# Patient Record
Sex: FEMALE | Race: OTHER | HISPANIC OR LATINO | URBAN - METROPOLITAN AREA
[De-identification: names, ages, dates, MRNs, and addresses within clinical notes are randomized per-mention and may not be internally consistent; named-entity substitution may affect disease eponyms.]

---

## 2021-01-11 ENCOUNTER — INPATIENT (INPATIENT)
Facility: HOSPITAL | Age: 66
LOS: 7 days | Discharge: HOME | DRG: 99 | End: 2021-01-19
Attending: PSYCHIATRY & NEUROLOGY | Admitting: STUDENT IN AN ORGANIZED HEALTH CARE EDUCATION/TRAINING PROGRAM
Payer: MEDICARE

## 2021-01-11 VITALS
DIASTOLIC BLOOD PRESSURE: 59 MMHG | WEIGHT: 138.01 LBS | HEART RATE: 90 BPM | OXYGEN SATURATION: 99 % | SYSTOLIC BLOOD PRESSURE: 99 MMHG | TEMPERATURE: 98 F | HEIGHT: 59 IN | RESPIRATION RATE: 20 BRPM

## 2021-01-11 DIAGNOSIS — M46.90 UNSPECIFIED INFLAMMATORY SPONDYLOPATHY, SITE UNSPECIFIED: ICD-10-CM

## 2021-01-11 LAB
ALBUMIN SERPL ELPH-MCNC: 4.5 G/DL — SIGNIFICANT CHANGE UP (ref 3.3–5)
ALP SERPL-CCNC: 144 U/L — HIGH (ref 40–120)
ALT FLD-CCNC: 19 U/L — SIGNIFICANT CHANGE UP (ref 10–45)
ANION GAP SERPL CALC-SCNC: 15 MMOL/L — SIGNIFICANT CHANGE UP (ref 5–17)
APTT BLD: 36.7 SEC — HIGH (ref 27.5–35.5)
AST SERPL-CCNC: 14 U/L — SIGNIFICANT CHANGE UP (ref 10–40)
BASOPHILS # BLD AUTO: 0.05 K/UL — SIGNIFICANT CHANGE UP (ref 0–0.2)
BASOPHILS NFR BLD AUTO: 0.4 % — SIGNIFICANT CHANGE UP (ref 0–2)
BILIRUB SERPL-MCNC: 0.5 MG/DL — SIGNIFICANT CHANGE UP (ref 0.2–1.2)
BUN SERPL-MCNC: 24 MG/DL — HIGH (ref 7–23)
CALCIUM SERPL-MCNC: 11.3 MG/DL — HIGH (ref 8.4–10.5)
CHLORIDE SERPL-SCNC: 99 MMOL/L — SIGNIFICANT CHANGE UP (ref 96–108)
CO2 SERPL-SCNC: 23 MMOL/L — SIGNIFICANT CHANGE UP (ref 22–31)
CREAT SERPL-MCNC: 0.93 MG/DL — SIGNIFICANT CHANGE UP (ref 0.5–1.3)
EOSINOPHIL # BLD AUTO: 0.17 K/UL — SIGNIFICANT CHANGE UP (ref 0–0.5)
EOSINOPHIL NFR BLD AUTO: 1.2 % — SIGNIFICANT CHANGE UP (ref 0–6)
GLUCOSE SERPL-MCNC: 103 MG/DL — HIGH (ref 70–99)
HCT VFR BLD CALC: 45.4 % — HIGH (ref 34.5–45)
HGB BLD-MCNC: 14.9 G/DL — SIGNIFICANT CHANGE UP (ref 11.5–15.5)
IMM GRANULOCYTES NFR BLD AUTO: 0.4 % — SIGNIFICANT CHANGE UP (ref 0–1.5)
INR BLD: 1.06 RATIO — SIGNIFICANT CHANGE UP (ref 0.88–1.16)
LYMPHOCYTES # BLD AUTO: 27 % — SIGNIFICANT CHANGE UP (ref 13–44)
LYMPHOCYTES # BLD AUTO: 3.76 K/UL — HIGH (ref 1–3.3)
MCHC RBC-ENTMCNC: 30.8 PG — SIGNIFICANT CHANGE UP (ref 27–34)
MCHC RBC-ENTMCNC: 32.8 GM/DL — SIGNIFICANT CHANGE UP (ref 32–36)
MCV RBC AUTO: 94 FL — SIGNIFICANT CHANGE UP (ref 80–100)
MONOCYTES # BLD AUTO: 0.85 K/UL — SIGNIFICANT CHANGE UP (ref 0–0.9)
MONOCYTES NFR BLD AUTO: 6.1 % — SIGNIFICANT CHANGE UP (ref 2–14)
NEUTROPHILS # BLD AUTO: 9.03 K/UL — HIGH (ref 1.8–7.4)
NEUTROPHILS NFR BLD AUTO: 64.9 % — SIGNIFICANT CHANGE UP (ref 43–77)
NRBC # BLD: 0 /100 WBCS — SIGNIFICANT CHANGE UP (ref 0–0)
PLATELET # BLD AUTO: 303 K/UL — SIGNIFICANT CHANGE UP (ref 150–400)
POTASSIUM SERPL-MCNC: 4.4 MMOL/L — SIGNIFICANT CHANGE UP (ref 3.5–5.3)
POTASSIUM SERPL-SCNC: 4.4 MMOL/L — SIGNIFICANT CHANGE UP (ref 3.5–5.3)
PROT SERPL-MCNC: 8.1 G/DL — SIGNIFICANT CHANGE UP (ref 6–8.3)
PROTHROM AB SERPL-ACNC: 12.7 SEC — SIGNIFICANT CHANGE UP (ref 10.6–13.6)
RBC # BLD: 4.83 M/UL — SIGNIFICANT CHANGE UP (ref 3.8–5.2)
RBC # FLD: 13.4 % — SIGNIFICANT CHANGE UP (ref 10.3–14.5)
SODIUM SERPL-SCNC: 137 MMOL/L — SIGNIFICANT CHANGE UP (ref 135–145)
WBC # BLD: 13.92 K/UL — HIGH (ref 3.8–10.5)
WBC # FLD AUTO: 13.92 K/UL — HIGH (ref 3.8–10.5)

## 2021-01-11 PROCEDURE — 72141 MRI NECK SPINE W/O DYE: CPT | Mod: 26

## 2021-01-11 PROCEDURE — 99285 EMERGENCY DEPT VISIT HI MDM: CPT | Mod: GC

## 2021-01-11 PROCEDURE — 72146 MRI CHEST SPINE W/O DYE: CPT | Mod: 26

## 2021-01-11 PROCEDURE — 72148 MRI LUMBAR SPINE W/O DYE: CPT | Mod: 26

## 2021-01-11 PROCEDURE — 72131 CT LUMBAR SPINE W/O DYE: CPT | Mod: 26

## 2021-01-11 RX ORDER — DEXTROSE 50 % IN WATER 50 %
15 SYRINGE (ML) INTRAVENOUS ONCE
Refills: 0 | Status: DISCONTINUED | OUTPATIENT
Start: 2021-01-11 | End: 2021-01-11

## 2021-01-11 RX ORDER — SODIUM CHLORIDE 9 MG/ML
1000 INJECTION, SOLUTION INTRAVENOUS
Refills: 0 | Status: DISCONTINUED | OUTPATIENT
Start: 2021-01-11 | End: 2021-01-11

## 2021-01-11 RX ORDER — INSULIN LISPRO 100/ML
VIAL (ML) SUBCUTANEOUS AT BEDTIME
Refills: 0 | Status: DISCONTINUED | OUTPATIENT
Start: 2021-01-11 | End: 2021-01-11

## 2021-01-11 RX ORDER — DEXTROSE 50 % IN WATER 50 %
25 SYRINGE (ML) INTRAVENOUS ONCE
Refills: 0 | Status: DISCONTINUED | OUTPATIENT
Start: 2021-01-11 | End: 2021-01-11

## 2021-01-11 RX ORDER — GLUCAGON INJECTION, SOLUTION 0.5 MG/.1ML
1 INJECTION, SOLUTION SUBCUTANEOUS ONCE
Refills: 0 | Status: DISCONTINUED | OUTPATIENT
Start: 2021-01-11 | End: 2021-01-11

## 2021-01-11 RX ORDER — INSULIN LISPRO 100/ML
VIAL (ML) SUBCUTANEOUS
Refills: 0 | Status: DISCONTINUED | OUTPATIENT
Start: 2021-01-11 | End: 2021-01-11

## 2021-01-11 RX ORDER — DEXTROSE 50 % IN WATER 50 %
12.5 SYRINGE (ML) INTRAVENOUS ONCE
Refills: 0 | Status: DISCONTINUED | OUTPATIENT
Start: 2021-01-11 | End: 2021-01-11

## 2021-01-11 NOTE — CONSULT NOTE ADULT - SUBJECTIVE AND OBJECTIVE BOX
p ([id number])     HPI:  65 y.o F with PMHx DM, and Depression presents with abd pain radiating to lower back, and difficulty ambulating x2-3 days. States has noted numbness in bilateral lower extremities, right more than the left. Reports urinary incontinence x1 yesterday.  Denies fall, trauma, burning with urination, v/d, fever,  chills, or dizziness. Of note, pt states when she wipes, she does not feel normal and when her clothes touch her groin area, it feels like pins and needles.  (11 Jan 2021 22:24)      Imaging:    Exam: BUE 5/5. RLE HF 4-/5, KE/KF 4/5, DF 4-/5, PF 5/5. LLE 5/5. SILT and proprioception grossly intact b/l, no clonus, no saddle anesthesia, no radic pain.    --Anticoagulation:    =====================  PAST MEDICAL HISTORY   DM (diabetes mellitus)      PAST SURGICAL HISTORY         MEDICATIONS:  Antibiotics:    Neuro:    Other:  dextrose 40% Gel 15 Gram(s) Oral once  dextrose 5%. 1000 milliLiter(s) IV Continuous <Continuous>  dextrose 5%. 1000 milliLiter(s) IV Continuous <Continuous>  dextrose 50% Injectable 25 Gram(s) IV Push once  dextrose 50% Injectable 12.5 Gram(s) IV Push once  dextrose 50% Injectable 25 Gram(s) IV Push once  glucagon  Injectable 1 milliGRAM(s) IntraMuscular once  insulin lispro (ADMELOG) corrective regimen sliding scale   SubCutaneous three times a day before meals  insulin lispro (ADMELOG) corrective regimen sliding scale   SubCutaneous at bedtime      SOCIAL HISTORY:   Occupation:   Marital Status:     FAMILY HISTORY:      ROS: Negative except per HPI    LABS:  PT/INR - ( 11 Jan 2021 18:55 )   PT: 12.7 sec;   INR: 1.06 ratio         PTT - ( 11 Jan 2021 18:55 )  PTT:36.7 sec                        14.9   13.92 )-----------( 303      ( 11 Jan 2021 18:55 )             45.4     01-11    137  |  99  |  24<H>  ----------------------------<  103<H>  4.4   |  23  |  0.93    Ca    11.3<H>      11 Jan 2021 18:55    TPro  8.1  /  Alb  4.5  /  TBili  0.5  /  DBili  x   /  AST  14  /  ALT  19  /  AlkPhos  144<H>  01-11

## 2021-01-11 NOTE — ED PROVIDER NOTE - OBJECTIVE STATEMENT
64 yo female with pmhx DM, depression, presenting with lower back pain and difficulty ambulating for 3 days. States that she has felt her right LE been "weaker", and has had trouble walking, unable to walk. also has had urinary incontinence.     Denies urinary retention, fecal incontinence, falls, trauma.

## 2021-01-11 NOTE — H&P ADULT - ADDITIONAL PE
T(F): 97.7 (11 Jan 2021 18:19), Max: 97.7 (11 Jan 2021 18:19)  HR: 82 (11 Jan 2021 19:06) (82 - 90)  BP: 105/79 (11 Jan 2021 19:06) (99/59 - 105/79)  RR: 21 (11 Jan 2021 19:06) (20 - 21)  SpO2: 99% (11 Jan 2021 19:06) (99% - 99%)

## 2021-01-11 NOTE — ED PROVIDER NOTE - CARE PLAN
Principal Discharge DX:	Spondylitis  Secondary Diagnosis:	Weakness   Principal Discharge DX:	Transverse myelitis  Secondary Diagnosis:	Weakness

## 2021-01-11 NOTE — ED ADULT NURSE NOTE - OBJECTIVE STATEMENT
65y F p/w back pain, LE weakness, difficulty ambulating and incontinence x 1 episode. pt also endorsing that she feels pins and needles in her groin area and endorses "not feeling normal when she wipes." 65y F hx DM2, depression, and arthritis p/w lower back pain, LE weakness, difficulty ambulating x 2-3days and incontinence x 2 episode. pt also endorsing that she feels pins and needles in her groin area and endorses "not feeling normal when she wipes." pt is ambulatory at baseline- pt able to move legs but states they are weaker than her baseline. pt able to bend left knee but has difficulty bending or lifting her right leg and endorses some numbness and tingling.

## 2021-01-11 NOTE — CONSULT NOTE ADULT - ATTENDING COMMENTS
Pt seen today 1/13/2020.  Agree with above resident evaluation and assessment.  Pt currently has about 2/5 movement right leg and 3/5 left.  Good touch sensation.  MRI with T2 signal change in cord at T10.  She also has L5S1 spondylolisthesis and severe stenosis.  Given exam, thoracic transverse myelopathy is suspected.  Await CSF from LP.  Stenosis not likely a contributor to this exam at this time.  Can be followed up as an outpatient in office.  Plan per primary team.

## 2021-01-11 NOTE — ED PROVIDER NOTE - CLINICAL SUMMARY MEDICAL DECISION MAKING FREE TEXT BOX
66 yo female with pmhx DM, depression, presenting with lower back pain and difficulty ambulating for 3 days. concern for nerve compression. will evaluate initially with ct spine, cbc, cmp, urine studies. will reassess

## 2021-01-11 NOTE — ED PROVIDER NOTE - PROGRESS NOTE DETAILS
Geni Elizalde PGY3  ct shows spondylitis in L5-S1 with multilevel degenerative discs. nsgy evaluated, rec MR T-L spine (non-emergent) to eval further, medicine admission for PT and MRI Geni Elizalde PGY3  upon further discussion, nsgy now recommending stat mri to r/o cord compression, paged hospitalist to update. will reassess. nsgy speaking to mri Geni Elizalde PGY3  nsgy reviewed mri, did not see compression, but possible owl eye sign on t12, concerning for infarct vs inflammation, neuro consulted, will admit for evaluation for transverse myelitis

## 2021-01-11 NOTE — ED ADULT NURSE REASSESSMENT NOTE - NS ED NURSE REASSESS COMMENT FT1
per neurosurg pt needs emergent MRI prior to admission. Admission dispo cancelled at this time awaiting MRI, pt aware of plan.

## 2021-01-11 NOTE — ED PROVIDER NOTE - RAPID ASSESSMENT
65 y.o F with PMHx DM, and Depression presents with abd pain radiating to lower back, and difficulty ambulating x2-3 days. States has noted numbness in bilateral lower extremities, right more than the left. Reports urinary incontinence x1 yesterday.  Denies fall, trauma, burning with urination, v/d, fever,  chills, or dizziness.     Scribe Statement: Andrew WILLETT Tiffany, attest that this documentation has been prepared under the direction and in the presence of Sherrell Diaz (PA) 65 y.o F with PMHx DM, and Depression presents with abd pain radiating to lower back, and difficulty ambulating x2-3 days. States has noted numbness in bilateral lower extremities, right more than the left. Reports urinary incontinence x1 yesterday.  Denies fall, trauma, burning with urination, v/d, fever,  chills, or dizziness. Of note, pt states when she wipes, she does not feel normal and when her clothes touch her groin area, it feels like pins and needles.     Scribe Statement: Andrew WILLETT Tiffany, attest that this documentation has been prepared under the direction and in the presence of Sherrell Diaz) 65 y.o F with PMHx DM, and Depression presents with abd pain radiating to lower back, and difficulty ambulating x2-3 days. States has noted numbness in bilateral lower extremities, right more than the left. Reports urinary incontinence x1 yesterday.  Denies fall, trauma, burning with urination, v/d, fever,  chills, or dizziness. Of note, pt states when she wipes, she does not feel normal and when her clothes touch her groin area, it feels like pins and needles.     escalated to mobile charge at time of evaluation to have patient brought to back for more thorough evaluation of neurologic complaints.    Scribe Statement: I, Yesenia Morales, attest that this documentation has been prepared under the direction and in the presence of Sherrell Diaz (PA)    Sherrell Diaz PA-C: The scribe's documentation has been prepared under my direction and personally reviewed by me in its entirety. I confirm that the note above accurately reflects history obtained.   Patient was rapidly assessed via telemedicine encounter; a limited history was obtained. The patient will be seen and further examined and worked up in the main ED and their care will be completed by the main ED team. Receiving team will follow up on labs, analgesia, any clinical imaging, and perform reassessment and disposition of the patient as clinically indicated. All decisions regarding the progression of care will be made at their discretion.

## 2021-01-11 NOTE — ED PROVIDER NOTE - ATTENDING CONTRIBUTION TO CARE
65 year old female with history of DM, depression presented to ED back pain and bilateral LE weakness(R>L). No recent fall or trauma. Also reported urinary incontinence. On exam: 2/5 strength on RLE 3/5 strengh on LLE. No sensation intact. Deanna rectal tone. Plan: CT lumbar spine to evaluate for occult fractures. Neurosurgery consult. Frequent neurocheck. Admit

## 2021-01-11 NOTE — ED ADULT NURSE NOTE - NSIMPLEMENTINTERV_GEN_ALL_ED
Implemented All Fall with Harm Risk Interventions:  [place] to call system. Call bell, personal items and telephone within reach. Instruct patient to call for assistance. Room bathroom lighting operational. Non-slip footwear when patient is off stretcher. Physically safe environment: no spills, clutter or unnecessary equipment. Stretcher in lowest position, wheels locked, appropriate side rails in place. Provide visual cue, wrist band, yellow gown, etc. Monitor gait and stability. Monitor for mental status changes and reorient to person, place, and time. Review medications for side effects contributing to fall risk. Reinforce activity limits and safety measures with patient and family. Provide visual clues: red socks.

## 2021-01-11 NOTE — CONSULT NOTE ADULT - ASSESSMENT
Chacha Staton  65F chronic LBP, present to ED w/ several days intermittent prox BLE numbness and RLE weakness, pain, difficulty walking. Episode of urinary incontinence yesterday, unable to get to bathroom in time. CT shows exaggerated lumbar lordosis w/ gr1 anterolisthesis of L5 on S1 w/ vac disc, b/l foraminal stenosis at 5/1. Exam: BUE 5/5. RLE HF 4-/5, KE/KF 4/5, DF 4-/5, PF 5/5. LLE 5/5. SILT and proprioception grossly intact b/l, no clonus, no saddle anesthesia, no radic pain.  - MR T/L STAT r/o cord compression  - CT head

## 2021-01-11 NOTE — CHART NOTE - NSCHARTNOTEFT_GEN_A_CORE
- MR imaging demonstrates owl eye sign at T10 concerning for ischemia vs inflammatory pathology, rec neurology and/or rheumatology w/u  - Grossly no evidence of cord compression  - No acute neurosurgical intervention  - Rec spinal angio per neurology

## 2021-01-12 ENCOUNTER — TRANSCRIPTION ENCOUNTER (OUTPATIENT)
Age: 66
End: 2021-01-12

## 2021-01-12 DIAGNOSIS — Z95.820 PERIPHERAL VASCULAR ANGIOPLASTY STATUS WITH IMPLANTS AND GRAFTS: Chronic | ICD-10-CM

## 2021-01-12 LAB
A1C WITH ESTIMATED AVERAGE GLUCOSE RESULT: 8.9 % — HIGH (ref 4–5.6)
ALBUMIN SERPL ELPH-MCNC: 3.7 G/DL — SIGNIFICANT CHANGE UP (ref 3.3–5)
ALP SERPL-CCNC: 128 U/L — HIGH (ref 40–120)
ALT FLD-CCNC: 16 U/L — SIGNIFICANT CHANGE UP (ref 10–45)
ANION GAP SERPL CALC-SCNC: 12 MMOL/L — SIGNIFICANT CHANGE UP (ref 5–17)
AST SERPL-CCNC: 13 U/L — SIGNIFICANT CHANGE UP (ref 10–40)
BASOPHILS # BLD AUTO: 0.05 K/UL — SIGNIFICANT CHANGE UP (ref 0–0.2)
BASOPHILS NFR BLD AUTO: 0.5 % — SIGNIFICANT CHANGE UP (ref 0–2)
BILIRUB SERPL-MCNC: 0.6 MG/DL — SIGNIFICANT CHANGE UP (ref 0.2–1.2)
BLD GP AB SCN SERPL QL: NEGATIVE — SIGNIFICANT CHANGE UP
BUN SERPL-MCNC: 26 MG/DL — HIGH (ref 7–23)
CALCIUM SERPL-MCNC: 9.5 MG/DL — SIGNIFICANT CHANGE UP (ref 8.4–10.5)
CHLORIDE SERPL-SCNC: 98 MMOL/L — SIGNIFICANT CHANGE UP (ref 96–108)
CO2 SERPL-SCNC: 26 MMOL/L — SIGNIFICANT CHANGE UP (ref 22–31)
CREAT SERPL-MCNC: 0.97 MG/DL — SIGNIFICANT CHANGE UP (ref 0.5–1.3)
DSDNA AB FLD-ACNC: <0.2 AI — SIGNIFICANT CHANGE UP
ENA SS-A AB FLD IA-ACNC: <0.2 AI — SIGNIFICANT CHANGE UP
EOSINOPHIL # BLD AUTO: 0.27 K/UL — SIGNIFICANT CHANGE UP (ref 0–0.5)
EOSINOPHIL NFR BLD AUTO: 2.9 % — SIGNIFICANT CHANGE UP (ref 0–6)
ESTIMATED AVERAGE GLUCOSE: 209 MG/DL — HIGH (ref 68–114)
FOLATE SERPL-MCNC: >20 NG/ML — SIGNIFICANT CHANGE UP
GLUCOSE BLDC GLUCOMTR-MCNC: 135 MG/DL — HIGH (ref 70–99)
GLUCOSE BLDC GLUCOMTR-MCNC: 181 MG/DL — HIGH (ref 70–99)
GLUCOSE BLDC GLUCOMTR-MCNC: 230 MG/DL — HIGH (ref 70–99)
GLUCOSE BLDC GLUCOMTR-MCNC: 258 MG/DL — HIGH (ref 70–99)
GLUCOSE SERPL-MCNC: 237 MG/DL — HIGH (ref 70–99)
HCT VFR BLD CALC: 41 % — SIGNIFICANT CHANGE UP (ref 34.5–45)
HGB BLD-MCNC: 13.6 G/DL — SIGNIFICANT CHANGE UP (ref 11.5–15.5)
IMM GRANULOCYTES NFR BLD AUTO: 0.2 % — SIGNIFICANT CHANGE UP (ref 0–1.5)
LYMPHOCYTES # BLD AUTO: 2.5 K/UL — SIGNIFICANT CHANGE UP (ref 1–3.3)
LYMPHOCYTES # BLD AUTO: 27.2 % — SIGNIFICANT CHANGE UP (ref 13–44)
MCHC RBC-ENTMCNC: 30.6 PG — SIGNIFICANT CHANGE UP (ref 27–34)
MCHC RBC-ENTMCNC: 33.2 GM/DL — SIGNIFICANT CHANGE UP (ref 32–36)
MCV RBC AUTO: 92.3 FL — SIGNIFICANT CHANGE UP (ref 80–100)
MONOCYTES # BLD AUTO: 0.58 K/UL — SIGNIFICANT CHANGE UP (ref 0–0.9)
MONOCYTES NFR BLD AUTO: 6.3 % — SIGNIFICANT CHANGE UP (ref 2–14)
NEUTROPHILS # BLD AUTO: 5.77 K/UL — SIGNIFICANT CHANGE UP (ref 1.8–7.4)
NEUTROPHILS NFR BLD AUTO: 62.9 % — SIGNIFICANT CHANGE UP (ref 43–77)
NRBC # BLD: 0 /100 WBCS — SIGNIFICANT CHANGE UP (ref 0–0)
PLATELET # BLD AUTO: 262 K/UL — SIGNIFICANT CHANGE UP (ref 150–400)
POTASSIUM SERPL-MCNC: 4.1 MMOL/L — SIGNIFICANT CHANGE UP (ref 3.5–5.3)
POTASSIUM SERPL-SCNC: 4.1 MMOL/L — SIGNIFICANT CHANGE UP (ref 3.5–5.3)
PROT SERPL-MCNC: 7 G/DL — SIGNIFICANT CHANGE UP (ref 6–8.3)
RBC # BLD: 4.44 M/UL — SIGNIFICANT CHANGE UP (ref 3.8–5.2)
RBC # FLD: 13.6 % — SIGNIFICANT CHANGE UP (ref 10.3–14.5)
RH IG SCN BLD-IMP: POSITIVE — SIGNIFICANT CHANGE UP
RHEUMATOID FACT SERPL-ACNC: <10 IU/ML — SIGNIFICANT CHANGE UP (ref 0–13)
SARS-COV-2 RNA SPEC QL NAA+PROBE: SIGNIFICANT CHANGE UP
SODIUM SERPL-SCNC: 136 MMOL/L — SIGNIFICANT CHANGE UP (ref 135–145)
T3 SERPL-MCNC: 110 NG/DL — SIGNIFICANT CHANGE UP (ref 80–200)
T4 AB SER-ACNC: 9.4 UG/DL — SIGNIFICANT CHANGE UP (ref 4.6–12)
TSH SERPL-MCNC: 1.67 UIU/ML — SIGNIFICANT CHANGE UP (ref 0.27–4.2)
VIT B12 SERPL-MCNC: 622 PG/ML — SIGNIFICANT CHANGE UP (ref 232–1245)
WBC # BLD: 9.19 K/UL — SIGNIFICANT CHANGE UP (ref 3.8–10.5)
WBC # FLD AUTO: 9.19 K/UL — SIGNIFICANT CHANGE UP (ref 3.8–10.5)

## 2021-01-12 PROCEDURE — 70450 CT HEAD/BRAIN W/O DYE: CPT | Mod: 26

## 2021-01-12 PROCEDURE — 72157 MRI CHEST SPINE W/O & W/DYE: CPT | Mod: 26

## 2021-01-12 PROCEDURE — 99223 1ST HOSP IP/OBS HIGH 75: CPT | Mod: GC,25

## 2021-01-12 PROCEDURE — 72158 MRI LUMBAR SPINE W/O & W/DYE: CPT | Mod: 26

## 2021-01-12 PROCEDURE — 62270 DX LMBR SPI PNXR: CPT | Mod: GC

## 2021-01-12 RX ORDER — AMLODIPINE BESYLATE 2.5 MG/1
5 TABLET ORAL DAILY
Refills: 0 | Status: DISCONTINUED | OUTPATIENT
Start: 2021-01-12 | End: 2021-01-19

## 2021-01-12 RX ORDER — SODIUM CHLORIDE 9 MG/ML
1000 INJECTION, SOLUTION INTRAVENOUS
Refills: 0 | Status: DISCONTINUED | OUTPATIENT
Start: 2021-01-12 | End: 2021-01-19

## 2021-01-12 RX ORDER — GABAPENTIN 400 MG/1
100 CAPSULE ORAL THREE TIMES A DAY
Refills: 0 | Status: DISCONTINUED | OUTPATIENT
Start: 2021-01-12 | End: 2021-01-19

## 2021-01-12 RX ORDER — ATORVASTATIN CALCIUM 80 MG/1
80 TABLET, FILM COATED ORAL AT BEDTIME
Refills: 0 | Status: DISCONTINUED | OUTPATIENT
Start: 2021-01-12 | End: 2021-01-19

## 2021-01-12 RX ORDER — DEXTROSE 50 % IN WATER 50 %
25 SYRINGE (ML) INTRAVENOUS ONCE
Refills: 0 | Status: DISCONTINUED | OUTPATIENT
Start: 2021-01-12 | End: 2021-01-19

## 2021-01-12 RX ORDER — DEXTROSE 50 % IN WATER 50 %
15 SYRINGE (ML) INTRAVENOUS ONCE
Refills: 0 | Status: DISCONTINUED | OUTPATIENT
Start: 2021-01-12 | End: 2021-01-19

## 2021-01-12 RX ORDER — ENOXAPARIN SODIUM 100 MG/ML
40 INJECTION SUBCUTANEOUS DAILY
Refills: 0 | Status: DISCONTINUED | OUTPATIENT
Start: 2021-01-12 | End: 2021-01-12

## 2021-01-12 RX ORDER — SODIUM CHLORIDE 9 MG/ML
1000 INJECTION INTRAMUSCULAR; INTRAVENOUS; SUBCUTANEOUS ONCE
Refills: 0 | Status: COMPLETED | OUTPATIENT
Start: 2021-01-12 | End: 2021-01-12

## 2021-01-12 RX ORDER — DEXTROSE 50 % IN WATER 50 %
12.5 SYRINGE (ML) INTRAVENOUS ONCE
Refills: 0 | Status: DISCONTINUED | OUTPATIENT
Start: 2021-01-12 | End: 2021-01-19

## 2021-01-12 RX ORDER — DONEPEZIL HYDROCHLORIDE 10 MG/1
10 TABLET, FILM COATED ORAL AT BEDTIME
Refills: 0 | Status: DISCONTINUED | OUTPATIENT
Start: 2021-01-12 | End: 2021-01-19

## 2021-01-12 RX ORDER — GLUCAGON INJECTION, SOLUTION 0.5 MG/.1ML
1 INJECTION, SOLUTION SUBCUTANEOUS ONCE
Refills: 0 | Status: DISCONTINUED | OUTPATIENT
Start: 2021-01-12 | End: 2021-01-19

## 2021-01-12 RX ORDER — INSULIN LISPRO 100/ML
VIAL (ML) SUBCUTANEOUS
Refills: 0 | Status: DISCONTINUED | OUTPATIENT
Start: 2021-01-12 | End: 2021-01-17

## 2021-01-12 RX ORDER — INSULIN LISPRO 100/ML
VIAL (ML) SUBCUTANEOUS AT BEDTIME
Refills: 0 | Status: DISCONTINUED | OUTPATIENT
Start: 2021-01-12 | End: 2021-01-17

## 2021-01-12 RX ADMIN — Medication 2: at 17:36

## 2021-01-12 RX ADMIN — DONEPEZIL HYDROCHLORIDE 10 MILLIGRAM(S): 10 TABLET, FILM COATED ORAL at 21:15

## 2021-01-12 RX ADMIN — ATORVASTATIN CALCIUM 80 MILLIGRAM(S): 80 TABLET, FILM COATED ORAL at 21:15

## 2021-01-12 RX ADMIN — GABAPENTIN 100 MILLIGRAM(S): 400 CAPSULE ORAL at 21:15

## 2021-01-12 RX ADMIN — GABAPENTIN 100 MILLIGRAM(S): 400 CAPSULE ORAL at 04:31

## 2021-01-12 RX ADMIN — SODIUM CHLORIDE 1000 MILLILITER(S): 9 INJECTION INTRAMUSCULAR; INTRAVENOUS; SUBCUTANEOUS at 13:30

## 2021-01-12 RX ADMIN — AMLODIPINE BESYLATE 5 MILLIGRAM(S): 2.5 TABLET ORAL at 11:20

## 2021-01-12 RX ADMIN — GABAPENTIN 100 MILLIGRAM(S): 400 CAPSULE ORAL at 13:32

## 2021-01-12 RX ADMIN — Medication 1: at 21:17

## 2021-01-12 NOTE — PROGRESS NOTE ADULT - ASSESSMENT
65F chronic LBP, present to ED w/ several days intermittent prox BLE numbness and RLE weakness, pain, difficulty walking. Episode of urinary incontinence yesterday, unable to get to bathroom in time. CT shows exaggerated lumbar lordosis w/ gr1 anterolisthesis of L5 on S1 w/ vac disc, b/l foraminal stenosis at 5/1. Exam: BUE 5/5. RLE HF 4-/5, KE/KF 4/5, DF 4-/5, PF 5/5. LLE 5/5. SILT and proprioception grossly intact b/l, no clonus, no saddle anesthesia, no radic pain.   ADM neurology q4h neuro checks  - MR T; demonstrates owl's eye sign/T2 signal change at T10 c/f anterior cord infarct vs inflammatory pathology, pending FR, no cord compression  - MRI wwo contrast and DWI seq pending per neurology  - Please send complete preop labs in anticipation of possible angio pending neurology eval per Dr. Workman  - Keep NPO possible spinal angio pending MRI  - needs T&Sx2, MRSA/MSSA swab (ordered)  - PT/OT  - COVID neg 1/11

## 2021-01-12 NOTE — H&P ADULT - ASSESSMENT
Temi Staton is a 65 year old RH Khmer speaking female with a past medical history of HTN, DM, HLD, peripheral artery stents, memory loss who presents with inability to ambulate and lower extremity weakness.    Impression: Lower extremity weakness RLE>LLE with inability to ambulate and MR concerning for an inflammatory vs demyelinating vs ischemic lesion. Concern for transverse myelitis. Potential for spinal cord infarct.     Plan:  LE weakness and inability to ambulate  [] admit to 4 [facility]  [] MR thoracic and lumbar spine w/ and w/o contrast to examine lesion further with DWI sequence  [] likely will require LP for evaluation of thoracic lesion  [] will hold DVT PPx, aspirin, Plavix until after LP  [] B12, folate, TSH, RENNY, ANCA, Sjogrens Ab, RF, ACE  [] unclear reason for elevated WBC, will repeat and obtain Bcx to rule out infection  [] if infectious etiology ruled out likely will start Solumedrol 1g with Protonix Ppx  [] PT/OT    DM  [] BGM and ISS    HTN  [] continue home amlodipine    PAD  [] will hold aspirin and Plavix until after LP    HLD  [] continue home Lipitor    Case to be discussed and patient seen in AM with neurology attending, Dr. Lucero.  Temi Staton is a 65 year old RH Divehi speaking female with a past medical history of HTN, DM, HLD, peripheral artery stents, memory loss who presents with inability to ambulate and lower extremity weakness.    Impression: Lower extremity weakness RLE>LLE with inability to ambulate and MR concerning for an inflammatory vs demyelinating vs ischemic lesion. Concern for transverse myelitis. Potential for spinal cord infarct.     Plan:  LE weakness and inability to ambulate  [] admit to 4 [facility]  [] MR thoracic and lumbar spine w/ and w/o contrast to examine lesion further with DWI sequence  [] likely will require LP for evaluation of thoracic lesion  [] will hold DVT PPx, aspirin, Plavix until after LP  [] B12, Vit E, copper, folate, TSH, RENNY, ANCA, Sjogrens Ab, RF, ACE  [] unclear reason for elevated WBC, will repeat and obtain Bcx to rule out infection  [] if infectious etiology ruled out likely will start Solumedrol 1g with Protonix Ppx  [] PT/OT    DM  [] BGM and ISS    HTN  [] continue home amlodipine    PAD  [] will hold aspirin and Plavix until after LP    HLD  [] continue home Lipitor    Case to be discussed and patient seen in AM with neurology attending, Dr. Lucero.  Temi Staton is a 65 year old RH Mohawk speaking female with a past medical history of HTN, DM, HLD, peripheral artery stents, memory loss who presents with inability to ambulate and lower extremity weakness.    Impression: Lower extremity weakness RLE>LLE with inability to ambulate and MR concerning for an inflammatory vs demyelinating vs ischemic lesion. Concern for transverse myelitis. Potential for spinal cord infarct.     Plan:  LE weakness and inability to ambulate  [] admit to 4 [facility]  [] MR thoracic and lumbar spine w/ and w/o contrast to examine lesion further with DWI sequence  [] likely will require LP for evaluation of thoracic lesion  [] will hold DVT PPx, aspirin, Plavix until after LP  [] B12, Vit E, copper, folate, TSH, RENNY, ANCA, Sjogrens Ab, RF, ACE  [] unclear reason for elevated WBC, will repeat and obtain Bcx to rule out infection  [] if infectious etiology ruled out likely will start Solumedrol 1g with Protonix Ppx  [] PT/OT  [] neurosurgery recs appreciated    DM  [] BGM and ISS    HTN  [] continue home amlodipine    PAD  [] will hold aspirin and Plavix until after LP    HLD  [] continue home Lipitor    Case to be discussed and patient seen in AM with neurology attending, Dr. Lucero.

## 2021-01-12 NOTE — PHYSICAL THERAPY INITIAL EVALUATION ADULT - ADDITIONAL COMMENTS
Pt lives in an apartment w/ spouse, has steps to enter but also ramp & elevator access. PTA pt reports being independent w/ ADL's & functional mobility & did not utilize an AD for ambulation

## 2021-01-12 NOTE — DISCHARGE NOTE PROVIDER - NSDCMRMEDTOKEN_GEN_ALL_CORE_FT
amLODIPine 5 mg oral tablet: 1 tab(s) orally once a day  aspirin 81 mg oral tablet, chewable: 1 tab(s) orally once a day  donepezil 10 mg oral tablet: 1 tab(s) orally once a day (at bedtime)  gabapentin 100 mg oral capsule: 1 cap(s) orally 3 times a day  glipiZIDE 10 mg oral tablet: 1 tab(s) orally once a day  Lipitor 80 mg oral tablet: 1 tab(s) orally once a day  metFORMIN 1000 mg oral tablet: 1 tab(s) orally 2 times a day  Plavix 75 mg oral tablet: 1 tab(s) orally once a day  Vascepa 1 g oral capsule: 2 cap(s) orally 2 times a day   amLODIPine 5 mg oral tablet: 1 tab(s) orally once a day  aspirin 81 mg oral tablet, chewable: 1 tab(s) orally once a day  donepezil 10 mg oral tablet: 1 tab(s) orally once a day (at bedtime)  gabapentin 100 mg oral capsule: 1 cap(s) orally 3 times a day  glipiZIDE 10 mg oral tablet: 1 tab(s) orally once a day  Lipitor 80 mg oral tablet: 1 tab(s) orally once a day  metFORMIN 1000 mg oral tablet: 1 tab(s) orally 2 times a day  Plavix 75 mg oral tablet: 1 tab(s) orally once a day  senna oral tablet: 2 tab(s) orally once a day (at bedtime)  Vascepa 1 g oral capsule: 2 cap(s) orally 2 times a day

## 2021-01-12 NOTE — OCCUPATIONAL THERAPY INITIAL EVALUATION ADULT - PRECAUTIONS/LIMITATIONS, REHAB EVAL
MR Acute SC Compression (1/11): There is no evidence of spinal cord compression identified.There is evidence of abnormal T2 prolongation seen involving the central spinal cord. This extends from from the bottom of T9 to the top of T11. This could be compatible with a transverse myelitis though the possibility of an infectious process or spinal cord infarct cannot be entirely excluded. CT L Spine (1/11): Spondylitic changes most prominent at the L5-S1 level with spondylolisthesis, spinal stenosis and foraminal narrowing. CTH (1/12) Negative for any acute findings MR Acute SC Compression (1/11): There is no evidence of spinal cord compression identified.There is evidence of abnormal T2 prolongation seen involving the central spinal cord. This extends from from the bottom of T9 to the top of T11. This could be compatible with a transverse myelitis though the possibility of an infectious process or spinal cord infarct cannot be entirely excluded. CT L Spine (1/11): Spondylitic changes most prominent at the L5-S1 level with spondylolisthesis, spinal stenosis and foraminal narrowing. CTH (1/12) Negative for any acute findings/no known precautions/limitations

## 2021-01-12 NOTE — H&P ADULT - NSHPREVIEWOFSYSTEMS_GEN_ALL_CORE
REVIEW OF SYSTEMS	    A 10-system ROS was performed and is negative except for those items noted above and/or in the HPI.

## 2021-01-12 NOTE — DISCHARGE NOTE PROVIDER - CARE PROVIDERS DIRECT ADDRESSES
,DirectAddress_Unknown ,DirectAddress_Unknown,[email or web address]

## 2021-01-12 NOTE — DISCHARGE NOTE PROVIDER - HOSPITAL COURSE
Temi Staton is a 65 year old RH Romanian speaking female with a past medical history of HTN, DM, HLD, peripheral artery stents, memory loss who presents with inability to ambulate and lower extremity weakness. At baseline, the patient states that she is able to ambulate without any assistive devices but does so slowly. Approximately 1 week prior she began to feel a pain in her lower abdomen that wrapped around her stomach and radiated to her back bilaterally at approximately the belly button level. She stated that the pain was uncomfortable but was not associated with any weakness and she was still able to perform her daily activities. Did not have any nausea/vomiting at that time. Pain was present for several days and then 3 days ago she noted that she began to have lower extremity weakness which was worse in her RLE. Over that time, she noted that it was difficult for her to get up and ambulate and she required assistance from her family in order to walk. She then presented to the ED for further evaluation. Patient notes that she feels that she has reduced sensation in her lower extremities. Denies head trauma. Denies any symptoms in her upper extremities, or face. Denies headaches, dizziness, lightheadedness, dysphagia, dysarthria, difficulty expressing herself. Denies any recent illnesses, sick contacts, animal exposure or bites. States that she received a flu vaccine in November and has not had any vaccinations since then. No exposure to COVID-19.     Hospital Course (1/11 - ): Patient received CT head (microvascular disease volume loss) and MR lumbosacral spine concerning for abnormal signal intensity at T9-T11, prompting further spinal imaging and lumbar puncture to rule out other etiologies for abnormal signal intensity. Patient arrived to the floor in stable condition. MR cervical and thoracic spine revealed  ______. CSF studies were _______. NMO and MOG serum studies were sent.           Patient neurologically stable for discharge. Temi Staton is a 65 year old RH Greek speaking female with a past medical history of HTN, DM, HLD, peripheral artery stents, memory loss who presents with inability to ambulate and lower extremity weakness. At baseline, the patient states that she is able to ambulate without any assistive devices but does so slowly. Approximately 1 week prior she began to feel a pain in her lower abdomen that wrapped around her stomach and radiated to her back bilaterally at approximately the belly button level. She stated that the pain was uncomfortable but was not associated with any weakness and she was still able to perform her daily activities. Did not have any nausea/vomiting at that time. Pain was present for several days and then 3 days ago she noted that she began to have lower extremity weakness which was worse in her RLE. Over that time, she noted that it was difficult for her to get up and ambulate and she required assistance from her family in order to walk. She then presented to the ED for further evaluation. Patient notes that she feels that she has reduced sensation in her lower extremities. Denies head trauma. Denies any symptoms in her upper extremities, or face. Denies headaches, dizziness, lightheadedness, dysphagia, dysarthria, difficulty expressing herself. Denies any recent illnesses, sick contacts, animal exposure or bites. States that she received a flu vaccine in November and has not had any vaccinations since then. No exposure to COVID-19.     Hospital Course (1/11 - ): Patient received CT head (microvascular disease volume loss) and MR lumbosacral spine concerning for abnormal signal intensity at T9-T11, prompting further spinal imaging and lumbar puncture to rule out other etiologies for abnormal signal intensity. Patient arrived to the floor in stable condition. MR cervical and thoracic spine revealed  focus of abnormal signal at T10 that does not enhance. CSF studies showed 6 cells, protein of 78, negative PCR, and negative flow cytometry. NMO and MOG serum studies were sent. PT/OT recommended acute rehabilitation. Patient was treated with a three day course of methylprednisolone (1/15-1/17).           Patient neurologically stable for discharge. Temi Staton is a 65 year old RH Armenian speaking female with a past medical history of HTN, DM, HLD, peripheral artery stents, memory loss who presents with inability to ambulate and lower extremity weakness. At baseline, the patient states that she is able to ambulate without any assistive devices but does so slowly. Approximately 1 week prior she began to feel a pain in her lower abdomen that wrapped around her stomach and radiated to her back bilaterally at approximately the belly button level. She stated that the pain was uncomfortable but was not associated with any weakness and she was still able to perform her daily activities. Did not have any nausea/vomiting at that time. Pain was present for several days and then 3 days ago she noted that she began to have lower extremity weakness which was worse in her RLE. Over that time, she noted that it was difficult for her to get up and ambulate and she required assistance from her family in order to walk. She then presented to the ED for further evaluation. Patient notes that she feels that she has reduced sensation in her lower extremities. Denies head trauma. Denies any symptoms in her upper extremities, or face. Denies headaches, dizziness, lightheadedness, dysphagia, dysarthria, difficulty expressing herself. Denies any recent illnesses, sick contacts, animal exposure or bites. States that she received a flu vaccine in November and has not had any vaccinations since then. No exposure to COVID-19.     Hospital Course (1/11 - 1/19): Patient received CT head (microvascular disease volume loss) and MR lumbosacral spine concerning for abnormal signal intensity at T9-T11, prompting further spinal imaging and lumbar puncture to rule out other etiologies for abnormal signal intensity. Patient arrived to the floor in stable condition. MR cervical and thoracic spine revealed  focus of abnormal signal at T10 that does not enhance. CSF studies showed 6 cells, protein of 78, negative PCR, and negative flow cytometry. NMO and MOG serum studies were sent. PT/OT recommended acute rehabilitation. Patient was treated with a three day course of methylprednisolone (1/15-1/17).       Patient neurologically stable for discharge.

## 2021-01-12 NOTE — OCCUPATIONAL THERAPY INITIAL EVALUATION ADULT - ANTICIPATED DISCHARGE DISPOSITION, OT EVAL
Acute Rehab. If pt were to be d/c home, Home OT recommended for home safety evaluation, DME training, ADLs, balance, strength, ROM and endurance. Pt owns RW and cane. Pt would require 3-in-1 commode, and w/c for long distances with Supervision/Assist for ADLs and functional mobility./rehabilitation facility

## 2021-01-12 NOTE — H&P ADULT - NSHPLABSRESULTS_GEN_ALL_CORE
Labs, imaging and EKG tracing personally reviewed
LABORATORY:  CBC                       14.9   13.92 )-----------( 303      ( 11 Jan 2021 18:55 )             45.4     Chem 01-11    137  |  99  |  24<H>  ----------------------------<  103<H>  4.4   |  23  |  0.93    Ca    11.3<H>      11 Jan 2021 18:55    TPro  8.1  /  Alb  4.5  /  TBili  0.5  /  DBili  x   /  AST  14  /  ALT  19  /  AlkPhos  144<H>  01-11    LFTs LIVER FUNCTIONS - ( 11 Jan 2021 18:55 )  Alb: 4.5 g/dL / Pro: 8.1 g/dL / ALK PHOS: 144 U/L / ALT: 19 U/L / AST: 14 U/L / GGT: x           Coagulopathy PT/INR - ( 11 Jan 2021 18:55 )   PT: 12.7 sec;   INR: 1.06 ratio         PTT - ( 11 Jan 2021 18:55 )  PTT:36.7 sec    STUDIES & IMAGING:    Radiology (XR, CT, MR, U/S, TTE/ANETTE):    < from: CT Lumbar Spine No Cont (01.11.21 @ 20:22) >    IMPRESSION:  Spondylitic changes most prominent at the L5-S1 level with spondylolisthesis, spinal stenosis and foraminal narrowing.    < end of copied text >    MR Compression series  No noted acute spinal cord compression.  Noted lesion around T10/T11 concerning for inflammatory/demyelinating lesion.     CT Head  To my eye with no acute infarcts or acute intracranial pathology

## 2021-01-12 NOTE — PROGRESS NOTE ADULT - SUBJECTIVE AND OBJECTIVE BOX
Patient seen and examined at bedside.    --Anticoagulation--    T(C): 36.5 (01-12-21 @ 07:29), Max: 36.5 (01-11-21 @ 18:19)  HR: 69 (01-12-21 @ 07:29) (65 - 90)  BP: 119/70 (01-12-21 @ 07:29) (97/61 - 133/74)  RR: 18 (01-12-21 @ 07:29) (18 - 21)  SpO2: 97% (01-12-21 @ 07:29) (97% - 99%)  Wt(kg): --    Exam:  BUE 5/5. RLE HF 4-/5, KE/KF 4/5, DF 4-/5, PF 5/5. LLE 5/5. SILT and proprioception grossly intact b/l, no clonus, no saddle anesthesia, no radic pain.    Imaging:     MRT: demonstrates owl's eye sign/T2 signal change at T10 c/f anterior cord infarct vs inflammatory pathology, pending FR    Labs:                         14.9   13.92 )-----------( 303      ( 11 Jan 2021 18:55 )             45.4     01-12    136  |  98  |  26<H>  ----------------------------<  237<H>  4.1   |  26  |  0.97    Ca    9.5      12 Jan 2021 06:06    TPro  7.0  /  Alb  3.7  /  TBili  0.6  /  DBili  x   /  AST  13  /  ALT  16  /  AlkPhos  128<H>  01-12    PT/INR - ( 11 Jan 2021 18:55 )   PT: 12.7 sec;   INR: 1.06 ratio         PTT - ( 11 Jan 2021 18:55 )  PTT:36.7 sec

## 2021-01-12 NOTE — H&P ADULT - ATTENDING COMMENTS
neurology: lola [phone]    PATIENT SEEN AND EXAMINED WITH RESIDENTS  IMAGING REVIEWED    differential diagnosis includes malignancy, transverse myelitis, NMO, multiple sclerosis, infectious etiologies, ischemic complications (patient has DM, HLD, HTN, "PERIPHERAL ARTERY STENTS," AND is on ASA + Plavex).    Will proceed with CSF analysis.  If unrevealing, then angiogram.  No objection to corticosteroid administration after CSF obtained.

## 2021-01-12 NOTE — OCCUPATIONAL THERAPY INITIAL EVALUATION ADULT - DIAGNOSIS, OT EVAL
Pt presents with decreased strength, balance, ROM and endurance impacting functional mobility and participation in ADLs

## 2021-01-12 NOTE — DISCHARGE NOTE PROVIDER - NSDCCPCAREPLAN_GEN_ALL_CORE_FT
PRINCIPAL DISCHARGE DIAGNOSIS  Diagnosis: Transverse myelitis  Assessment and Plan of Treatment: Take all medications as prescribed.  PT/OT  Outpatient neurology follow up (Dr. Niki Whitaker, [phone]) within 2 weeks      SECONDARY DISCHARGE DIAGNOSES  Diagnosis: Weakness  Assessment and Plan of Treatment:

## 2021-01-12 NOTE — H&P ADULT - NSHPPHYSICALEXAM_GEN_ALL_CORE
General:  Constitutional: Obese Female, appears stated age, in no apparent distress including pain  Head: Normocephalic & atraumatic.  Extremities: No cyanosis, clubbing, or edema.    Neurological (>12):  MS: Awake, alert, oriented to person, place, situation, time. Normal affect. Follows all commands including crossed commands.     Language: Speech is clear, accented, fluent with good repetition & comprehension (able to name objects)    CNs: PERRLA (R = 3mm, L = 3mm). VF intact in all 4 quadrants. EOMI no nystagmus, no diplopia. V1-3 intact to LT, well developed masseter muscles b/l. No facial asymmetry b/l, full eye closure strength b/l. Symmetric palate elevation in midline. Shoulder shrug intact b/l. Tongue midline, normal movements, no atrophy.    Motor: Normal muscle bulk & tone. No noticeable tremor or seizure. No pronator drift.              Deltoid	Biceps	Triceps	   R	5	5	5	5		  L	5	5	5	5    	H-Flex	H-Ext	H-ABd	H-ADd	K-Flex	K-Ext	D-Flex	P-Flex  R	2	2	3	3	4-	4-	4	4-	   L	3	3	4	4	4	4	4+	4+	     Sensation: Reduced sensation to light touch and pinprick beginning at T12/L1 level. Reduced sensation to LT/pinprick/vibratory sense in LE as compared with UE. More sensation on LLE>RLE. Intact proprioception throughout.     Reflexes:              Biceps(C5)       BR(C6)     Triceps(C7)               Patellar(L4)    Achilles(S1)    Plantar Resp  R	2	          2	             2		        2		    1		Mute  L	2	          2	             2		        2		    1		Mute     Coordination: . No dysmetria to FTN    Gait: Unable to stand without 2 person assist. Unable to take steps.

## 2021-01-12 NOTE — DISCHARGE NOTE PROVIDER - PROVIDER TOKENS
PROVIDER:[TOKEN:[[id number]],FOLLOWUP:[2 weeks]] PROVIDER:[TOKEN:[[id number]],FOLLOWUP:[2 weeks]],PROVIDER:[TOKEN:[[id number]],FOLLOWUP:[2 weeks]]

## 2021-01-12 NOTE — PHYSICAL THERAPY INITIAL EVALUATION ADULT - PERTINENT HX OF CURRENT PROBLEM, REHAB EVAL
65 y.o. F HTN, DM, HLD, peripheral artery stents, memory loss who presents w/ inability to ambulate & LE weakness. At baseline, pt is able to ambulate w/out any assistive devices but does so slowly. Approximately 1 week prior she began to feel a pain in her lower abdomen & radiated to back bilaterally. Then she began to have LE weakness which was worse in her RLE. Pt notes that she feels that she has reduced sensation in her lower extremities.

## 2021-01-12 NOTE — H&P ADULT - NSICDXPASTMEDICALHX_GEN_ALL_CORE_FT
PAST MEDICAL HISTORY:  DM (diabetes mellitus)     
PAST MEDICAL HISTORY:  DM (diabetes mellitus)     HTN (hypertension)     Memory loss     PAD (peripheral artery disease)

## 2021-01-12 NOTE — H&P ADULT - HISTORY OF PRESENT ILLNESS
HPI:  Temi Staton is a 65 year old RH Swazi speaking female with a past medical history of HTN, DM, HLD, peripheral artery stents, memory loss who presents with inability to ambulate and lower extremity weakness. At baseline, the patient states that she is able to ambulate without any assistive devices but does so slowly. Approximately 1 week prior she began to feel a pain in her lower abdomen that wrapped around her stomach and radiated to her back bilaterally at approximately the belly button level. She stated that the pain was uncomfortable but was not associated with any weakness and she was still able to perform her daily activities. Did not have any nausea/vomiting at that time. Pain was present for several days and then 3 days ago she noted that she began to have lower extremity weakness which was worse in her RLE. Over that time, she noted that it was difficult for her to get up and ambulate and she required assistance from her family in order to walk. She then presented to the ED for further evaluation. Patient notes that she feels that she has reduced sensation in her lower extremities. Denies head trauma. Denies any symptoms in her upper extremities, or face. Denies headaches, dizziness, lightheadedness, dysphagia, dysarthria, difficulty expressing herself. Denies any recent illnesses, sick contacts, animal exposure or bites. States that she received a flu vaccine in November and has not had any vaccinations since then. No exposure to COVID-19.     ALLERGIES/INTOLERANCES:  Allergies  No Known Allergies    Intolerances    VITALS & EXAMINATION:  Vital Signs Last 24 Hrs  T(C): 36.4 (12 Jan 2021 02:10), Max: 36.5 (11 Jan 2021 18:19)  T(F): 97.6 (12 Jan 2021 02:10), Max: 97.7 (11 Jan 2021 18:19)  HR: 82 (12 Jan 2021 02:10) (65 - 90)  BP: 103/61 (12 Jan 2021 02:10) (99/59 - 133/74)  BP(mean): --  RR: 20 (12 Jan 2021 02:10) (18 - 21)  SpO2: 98% (12 Jan 2021 02:10) (98% - 99%)      
65 y.o F with PMHx DM, and Depression presents with abd pain radiating to lower back, and difficulty ambulating x2-3 days. States has noted numbness in bilateral lower extremities, right more than the left. Reports urinary incontinence x1 yesterday.  Denies fall, trauma, burning with urination, v/d, fever,  chills, or dizziness. Of note, pt states when she wipes, she does not feel normal and when her clothes touch her groin area, it feels like pins and needles.

## 2021-01-12 NOTE — DISCHARGE NOTE PROVIDER - CARE PROVIDER_API CALL
Niki Whitaker)  Neurology  [address] [place] [id number]  Phone: [phone]  Fax: [phone]  Follow Up Time: 2 weeks   Niki Whitaker)  Neurology  [address] [place] [address]  Phone: [phone]  Fax: [phone]  Follow Up Time: 2 weeks    Wil Jones)  Neurology  [address] [address]  Phone: [phone]  Fax: [phone]  Follow Up Time: 2 weeks

## 2021-01-12 NOTE — OCCUPATIONAL THERAPY INITIAL EVALUATION ADULT - PERTINENT HX OF CURRENT PROBLEM, REHAB EVAL
65 year old RH Tristanian speaking female with a past medical history of HTN, DM, HLD, peripheral artery stents, memory loss who presents with inability to ambulate with BLE sensation impairments and lower extremity weakness (RLE>LLE)

## 2021-01-12 NOTE — OCCUPATIONAL THERAPY INITIAL EVALUATION ADULT - LIVES WITH, PROFILE
As per pt, lives with spouse in elevator access APT. Pt was Independent with all mobility and ADLs PTA. Pt owns cane from elderly mother. Pt has tub/shower combination./spouse

## 2021-01-13 ENCOUNTER — RESULT REVIEW (OUTPATIENT)
Age: 66
End: 2021-01-13

## 2021-01-13 LAB
ACE SERPL-CCNC: 27 U/L — SIGNIFICANT CHANGE UP (ref 14–82)
ANA TITR SER: NEGATIVE — SIGNIFICANT CHANGE UP
APPEARANCE CSF: CLEAR — SIGNIFICANT CHANGE UP
COLOR CSF: SIGNIFICANT CHANGE UP
EOSINOPHIL # CSF: 1 % — SIGNIFICANT CHANGE UP
GLUCOSE BLDC GLUCOMTR-MCNC: 191 MG/DL — HIGH (ref 70–99)
GLUCOSE BLDC GLUCOMTR-MCNC: 201 MG/DL — HIGH (ref 70–99)
GLUCOSE BLDC GLUCOMTR-MCNC: 214 MG/DL — HIGH (ref 70–99)
GLUCOSE BLDC GLUCOMTR-MCNC: 239 MG/DL — HIGH (ref 70–99)
GLUCOSE CSF-MCNC: 122 MG/DL — HIGH (ref 40–70)
HCV AB S/CO SERPL IA: 0.05 S/CO — SIGNIFICANT CHANGE UP (ref 0–0.99)
HCV AB SERPL-IMP: SIGNIFICANT CHANGE UP
LYMPHOCYTES # CSF: 28 % — LOW (ref 40–80)
MONOS+MACROS NFR CSF: 14 % — LOW (ref 15–45)
NEUTROPHILS # CSF: 57 % — HIGH (ref 0–6)
NRBC NFR CSF: 6 /UL — HIGH (ref 0–5)
PROT CSF-MCNC: 78 MG/DL — HIGH (ref 15–45)
RBC # CSF: 280 /UL — HIGH (ref 0–0)
SARS-COV-2 IGG SERPL QL IA: NEGATIVE — SIGNIFICANT CHANGE UP
SARS-COV-2 IGM SERPL IA-ACNC: 0.12 INDEX — SIGNIFICANT CHANGE UP
TUBE TYPE: SIGNIFICANT CHANGE UP

## 2021-01-13 PROCEDURE — 88189 FLOWCYTOMETRY/READ 16 & >: CPT

## 2021-01-13 PROCEDURE — 88108 CYTOPATH CONCENTRATE TECH: CPT | Mod: 26

## 2021-01-13 PROCEDURE — 99223 1ST HOSP IP/OBS HIGH 75: CPT

## 2021-01-13 PROCEDURE — 88108 CYTOPATH CONCENTRATE TECH: CPT | Mod: 26,59

## 2021-01-13 PROCEDURE — 99222 1ST HOSP IP/OBS MODERATE 55: CPT

## 2021-01-13 PROCEDURE — 62328 DX LMBR SPI PNXR W/FLUOR/CT: CPT

## 2021-01-13 PROCEDURE — 99233 SBSQ HOSP IP/OBS HIGH 50: CPT | Mod: GC

## 2021-01-13 RX ORDER — CLOPIDOGREL BISULFATE 75 MG/1
75 TABLET, FILM COATED ORAL DAILY
Refills: 0 | Status: DISCONTINUED | OUTPATIENT
Start: 2021-01-13 | End: 2021-01-13

## 2021-01-13 RX ORDER — ENOXAPARIN SODIUM 100 MG/ML
40 INJECTION SUBCUTANEOUS AT BEDTIME
Refills: 0 | Status: DISCONTINUED | OUTPATIENT
Start: 2021-01-13 | End: 2021-01-19

## 2021-01-13 RX ORDER — ASPIRIN/CALCIUM CARB/MAGNESIUM 324 MG
81 TABLET ORAL DAILY
Refills: 0 | Status: DISCONTINUED | OUTPATIENT
Start: 2021-01-14 | End: 2021-01-19

## 2021-01-13 RX ORDER — CLOPIDOGREL BISULFATE 75 MG/1
75 TABLET, FILM COATED ORAL DAILY
Refills: 0 | Status: DISCONTINUED | OUTPATIENT
Start: 2021-01-14 | End: 2021-01-19

## 2021-01-13 RX ORDER — ASPIRIN/CALCIUM CARB/MAGNESIUM 324 MG
81 TABLET ORAL DAILY
Refills: 0 | Status: DISCONTINUED | OUTPATIENT
Start: 2021-01-13 | End: 2021-01-13

## 2021-01-13 RX ADMIN — Medication 2: at 12:43

## 2021-01-13 RX ADMIN — ENOXAPARIN SODIUM 40 MILLIGRAM(S): 100 INJECTION SUBCUTANEOUS at 21:05

## 2021-01-13 RX ADMIN — GABAPENTIN 100 MILLIGRAM(S): 400 CAPSULE ORAL at 21:03

## 2021-01-13 RX ADMIN — DONEPEZIL HYDROCHLORIDE 10 MILLIGRAM(S): 10 TABLET, FILM COATED ORAL at 21:03

## 2021-01-13 RX ADMIN — GABAPENTIN 100 MILLIGRAM(S): 400 CAPSULE ORAL at 05:09

## 2021-01-13 RX ADMIN — AMLODIPINE BESYLATE 5 MILLIGRAM(S): 2.5 TABLET ORAL at 05:09

## 2021-01-13 RX ADMIN — ATORVASTATIN CALCIUM 80 MILLIGRAM(S): 80 TABLET, FILM COATED ORAL at 21:03

## 2021-01-13 RX ADMIN — Medication 1: at 08:51

## 2021-01-13 RX ADMIN — Medication 2: at 17:53

## 2021-01-13 RX ADMIN — GABAPENTIN 100 MILLIGRAM(S): 400 CAPSULE ORAL at 12:44

## 2021-01-13 NOTE — PROGRESS NOTE ADULT - ASSESSMENT
Temi Staton is a 65 year old RH Italian speaking female with a past medical history of HTN, DM, HLD, peripheral artery stents, memory loss who presents with inability to ambulate and lower extremity weakness.    Impression: Lower extremity weakness RLE>LLE with inability to ambulate and MR concerning for an inflammatory vs demyelinating vs ischemic lesion. Concern for transverse myelitis at this time.     Plan:  LE weakness and inability to ambulate  [] IR-guided LP  [] will hold DVT PPx, aspirin, Plavix until after LP  [] f/u B12, Vit E, copper, folate, TSH, RENNY, ANCA, Sjogrens Ab, RF, ACE  [] if infectious etiology ruled out likely will start Solumedrol 1g with Protonix Ppx  [] PT/OT  [] neurosurgery recs appreciated    DM  [] BGM and ISS    HTN  [] continue home amlodipine    PAD  [] will hold aspirin and Plavix until after LP    HLD  [] continue home Lipitor

## 2021-01-13 NOTE — CONSULT NOTE ADULT - SUBJECTIVE AND OBJECTIVE BOX
Patient is a 65y old  Female who presents with a chief complaint of inability to ambulate (13 Jan 2021 07:27)    Admission HPI:  Temi Staton is a 65 year old RH [place]ish speaking female with a past medical history of HTN, DM, HLD, peripheral artery stents, memory loss who presents with inability to ambulate and lower extremity weakness. At baseline, the patient states that she is able to ambulate without any assistive devices but does so slowly. Approximately 1 week prior she began to feel a pain in her lower abdomen that wrapped around her stomach and radiated to her back bilaterally at approximately the belly button level. She stated that the pain was uncomfortable but was not associated with any weakness and she was still able to perform her daily activities. Did not have any nausea/vomiting at that time. Pain was present for several days and then 3 days ago she noted that she began to have lower extremity weakness which was worse in her RLE. Over that time, she noted that it was difficult for her to get up and ambulate and she required assistance from her family in order to walk. She then presented to the ED for further evaluation. Patient notes that she feels that she has reduced sensation in her lower extremities. Denies head trauma. Denies any symptoms in her upper extremities, or face. Denies headaches, dizziness, lightheadedness, dysphagia, dysarthria, difficulty expressing herself. Denies any recent illnesses, sick contacts, animal exposure or bites. States that she received a flu vaccine in November and has not had any vaccinations since then. No exposure to COVID-19.     Interval History:  Patient had imaging:  CT Head No Cont on 1/12 noted "Volume loss with microvascular disease, age-indeterminate lacunar infarcts, no acute hemorrhage or midline shift. If symptoms persist consider follow-up head CT or MR if no contraindications."    CT Lumbar Spine No Cont on 1/11/12 notoed "Spondylitic changes most prominent at the L5-S1 level with spondylolisthesis, spinal stenosis and foraminal narrowing."    MRI of Lumbar Spine w/wo IV Cont  on 1/12/21 noted " Focus of abnormal signal within the cord at T10 does not enhance with contrast. Differential diagnosis includes infectious, inflammatory and demyelinating processes."    Patient with persistent LE weakness. Being followed by neurosx and neurology and w/u in progress- with possible transverse myelitis.    REVIEW OF SYSTEMS: No chest pain, shortness of breath, nausea, vomiting or diarhea; other ROS neg     PAST MEDICAL & SURGICAL HISTORY  PAD (peripheral artery disease)  Memory loss  HTN (hypertension)  DM (diabetes mellitus)  S/P peripheral artery angioplasty with stent placement    FUNCTIONAL HISTORY:   Lives w spouse in an apartment.  PTA Independent    CURRENT FUNCTIONAL STATUS:  Mod A transfers, min A gait.     FAMILY HISTORY   No pertinent family history in first degree relatives    MEDICATIONS   amLODIPine   Tablet 5 milliGRAM(s) Oral daily  atorvastatin 80 milliGRAM(s) Oral at bedtime  dextrose 40% Gel 15 Gram(s) Oral once  dextrose 5%. 1000 milliLiter(s) IV Continuous <Continuous>  dextrose 5%. 1000 milliLiter(s) IV Continuous <Continuous>  dextrose 50% Injectable 25 Gram(s) IV Push once  dextrose 50% Injectable 12.5 Gram(s) IV Push once  dextrose 50% Injectable 25 Gram(s) IV Push once  donepezil 10 milliGRAM(s) Oral at bedtime  gabapentin 100 milliGRAM(s) Oral three times a day  glucagon  Injectable 1 milliGRAM(s) IntraMuscular once  insulin lispro (ADMELOG) corrective regimen sliding scale   SubCutaneous three times a day before meals  insulin lispro (ADMELOG) corrective regimen sliding scale   SubCutaneous at bedtime    ALLERGIES  No Known Allergies    VITALS  T(C): 36.4 (01-13-21 @ 04:08), Max: 36.6 (01-12-21 @ 23:38)  HR: 72 (01-13-21 @ 04:08) (71 - 78)  BP: 113/59 (01-13-21 @ 04:08) (108/70 - 118/75)  RR: 18 (01-13-21 @ 04:08) (18 - 18)  SpO2: 97% (01-13-21 @ 04:08) (96% - 97%)  Wt(kg): --    PHYSICAL EXAM  Constitutional - NAD, Comfortable  HEENT - NCAT, EOMI  Neck - Supple, No limited ROM  Chest - CTA bilaterally, No wheeze, No rhonchi, No crackles  Cardiovascular - RRR, S1S2, No murmurs  Abdomen - BS+, Soft, NTND  Extremities - No C/C/E, No calf tenderness   Neurologic Exam -                    Cognitive - Awake, Alert, AAO to self, place, date, year, situation     Communication - Fluent, No dysarthria, no aphasia     Cranial Nerves - CN 2-12 intact     Motor - No focal deficits      Sensory - Intact to LT     Reflexes - DTR Intact, No primitive reflexive  Psychiatric - Mood stable, Affect WNL    RECENT LABS/IMAGING  CBC Full  -  ( 12 Jan 2021 06:06 )  WBC Count : 9.19 K/uL  RBC Count : 4.44 M/uL  Hemoglobin : 13.6 g/dL  Hematocrit : 41.0 %  Platelet Count - Automated : 262 K/uL  Mean Cell Volume : 92.3 fl  Mean Cell Hemoglobin : 30.6 pg  Mean Cell Hemoglobin Concentration : 33.2 gm/dL  Auto Neutrophil # : 5.77 K/uL  Auto Lymphocyte # : 2.50 K/uL  Auto Monocyte # : 0.58 K/uL  Auto Eosinophil # : 0.27 K/uL  Auto Basophil # : 0.05 K/uL  Auto Neutrophil % : 62.9 %  Auto Lymphocyte % : 27.2 %  Auto Monocyte % : 6.3 %  Auto Eosinophil % : 2.9 %  Auto Basophil % : 0.5 %    01-12    136  |  98  |  26<H>  ----------------------------<  237<H>  4.1   |  26  |  0.97    Ca    9.5      12 Jan 2021 06:06    TPro  7.0  /  Alb  3.7  /  TBili  0.6  /  DBili  x   /  AST  13  /  ALT  16  /  AlkPhos  128<H>  01-12    Impression:  64 yo with functional deficits secondary to diagnosis of lower extremity weakness- w/u in progress.    Plan:  Continue w/u per neurology and neurosx  PT- ROM, Bed Mob, Transfers, Amb w AD and bracing as needed  OT- ADLs, bracing  Prec- Falls, Cardiac  DVT Prophylaxis- SCDs  Skin- Turn q2 h  Dispo-  Patient is a 65y old  Female who presents with a chief complaint of inability to ambulate (13 Jan 2021 07:27)    Admission HPI:  Temi Staton is a 65 year old RH Lao speaking female with a past medical history of HTN, DM, HLD, peripheral artery stents, memory loss who presents with inability to ambulate and lower extremity weakness. At baseline, the patient states that she is able to ambulate without any assistive devices but does so slowly. Approximately 1 week prior she began to feel a pain in her lower abdomen that wrapped around her stomach and radiated to her back bilaterally at approximately the belly button level. She stated that the pain was uncomfortable but was not associated with any weakness and she was still able to perform her daily activities. Did not have any nausea/vomiting at that time. Pain was present for several days and then 3 days ago she noted that she began to have lower extremity weakness which was worse in her RLE. Over that time, she noted that it was difficult for her to get up and ambulate and she required assistance from her family in order to walk. She then presented to the ED for further evaluation. Patient notes that she feels that she has reduced sensation in her lower extremities. Denies head trauma. Denies any symptoms in her upper extremities, or face. Denies headaches, dizziness, lightheadedness, dysphagia, dysarthria, difficulty expressing herself. Denies any recent illnesses, sick contacts, animal exposure or bites. States that she received a flu vaccine in November and has not had any vaccinations since then. No exposure to COVID-19.     Interval History:  Patient had imaging:  CT Head No Cont on 1/12 noted "Volume loss with microvascular disease, age-indeterminate lacunar infarcts, no acute hemorrhage or midline shift. If symptoms persist consider follow-up head CT or MR if no contraindications."    CT Lumbar Spine No Cont on 1/11/12 notoed "Spondylitic changes most prominent at the L5-S1 level with spondylolisthesis, spinal stenosis and foraminal narrowing."    MRI of Lumbar Spine w/wo IV Cont  on 1/12/21 noted " Focus of abnormal signal within the cord at T10 does not enhance with contrast. Differential diagnosis includes infectious, inflammatory and demyelinating processes."    Patient with persistent LE weakness. Being followed by neurosx and neurology and w/u in progress- with possible transverse myelitis.    REVIEW OF SYSTEMS: LE weakness (improving), + poor balance, + bladder urgency, No chest pain, shortness of breath, nausea, vomiting or diarhea; other ROS neg     PAST MEDICAL & SURGICAL HISTORY  PAD (peripheral artery disease)  Memory loss  HTN (hypertension)  DM (diabetes mellitus)  S/P peripheral artery angioplasty with stent placement    FUNCTIONAL HISTORY:   Lives w spouse in an apartment.  PTA Independent    CURRENT FUNCTIONAL STATUS:  Mod A transfers, min A gait.     FAMILY HISTORY   No pertinent family history in first degree relatives    MEDICATIONS   amLODIPine   Tablet 5 milliGRAM(s) Oral daily  atorvastatin 80 milliGRAM(s) Oral at bedtime  dextrose 40% Gel 15 Gram(s) Oral once  dextrose 5%. 1000 milliLiter(s) IV Continuous <Continuous>  dextrose 5%. 1000 milliLiter(s) IV Continuous <Continuous>  dextrose 50% Injectable 25 Gram(s) IV Push once  dextrose 50% Injectable 12.5 Gram(s) IV Push once  dextrose 50% Injectable 25 Gram(s) IV Push once  donepezil 10 milliGRAM(s) Oral at bedtime  gabapentin 100 milliGRAM(s) Oral three times a day  glucagon  Injectable 1 milliGRAM(s) IntraMuscular once  insulin lispro (ADMELOG) corrective regimen sliding scale   SubCutaneous three times a day before meals  insulin lispro (ADMELOG) corrective regimen sliding scale   SubCutaneous at bedtime    ALLERGIES  No Known Allergies    VITALS  T(C): 36.4 (01-13-21 @ 04:08), Max: 36.6 (01-12-21 @ 23:38)  HR: 72 (01-13-21 @ 04:08) (71 - 78)  BP: 113/59 (01-13-21 @ 04:08) (108/70 - 118/75)  RR: 18 (01-13-21 @ 04:08) (18 - 18)  SpO2: 97% (01-13-21 @ 04:08) (96% - 97%)  Wt(kg): --    PHYSICAL EXAM  Constitutional - NAD, Comfortable  HEENT - NCAT, EOMI  Neck - Supple, No limited ROM  Chest - CTA bilaterally, No wheeze, No rhonchi, No crackles  Cardiovascular - RRR, S1S2, No murmurs  Abdomen - BS+, Soft, NTND  Extremities - No C/C/E, No calf tenderness   Neurologic Exam -                   AAO x 3    Speech clear/fluent/intelligible    Motor 3+/5 RLE, 4+/5 LLE, 5/5 bl UE    Sens intact    No clonus  Psychiatric - Mood stable, Affect WNL    RECENT LABS/IMAGING  CBC Full  -  ( 12 Jan 2021 06:06 )  WBC Count : 9.19 K/uL  RBC Count : 4.44 M/uL  Hemoglobin : 13.6 g/dL  Hematocrit : 41.0 %  Platelet Count - Automated : 262 K/uL  Mean Cell Volume : 92.3 fl  Mean Cell Hemoglobin : 30.6 pg  Mean Cell Hemoglobin Concentration : 33.2 gm/dL  Auto Neutrophil # : 5.77 K/uL  Auto Lymphocyte # : 2.50 K/uL  Auto Monocyte # : 0.58 K/uL  Auto Eosinophil # : 0.27 K/uL  Auto Basophil # : 0.05 K/uL  Auto Neutrophil % : 62.9 %  Auto Lymphocyte % : 27.2 %  Auto Monocyte % : 6.3 %  Auto Eosinophil % : 2.9 %  Auto Basophil % : 0.5 %    01-12    136  |  98  |  26<H>  ----------------------------<  237<H>  4.1   |  26  |  0.97    Ca    9.5      12 Jan 2021 06:06    TPro  7.0  /  Alb  3.7  /  TBili  0.6  /  DBili  x   /  AST  13  /  ALT  16  /  AlkPhos  128<H>  01-12    Impression:  64 yo with functional deficits secondary to diagnosis of lower extremity weakness- w/u in progress.    Plan:  Continue w/u per neurology and neurosx  PT- ROM, Bed Mob, Transfers, Amb w AD and bracing as needed  OT- ADLs, bracing  Prec- Falls, Cardiac  Bladder- check TOV  DVT Prophylaxis- SCDs  Skin- Turn q2 h  Dispo- Acute Rehab- can tolerate 3h/d of therapies and requires daily physician visits

## 2021-01-13 NOTE — PROGRESS NOTE ADULT - SUBJECTIVE AND OBJECTIVE BOX
Neurology Follow up note  Patient is a 65y old  Female who presents with a chief complaint of inability to ambulate (12 Jan 2021 09:07)    Subjective:  Interval History - No events overnight    Objective:   Vital Signs Last 24 Hrs  T(C): 36.4 (13 Jan 2021 04:08), Max: 36.6 (12 Jan 2021 23:38)  T(F): 97.6 (13 Jan 2021 04:08), Max: 97.8 (12 Jan 2021 23:38)  HR: 72 (13 Jan 2021 04:08) (71 - 78)  BP: 113/59 (13 Jan 2021 04:08) (108/70 - 118/75)  BP(mean): --  RR: 18 (13 Jan 2021 04:08) (18 - 18)  SpO2: 97% (13 Jan 2021 04:08) (96% - 97%)    MEDICATIONS  (STANDING):  amLODIPine   Tablet 5 milliGRAM(s) Oral daily  atorvastatin 80 milliGRAM(s) Oral at bedtime  dextrose 40% Gel 15 Gram(s) Oral once  dextrose 5%. 1000 milliLiter(s) (50 mL/Hr) IV Continuous <Continuous>  dextrose 5%. 1000 milliLiter(s) (100 mL/Hr) IV Continuous <Continuous>  dextrose 50% Injectable 25 Gram(s) IV Push once  dextrose 50% Injectable 12.5 Gram(s) IV Push once  dextrose 50% Injectable 25 Gram(s) IV Push once  donepezil 10 milliGRAM(s) Oral at bedtime  gabapentin 100 milliGRAM(s) Oral three times a day  glucagon  Injectable 1 milliGRAM(s) IntraMuscular once  insulin lispro (ADMELOG) corrective regimen sliding scale   SubCutaneous three times a day before meals  insulin lispro (ADMELOG) corrective regimen sliding scale   SubCutaneous at bedtime    MEDICATIONS  (PRN):    General:  Constitutional: Obese Female, appears stated age, in no apparent distress including pain  Head: Normocephalic & atraumatic.  Extremities: No cyanosis, clubbing, or edema.    Neurological (>12):  MS: Awake, alert, oriented to person, place, situation, time. Normal affect. Follows all commands including crossed commands.     Language: Speech is clear, accented, fluent with good repetition & comprehension (able to name objects)    CNs: PERRLA (R = 3mm, L = 3mm). VF intact in all 4 quadrants. EOMI no nystagmus, no diplopia. V1-3 intact to LT, well developed masseter muscles b/l. No facial asymmetry b/l, full eye closure strength b/l. Symmetric palate elevation in midline. Shoulder shrug intact b/l. Tongue midline, normal movements, no atrophy.    Motor: Normal muscle bulk & tone. No noticeable tremor or seizure. No pronator drift.              Deltoid	Biceps	Triceps	   R	5	5	5	5		  L	5	5	5	5    	H-Flex	H-Ext	H-ABd	H-ADd	K-Flex	K-Ext	D-Flex	P-Flex  R	3	3	3	3	4-	4-	4	4-	   L	4	4	4	4	4	4	4+	4+	     Sensation: Reduced sensation to light touch and pinprick beginning at T12/L1 level. Reduced sensation to LT/pinprick/vibratory sense in LE as compared with UE. More sensation on LLE>RLE. Intact proprioception throughout.     Reflexes:              Biceps(C5)       BR(C6)     Triceps(C7)               Patellar(L4)    Achilles(S1)    Plantar Resp  R	2	          2	             2		        2		    1		Mute  L	2	          2	             2		        2		    1		Mute     Coordination: . No dysmetria to FTN    Gait: Unable to stand without 2 person assist. Unable to take steps.        Other:    01-12    136  |  98  |  26<H>  ----------------------------<  237<H>  4.1   |  26  |  0.97    Ca    9.5      12 Jan 2021 06:06    TPro  7.0  /  Alb  3.7  /  TBili  0.6  /  DBili  x   /  AST  13  /  ALT  16  /  AlkPhos  128<H>  01-12 01-12    136  |  98  |  26<H>  ----------------------------<  237<H>  4.1   |  26  |  0.97    Ca    9.5      12 Jan 2021 06:06    TPro  7.0  /  Alb  3.7  /  TBili  0.6  /  DBili  x   /  AST  13  /  ALT  16  /  AlkPhos  128<H>  01-12    LIVER FUNCTIONS - ( 12 Jan 2021 06:06 )  Alb: 3.7 g/dL / Pro: 7.0 g/dL / ALK PHOS: 128 U/L / ALT: 16 U/L / AST: 13 U/L / GGT: x                                 13.6   9.19  )-----------( 262      ( 12 Jan 2021 06:06 )             41.0     Radiology  < from: MR Lumbar Spine w/wo IV Cont (01.12.21 @ 12:39) >  IMPRESSION: Focus of abnormal signal within the cord at T10 does not enhance with contrast. Differential diagnosis includes infectious, inflammatory and demyelinating processes.  < end of copied text >                  Neurology Follow up note  Patient is a 65y old  Female who presents with a chief complaint of inability to ambulate (12 Jan 2021 09:07)    Subjective:  Interval History - No events overnight    Objective:   Vital Signs Last 24 Hrs  T(C): 36.4 (13 Jan 2021 04:08), Max: 36.6 (12 Jan 2021 23:38)  T(F): 97.6 (13 Jan 2021 04:08), Max: 97.8 (12 Jan 2021 23:38)  HR: 72 (13 Jan 2021 04:08) (71 - 78)  BP: 113/59 (13 Jan 2021 04:08) (108/70 - 118/75)  BP(mean): --  RR: 18 (13 Jan 2021 04:08) (18 - 18)  SpO2: 97% (13 Jan 2021 04:08) (96% - 97%)    MEDICATIONS  (STANDING):  amLODIPine   Tablet 5 milliGRAM(s) Oral daily  atorvastatin 80 milliGRAM(s) Oral at bedtime  dextrose 40% Gel 15 Gram(s) Oral once  dextrose 5%. 1000 milliLiter(s) (50 mL/Hr) IV Continuous <Continuous>  dextrose 5%. 1000 milliLiter(s) (100 mL/Hr) IV Continuous <Continuous>  dextrose 50% Injectable 25 Gram(s) IV Push once  dextrose 50% Injectable 12.5 Gram(s) IV Push once  dextrose 50% Injectable 25 Gram(s) IV Push once  donepezil 10 milliGRAM(s) Oral at bedtime  gabapentin 100 milliGRAM(s) Oral three times a day  glucagon  Injectable 1 milliGRAM(s) IntraMuscular once  insulin lispro (ADMELOG) corrective regimen sliding scale   SubCutaneous three times a day before meals  insulin lispro (ADMELOG) corrective regimen sliding scale   SubCutaneous at bedtime    MEDICATIONS  (PRN):    General:  Constitutional: Obese Female, appears stated age, in no apparent distress including pain  Head: Normocephalic & atraumatic.  Extremities: No cyanosis, clubbing, or edema.    Neurological (>12):  MS: Awake, alert, oriented to person, place, situation, time. Normal affect. Follows all commands including crossed commands.     Language: Speech is clear, accented, fluent with good repetition & comprehension (able to name objects)    CNs: PERRLA (R = 3mm, L = 3mm). VF intact in all 4 quadrants. EOMI no nystagmus, no diplopia. V1-3 intact to LT, well developed masseter muscles b/l. No facial asymmetry b/l, full eye closure strength b/l. Symmetric palate elevation in midline. Shoulder shrug intact b/l. Tongue midline, normal movements, no atrophy.    Motor: Normal muscle bulk & tone. No noticeable tremor or seizure. No pronator drift.              Deltoid	Biceps	Triceps	   R	5	5	5	5		  L	5	5	5	5    	H-Flex	H-Ext	H-ABd	H-ADd	K-Flex	K-Ext	D-Flex	P-Flex  R	3	3	3	3	4-	4-	3	4-	   L	5	5	4	4	4-	5	4+	4+	     Sensation: Reduced sensation to light touch and pinprick beginning at T12/L1 level. Reduced sensation to LT/pinprick/vibratory sense in LE as compared with UE. More sensation on LLE>RLE. Intact proprioception throughout.     Reflexes:              Biceps(C5)       BR(C6)     Triceps(C7)               Patellar(L4)    Achilles(S1)    Plantar Resp  R	2	          2	             2		        3		    1		Mute  L	2	          2	             2		        2		    1		Mute     Coordination: . No dysmetria to FTN    Gait: Unable to stand without 2 person assist. Unable to take steps.        Other:    01-12    136  |  98  |  26<H>  ----------------------------<  237<H>  4.1   |  26  |  0.97    Ca    9.5      12 Jan 2021 06:06    TPro  7.0  /  Alb  3.7  /  TBili  0.6  /  DBili  x   /  AST  13  /  ALT  16  /  AlkPhos  128<H>  01-12 01-12    136  |  98  |  26<H>  ----------------------------<  237<H>  4.1   |  26  |  0.97    Ca    9.5      12 Jan 2021 06:06    TPro  7.0  /  Alb  3.7  /  TBili  0.6  /  DBili  x   /  AST  13  /  ALT  16  /  AlkPhos  128<H>  01-12    LIVER FUNCTIONS - ( 12 Jan 2021 06:06 )  Alb: 3.7 g/dL / Pro: 7.0 g/dL / ALK PHOS: 128 U/L / ALT: 16 U/L / AST: 13 U/L / GGT: x                                 13.6   9.19  )-----------( 262      ( 12 Jan 2021 06:06 )             41.0     Radiology  < from: MR Lumbar Spine w/wo IV Cont (01.12.21 @ 12:39) >  IMPRESSION: Focus of abnormal signal within the cord at T10 does not enhance with contrast. Differential diagnosis includes infectious, inflammatory and demyelinating processes.  < end of copied text >                  Neurology Follow up note  Patient is a 65y old  Female who presents with a chief complaint of inability to ambulate (12 Jan 2021 09:07)    Subjective:  Interval History - No events overnight    Objective:   Vital Signs Last 24 Hrs  T(C): 36.4 (13 Jan 2021 04:08), Max: 36.6 (12 Jan 2021 23:38)  T(F): 97.6 (13 Jan 2021 04:08), Max: 97.8 (12 Jan 2021 23:38)  HR: 72 (13 Jan 2021 04:08) (71 - 78)  BP: 113/59 (13 Jan 2021 04:08) (108/70 - 118/75)  BP(mean): --  RR: 18 (13 Jan 2021 04:08) (18 - 18)  SpO2: 97% (13 Jan 2021 04:08) (96% - 97%)    MEDICATIONS  (STANDING):  amLODIPine   Tablet 5 milliGRAM(s) Oral daily  atorvastatin 80 milliGRAM(s) Oral at bedtime  dextrose 40% Gel 15 Gram(s) Oral once  dextrose 5%. 1000 milliLiter(s) (50 mL/Hr) IV Continuous <Continuous>  dextrose 5%. 1000 milliLiter(s) (100 mL/Hr) IV Continuous <Continuous>  dextrose 50% Injectable 25 Gram(s) IV Push once  dextrose 50% Injectable 12.5 Gram(s) IV Push once  dextrose 50% Injectable 25 Gram(s) IV Push once  donepezil 10 milliGRAM(s) Oral at bedtime  gabapentin 100 milliGRAM(s) Oral three times a day  glucagon  Injectable 1 milliGRAM(s) IntraMuscular once  insulin lispro (ADMELOG) corrective regimen sliding scale   SubCutaneous three times a day before meals  insulin lispro (ADMELOG) corrective regimen sliding scale   SubCutaneous at bedtime    MEDICATIONS  (PRN):    General:  Constitutional: Obese Female in no apparent distress  Neurological (>12):  MS: Awake, alert, oriented to person, place, situation, time. Normal affect. Follows all commands including crossed commands.   Language: Speech is clear, accented, fluent.   CNs: PERRL, VF grossly intact. EOMI no nystagmus, no diplopia. No obvious facial asymmetry. Symmetric palate elevation in midline. Shoulder shrug intact b/l. Tongue midline, normal movements, no atrophy.    Motor: Normal muscle bulk & tone. No noticeable tremor or seizure. No pronator drift.              Deltoid	Biceps	Triceps	   R	5	5	5	5		  L	5	5	5	5    	H-Flex	H-Ext	H-ABd	H-ADd	K-Flex	K-Ext	D-Flex	P-Flex  R	3	3	3	3	4-	4-	3	4-	   L	5	5	4	4	4-	5	4	5	     Sensation: Reduced sensation to light touch and pinprick beginning at T12/L1 level. Loss of position sense on RLE.     Reflexes:              Biceps(C5)       BR(C6)     Triceps(C7)               Patellar(L4)    Achilles(S1)    Plantar Resp  R	2	          2	             2		        3		    1		Mute  L	2	          2	             2		        2		    1		Mute     Coordination: Deferred  Gait: Deferred        Other:    01-12    136  |  98  |  26<H>  ----------------------------<  237<H>  4.1   |  26  |  0.97    Ca    9.5      12 Jan 2021 06:06    TPro  7.0  /  Alb  3.7  /  TBili  0.6  /  DBili  x   /  AST  13  /  ALT  16  /  AlkPhos  128<H>  01-12 01-12    136  |  98  |  26<H>  ----------------------------<  237<H>  4.1   |  26  |  0.97    Ca    9.5      12 Jan 2021 06:06    TPro  7.0  /  Alb  3.7  /  TBili  0.6  /  DBili  x   /  AST  13  /  ALT  16  /  AlkPhos  128<H>  01-12    LIVER FUNCTIONS - ( 12 Jan 2021 06:06 )  Alb: 3.7 g/dL / Pro: 7.0 g/dL / ALK PHOS: 128 U/L / ALT: 16 U/L / AST: 13 U/L / GGT: x                                 13.6   9.19  )-----------( 262      ( 12 Jan 2021 06:06 )             41.0     Radiology  < from: MR Lumbar Spine w/wo IV Cont (01.12.21 @ 12:39) >  IMPRESSION: Focus of abnormal signal within the cord at T10 does not enhance with contrast. Differential diagnosis includes infectious, inflammatory and demyelinating processes.  < end of copied text >

## 2021-01-13 NOTE — PROGRESS NOTE ADULT - SUBJECTIVE AND OBJECTIVE BOX
Clinical Indication:    PREPROCEDURE:    Patient presents for diagnostic lumbar puncture.  Risks and benefits were discussed with patient and/or health care proxy.  Risks include but are not limited to headache, bleeding, infection and nerve damage.    Patient and/or health care proxy understands and consents to procedure.    POSTPROCEDURE:    Lumbar puncture was performed at the L2/3    level.       Patient tolerated the procedure well.  CSF delivered to the lab.

## 2021-01-14 LAB
CSF PCR RESULT: SIGNIFICANT CHANGE UP
GLUCOSE BLDC GLUCOMTR-MCNC: 159 MG/DL — HIGH (ref 70–99)
GLUCOSE BLDC GLUCOMTR-MCNC: 175 MG/DL — HIGH (ref 70–99)
GLUCOSE BLDC GLUCOMTR-MCNC: 217 MG/DL — HIGH (ref 70–99)
GLUCOSE BLDC GLUCOMTR-MCNC: 249 MG/DL — HIGH (ref 70–99)
GRAM STN FLD: SIGNIFICANT CHANGE UP
NON-GYNECOLOGICAL CYTOLOGY STUDY: SIGNIFICANT CHANGE UP
SPECIMEN SOURCE: SIGNIFICANT CHANGE UP
TM INTERPRETATION: SIGNIFICANT CHANGE UP

## 2021-01-14 PROCEDURE — 99232 SBSQ HOSP IP/OBS MODERATE 35: CPT

## 2021-01-14 PROCEDURE — 99232 SBSQ HOSP IP/OBS MODERATE 35: CPT | Mod: GC

## 2021-01-14 RX ADMIN — ENOXAPARIN SODIUM 40 MILLIGRAM(S): 100 INJECTION SUBCUTANEOUS at 22:44

## 2021-01-14 RX ADMIN — Medication 1: at 09:11

## 2021-01-14 RX ADMIN — CLOPIDOGREL BISULFATE 75 MILLIGRAM(S): 75 TABLET, FILM COATED ORAL at 10:37

## 2021-01-14 RX ADMIN — GABAPENTIN 100 MILLIGRAM(S): 400 CAPSULE ORAL at 22:44

## 2021-01-14 RX ADMIN — ATORVASTATIN CALCIUM 80 MILLIGRAM(S): 80 TABLET, FILM COATED ORAL at 22:44

## 2021-01-14 RX ADMIN — GABAPENTIN 100 MILLIGRAM(S): 400 CAPSULE ORAL at 06:26

## 2021-01-14 RX ADMIN — GABAPENTIN 100 MILLIGRAM(S): 400 CAPSULE ORAL at 13:12

## 2021-01-14 RX ADMIN — AMLODIPINE BESYLATE 5 MILLIGRAM(S): 2.5 TABLET ORAL at 06:26

## 2021-01-14 RX ADMIN — Medication 81 MILLIGRAM(S): at 10:37

## 2021-01-14 RX ADMIN — Medication 2: at 13:12

## 2021-01-14 RX ADMIN — Medication 1: at 18:37

## 2021-01-14 RX ADMIN — DONEPEZIL HYDROCHLORIDE 10 MILLIGRAM(S): 10 TABLET, FILM COATED ORAL at 22:44

## 2021-01-14 NOTE — PROGRESS NOTE ADULT - ASSESSMENT
Temi Staton is a 65 year old RH Portuguese speaking female with a past medical history of HTN, DM, HLD, peripheral artery stents, memory loss who presents with inability to ambulate and lower extremity weakness.    Impression: Lower extremity weakness RLE>LLE with inability to ambulate and MR concerning for an inflammatory vs demyelinating vs ischemic lesion. Concern for transverse myelitis at this time, given CSF pleocytosis and elevated protein with clinical presentation of weakness and sensory changes and MR findings of abnormal signal at T10 spinal cord. Clinical course of improvement also befitting of transverse myelitis. Less likely ischemic etiology given improvement of symptoms clinically and subjectively and not following natural course of stroke. Less likely infectious etiology given normal WBC count, non-infectious appearance, negative Gram stain and PCR results on CSF.     Plan:  LE weakness and inability to ambulate  [] Solumedrol ____ x 3 days with Protonix ____  [] f/u B12, Vit E, copper, folate, TSH, RENNY, ANCA, Sjogrens Ab, RF, ACE  [] PT/OT    DM  [] BGM and ISS    HTN  [] continue home amlodipine    PAD  [] aspirin and Plavix    HLD  [] continue home Lipitor Temi Staton is a 65 year old RH Japanese speaking female with a past medical history of HTN, DM, HLD, peripheral artery stents, memory loss who presents with inability to ambulate and lower extremity weakness.    Impression: Lower extremity weakness RLE>LLE with inability to ambulate and MR concerning for an inflammatory vs demyelinating vs ischemic lesion. Concern for transverse myelitis at this time, given CSF pleocytosis and elevated protein with clinical presentation of weakness and sensory changes and MR findings of abnormal signal at T10 spinal cord. Clinical course of improvement also befitting of transverse myelitis. Less likely ischemic etiology given improvement of symptoms clinically and subjectively and not following natural course of stroke. Less likely infectious etiology given normal WBC count, non-infectious appearance, negative Gram stain and PCR results on CSF.     Plan:  LE weakness and inability to ambulate  [] f/u B12, Vit E, copper, folate, TSH, RENNY, ANCA, Sjogrens Ab, RF, ACE  [] PT/OT, reassess ambulation given improvement on clinical exam    DM  [] BGM and ISS    HTN  [] continue home amlodipine    PAD  [] aspirin and Plavix    HLD  [] continue home Lipitor

## 2021-01-14 NOTE — PROGRESS NOTE ADULT - SUBJECTIVE AND OBJECTIVE BOX
Neurology Follow up note  Patient is a 65y old  Female who presents with a chief complaint of inability to ambulate (12 Jan 2021 09:07)    Subjective:  Interval History - No events overnight    Objective:   Vital Signs Last 24 Hrs  T(C): 36.6 (14 Jan 2021 04:30), Max: 37.1 (13 Jan 2021 17:14)  T(F): 97.8 (14 Jan 2021 04:30), Max: 98.8 (13 Jan 2021 17:14)  HR: 64 (14 Jan 2021 04:30) (64 - 86)  BP: 123/71 (14 Jan 2021 04:30) (107/60 - 153/71)  BP(mean): --  RR: 18 (14 Jan 2021 04:30) (18 - 18)  SpO2: 99% (14 Jan 2021 04:30) (97% - 99%)    MEDICATIONS  (STANDING):  amLODIPine   Tablet 5 milliGRAM(s) Oral daily  aspirin  chewable 81 milliGRAM(s) Oral daily  atorvastatin 80 milliGRAM(s) Oral at bedtime  clopidogrel Tablet 75 milliGRAM(s) Oral daily  dextrose 40% Gel 15 Gram(s) Oral once  dextrose 5%. 1000 milliLiter(s) (50 mL/Hr) IV Continuous <Continuous>  dextrose 5%. 1000 milliLiter(s) (100 mL/Hr) IV Continuous <Continuous>  dextrose 50% Injectable 25 Gram(s) IV Push once  dextrose 50% Injectable 12.5 Gram(s) IV Push once  dextrose 50% Injectable 25 Gram(s) IV Push once  donepezil 10 milliGRAM(s) Oral at bedtime  enoxaparin Injectable 40 milliGRAM(s) SubCutaneous at bedtime  gabapentin 100 milliGRAM(s) Oral three times a day  glucagon  Injectable 1 milliGRAM(s) IntraMuscular once  insulin lispro (ADMELOG) corrective regimen sliding scale   SubCutaneous three times a day before meals  insulin lispro (ADMELOG) corrective regimen sliding scale   SubCutaneous at bedtime    MEDICATIONS  (PRN):      General:  Constitutional: Obese Female in no apparent distress  Neurological (>12):  MS: Awake, alert, oriented to person, place, situation, time. Normal affect. Follows all commands including crossed commands.   Language: Speech is clear, accented, fluent.   CNs: PERRL, VF grossly intact. EOMI no nystagmus, no diplopia. No obvious facial asymmetry. Symmetric palate elevation in midline. Shoulder shrug intact b/l. Tongue midline, normal movements, no atrophy.    Motor: Normal muscle bulk & tone. No noticeable tremor or seizure. No pronator drift.              Deltoid	Biceps	Triceps	   R	5	5	5	5		  L	5	5	5	5    	H-Flex	H-Ext	H-ABd	H-ADd	K-Flex	K-Ext	D-Flex	P-Flex  R	3	3	3	3	4-	4-	3	4-	   L	5	5	4	4	4-	5	4	5	     Sensation: Reduced sensation to light touch and pinprick beginning at T12/L1 level. Loss of position sense on RLE.     Reflexes:              Biceps(C5)       BR(C6)     Triceps(C7)               Patellar(L4)    Achilles(S1)    Plantar Resp  R	2	          2	             2		        3		    1		Mute  L	2	          2	             2		        2		    1		Mute     Coordination: Deferred  Gait: Deferred      Other:    Culture - CSF with Gram Stain . (01.14.21 @ 00:11)    Gram Stain:   polymorphonuclear leukocytes seen  No organisms seen  by cytocentrifuge    Specimen Source: .CSF CSF... lumbar tap    CSF PCR Panel (01.13.21 @ 19:11)    CSF PCR Result: NotDetec: The meningitis/encephalitis (ME) panel (CSFPCR) is a PCR based assay that  screens for: Escherichia coli; Haemophilus influenzae; Listeria  monocytogenes; Neisseria meningitidis; Streptococcus agalactiae;  Streptococcus pneumoniae; CMV; Enterovirus; HSV-1; HSV-2; Human  herpesvirus 6; Parechovirus; VZV and Cryptococcus. Result should be  interpreted in context of clinical presentation, imaging and other lab  tests. Positive predictive value may be lower in patients with normal CSF  chemistry and cell count.    Cerebrospinal Fluid Cell Count-1 (01.13.21 @ 16:30)    Tube Type: Tube 4    CSF Appearance: Clear    CSF Lymphocytes: 28 %    CSF Monocytes/Macrophages: 14 %    CSF Segmented Neutrophils: 57 %    Total Nucleated Cell Count, CSF: 6 /uL    RBC Count - Spinal Fluid: 280 /uL    CSF Color: No Color    Eosinophil Count - Spinal Fluid: 1 %    Glucose, CSF (01.13.21 @ 16:29)    Glucose, CSF: 122 mg/dL    Protein, CSF (01.13.21 @ 16:29)    Protein, CSF: 78 mg/dL      01-12    136  |  98  |  26<H>  ----------------------------<  237<H>  4.1   |  26  |  0.97    Ca    9.5      12 Jan 2021 06:06    TPro  7.0  /  Alb  3.7  /  TBili  0.6  /  DBili  x   /  AST  13  /  ALT  16  /  AlkPhos  128<H>  01-12 01-12    136  |  98  |  26<H>  ----------------------------<  237<H>  4.1   |  26  |  0.97    Ca    9.5      12 Jan 2021 06:06    TPro  7.0  /  Alb  3.7  /  TBili  0.6  /  DBili  x   /  AST  13  /  ALT  16  /  AlkPhos  128<H>  01-12    LIVER FUNCTIONS - ( 12 Jan 2021 06:06 )  Alb: 3.7 g/dL / Pro: 7.0 g/dL / ALK PHOS: 128 U/L / ALT: 16 U/L / AST: 13 U/L / GGT: x                                 13.6   9.19  )-----------( 262      ( 12 Jan 2021 06:06 )             41.0     Radiology  < from: MR Lumbar Spine w/wo IV Cont (01.12.21 @ 12:39) >  IMPRESSION: Focus of abnormal signal within the cord at T10 does not enhance with contrast. Differential diagnosis includes infectious, inflammatory and demyelinating processes.  < end of copied text >                Neurology Follow up note  Patient is a 65y old  Female who presents with a chief complaint of inability to ambulate (12 Jan 2021 09:07)    Subjective:  Interval History - No events overnight    Objective:   Vital Signs Last 24 Hrs  T(C): 36.6 (14 Jan 2021 04:30), Max: 37.1 (13 Jan 2021 17:14)  T(F): 97.8 (14 Jan 2021 04:30), Max: 98.8 (13 Jan 2021 17:14)  HR: 64 (14 Jan 2021 04:30) (64 - 86)  BP: 123/71 (14 Jan 2021 04:30) (107/60 - 153/71)  BP(mean): --  RR: 18 (14 Jan 2021 04:30) (18 - 18)  SpO2: 99% (14 Jan 2021 04:30) (97% - 99%)    MEDICATIONS  (STANDING):  amLODIPine   Tablet 5 milliGRAM(s) Oral daily  aspirin  chewable 81 milliGRAM(s) Oral daily  atorvastatin 80 milliGRAM(s) Oral at bedtime  clopidogrel Tablet 75 milliGRAM(s) Oral daily  dextrose 40% Gel 15 Gram(s) Oral once  dextrose 5%. 1000 milliLiter(s) (50 mL/Hr) IV Continuous <Continuous>  dextrose 5%. 1000 milliLiter(s) (100 mL/Hr) IV Continuous <Continuous>  dextrose 50% Injectable 25 Gram(s) IV Push once  dextrose 50% Injectable 12.5 Gram(s) IV Push once  dextrose 50% Injectable 25 Gram(s) IV Push once  donepezil 10 milliGRAM(s) Oral at bedtime  enoxaparin Injectable 40 milliGRAM(s) SubCutaneous at bedtime  gabapentin 100 milliGRAM(s) Oral three times a day  glucagon  Injectable 1 milliGRAM(s) IntraMuscular once  insulin lispro (ADMELOG) corrective regimen sliding scale   SubCutaneous three times a day before meals  insulin lispro (ADMELOG) corrective regimen sliding scale   SubCutaneous at bedtime    MEDICATIONS  (PRN):      General:  Constitutional: Obese Female in no apparent distress  Neurological (>12):  MS: Awake, alert, oriented to person, place, situation, time. Normal affect. Follows all commands including crossed commands.   Language: Speech is clear, accented, fluent.   CNs: PERRL, VF grossly intact. EOMI no nystagmus, no diplopia. No obvious facial asymmetry. Symmetric palate elevation in midline. Shoulder shrug intact b/l. Tongue midline, normal movements, no atrophy.    Motor: Normal muscle bulk & tone. No noticeable tremor or seizure. No pronator drift.              Deltoid	Biceps	Triceps	   R	5	5	5	5		  L	5	5	5	5    	H-Flex	H-Ext	H-ABd	H-ADd	K-Flex	K-Ext	D-Flex	P-Flex  R	4	4	4-	4-	4-	4-	3	4-	   L	5	5	4	4	4-	5	4	5	     Sensation: Reduced sensation to light touch and pinprick beginning at T12/L1 level. Loss of position sense on RLE.     Reflexes:              Biceps(C5)       BR(C6)     Triceps(C7)               Patellar(L4)    Achilles(S1)    Plantar Resp  R	2	          2	             2		        3		    1		Mute  L	2	          2	             2		        3		    1		Mute     Coordination: Deferred  Gait: Deferred      Other:    Culture - CSF with Gram Stain . (01.14.21 @ 00:11)    Gram Stain:   polymorphonuclear leukocytes seen  No organisms seen  by cytocentrifuge    Specimen Source: .CSF CSF... lumbar tap    CSF PCR Panel (01.13.21 @ 19:11)    CSF PCR Result: NotDetec: The meningitis/encephalitis (ME) panel (CSFPCR) is a PCR based assay that  screens for: Escherichia coli; Haemophilus influenzae; Listeria  monocytogenes; Neisseria meningitidis; Streptococcus agalactiae;  Streptococcus pneumoniae; CMV; Enterovirus; HSV-1; HSV-2; Human  herpesvirus 6; Parechovirus; VZV and Cryptococcus. Result should be  interpreted in context of clinical presentation, imaging and other lab  tests. Positive predictive value may be lower in patients with normal CSF  chemistry and cell count.    Cerebrospinal Fluid Cell Count-1 (01.13.21 @ 16:30)    Tube Type: Tube 4    CSF Appearance: Clear    CSF Lymphocytes: 28 %    CSF Monocytes/Macrophages: 14 %    CSF Segmented Neutrophils: 57 %    Total Nucleated Cell Count, CSF: 6 /uL    RBC Count - Spinal Fluid: 280 /uL    CSF Color: No Color    Eosinophil Count - Spinal Fluid: 1 %    Glucose, CSF (01.13.21 @ 16:29)    Glucose, CSF: 122 mg/dL    Protein, CSF (01.13.21 @ 16:29)    Protein, CSF: 78 mg/dL      01-12    136  |  98  |  26<H>  ----------------------------<  237<H>  4.1   |  26  |  0.97    Ca    9.5      12 Jan 2021 06:06    TPro  7.0  /  Alb  3.7  /  TBili  0.6  /  DBili  x   /  AST  13  /  ALT  16  /  AlkPhos  128<H>  01-12 01-12    136  |  98  |  26<H>  ----------------------------<  237<H>  4.1   |  26  |  0.97    Ca    9.5      12 Jan 2021 06:06    TPro  7.0  /  Alb  3.7  /  TBili  0.6  /  DBili  x   /  AST  13  /  ALT  16  /  AlkPhos  128<H>  01-12    LIVER FUNCTIONS - ( 12 Jan 2021 06:06 )  Alb: 3.7 g/dL / Pro: 7.0 g/dL / ALK PHOS: 128 U/L / ALT: 16 U/L / AST: 13 U/L / GGT: x                                 13.6   9.19  )-----------( 262      ( 12 Jan 2021 06:06 )             41.0     Radiology  < from: MR Lumbar Spine w/wo IV Cont (01.12.21 @ 12:39) >  IMPRESSION: Focus of abnormal signal within the cord at T10 does not enhance with contrast. Differential diagnosis includes infectious, inflammatory and demyelinating processes.  < end of copied text >

## 2021-01-14 NOTE — PROGRESS NOTE ADULT - SUBJECTIVE AND OBJECTIVE BOX
Patient is a 65y old  Female who presents with a chief complaint of inability to ambulate (13 Jan 2021 07:27)    Patient without complaints this morning,.  No CP, no SOB, no N/V.  Tolerating therapies.    MEDICATIONS  (STANDING):  amLODIPine   Tablet 5 milliGRAM(s) Oral daily  aspirin  chewable 81 milliGRAM(s) Oral daily  atorvastatin 80 milliGRAM(s) Oral at bedtime  clopidogrel Tablet 75 milliGRAM(s) Oral daily  dextrose 40% Gel 15 Gram(s) Oral once  dextrose 5%. 1000 milliLiter(s) (50 mL/Hr) IV Continuous <Continuous>  dextrose 5%. 1000 milliLiter(s) (100 mL/Hr) IV Continuous <Continuous>  dextrose 50% Injectable 25 Gram(s) IV Push once  dextrose 50% Injectable 12.5 Gram(s) IV Push once  dextrose 50% Injectable 25 Gram(s) IV Push once  donepezil 10 milliGRAM(s) Oral at bedtime  enoxaparin Injectable 40 milliGRAM(s) SubCutaneous at bedtime  gabapentin 100 milliGRAM(s) Oral three times a day  glucagon  Injectable 1 milliGRAM(s) IntraMuscular once  insulin lispro (ADMELOG) corrective regimen sliding scale   SubCutaneous three times a day before meals  insulin lispro (ADMELOG) corrective regimen sliding scale   SubCutaneous at bedtime    MEDICATIONS  (PRN):    Vital Signs Last 24 Hrs  T(C): 36.6 (14 Jan 2021 08:38), Max: 37.1 (13 Jan 2021 17:14)  T(F): 97.8 (14 Jan 2021 08:38), Max: 98.8 (13 Jan 2021 17:14)  HR: 64 (14 Jan 2021 08:38) (64 - 86)  BP: 121/74 (14 Jan 2021 08:38) (107/60 - 153/71)  BP(mean): --  RR: 18 (14 Jan 2021 08:38) (18 - 18)  SpO2: 99% (14 Jan 2021 08:38) (97% - 99%)    PHYSICAL EXAM  Constitutional - NAD, Comfortable  HEENT - NCAT, EOMI  Neck - Supple, No limited ROM  Chest - CTA bilaterally, No wheeze, No rhonchi, No crackles  Cardiovascular - RRR, S1S2, No murmurs  Abdomen - BS+, Soft, NTND  Extremities - No C/C/E, No calf tenderness   Neurologic Exam -                   AAO x 3    Speech clear/fluent/intelligible    Motor 4/5 RLE, 4+/5 LLE, 5/5 bl UE    Sens intact    No clonus  Psychiatric - Mood stable, Affect WNL    Impression:  66 yo with functional deficits secondary to diagnosis of lower extremity weakness secondary to transverse myelitis.  Plan:  PT- ROM, Bed Mob, Transfers, Amb w AD and bracing as needed  OT- ADLs, bracing  Prec- Falls, Cardiac  DVT Prophylaxis- SCDs, Lovenox  Skin- Turn q2 h  Dispo- Acute Rehab- can tolerate 3h/d of therapies and requires daily physician visits

## 2021-01-15 LAB
ALBUMIN CSF-MCNC: 63.6 MG/DL — HIGH (ref 14–25)
ALBUMIN SERPL ELPH-MCNC: 3326 MG/DL — LOW (ref 3500–5200)
GLUCOSE BLDC GLUCOMTR-MCNC: 167 MG/DL — HIGH (ref 70–99)
GLUCOSE BLDC GLUCOMTR-MCNC: 265 MG/DL — HIGH (ref 70–99)
GLUCOSE BLDC GLUCOMTR-MCNC: 280 MG/DL — HIGH (ref 70–99)
GLUCOSE BLDC GLUCOMTR-MCNC: 415 MG/DL — HIGH (ref 70–99)
GLUCOSE BLDC GLUCOMTR-MCNC: 426 MG/DL — HIGH (ref 70–99)
IGG CSF-MCNC: 11.5 MG/DL — HIGH
IGG FLD-MCNC: 1021 MG/DL — SIGNIFICANT CHANGE UP (ref 610–1660)
IGG SYNTH RATE SER+CSF CALC-MRATE: 11.2 MG/DAY — HIGH
IGG/ALB CLEAR SER+CSF-RTO: 0.6 — SIGNIFICANT CHANGE UP
IGG/ALB CSF: 0.18 RATIO — SIGNIFICANT CHANGE UP
IGG/ALB SER: 0.31 RATIO — SIGNIFICANT CHANGE UP
PROT CSF-MCNC: 104 MG/DL — HIGH (ref 15–45)

## 2021-01-15 PROCEDURE — 99231 SBSQ HOSP IP/OBS SF/LOW 25: CPT | Mod: GC

## 2021-01-15 PROCEDURE — 99232 SBSQ HOSP IP/OBS MODERATE 35: CPT

## 2021-01-15 RX ORDER — PANTOPRAZOLE SODIUM 20 MG/1
40 TABLET, DELAYED RELEASE ORAL
Refills: 0 | Status: DISCONTINUED | OUTPATIENT
Start: 2021-01-15 | End: 2021-01-19

## 2021-01-15 RX ORDER — INSULIN LISPRO 100/ML
5 VIAL (ML) SUBCUTANEOUS ONCE
Refills: 0 | Status: COMPLETED | OUTPATIENT
Start: 2021-01-15 | End: 2021-01-15

## 2021-01-15 RX ADMIN — GABAPENTIN 100 MILLIGRAM(S): 400 CAPSULE ORAL at 05:26

## 2021-01-15 RX ADMIN — AMLODIPINE BESYLATE 5 MILLIGRAM(S): 2.5 TABLET ORAL at 05:26

## 2021-01-15 RX ADMIN — Medication 5 UNIT(S): at 21:47

## 2021-01-15 RX ADMIN — Medication 81 MILLIGRAM(S): at 12:14

## 2021-01-15 RX ADMIN — ATORVASTATIN CALCIUM 80 MILLIGRAM(S): 80 TABLET, FILM COATED ORAL at 21:37

## 2021-01-15 RX ADMIN — Medication 58 MILLIGRAM(S): at 12:14

## 2021-01-15 RX ADMIN — DONEPEZIL HYDROCHLORIDE 10 MILLIGRAM(S): 10 TABLET, FILM COATED ORAL at 21:37

## 2021-01-15 RX ADMIN — CLOPIDOGREL BISULFATE 75 MILLIGRAM(S): 75 TABLET, FILM COATED ORAL at 12:14

## 2021-01-15 RX ADMIN — PANTOPRAZOLE SODIUM 40 MILLIGRAM(S): 20 TABLET, DELAYED RELEASE ORAL at 09:31

## 2021-01-15 RX ADMIN — GABAPENTIN 100 MILLIGRAM(S): 400 CAPSULE ORAL at 21:37

## 2021-01-15 RX ADMIN — Medication 1: at 09:31

## 2021-01-15 RX ADMIN — Medication 3: at 12:15

## 2021-01-15 RX ADMIN — Medication 3: at 17:46

## 2021-01-15 RX ADMIN — GABAPENTIN 100 MILLIGRAM(S): 400 CAPSULE ORAL at 12:14

## 2021-01-15 RX ADMIN — ENOXAPARIN SODIUM 40 MILLIGRAM(S): 100 INJECTION SUBCUTANEOUS at 21:37

## 2021-01-15 NOTE — PROGRESS NOTE ADULT - SUBJECTIVE AND OBJECTIVE BOX
SUBJECTIVE: Patient seen and examined at the bedside.     INTERVAL HISTORY:    PAST MEDICAL & SURGICAL HISTORY:  PAD (peripheral artery disease)    Memory loss    HTN (hypertension)    DM (diabetes mellitus)    S/P peripheral artery angioplasty with stent placement      FAMILY HISTORY:  No pertinent family history in first degree relatives      SOCIAL HISTORY:   T/E/D:   Occupation:   Lives with:     MEDICATIONS (HOME):  Home Medications:  amLODIPine 5 mg oral tablet: 1 tab(s) orally once a day (12 Jan 2021 03:03)  aspirin 81 mg oral tablet, chewable: 1 tab(s) orally once a day (12 Jan 2021 03:03)  donepezil 10 mg oral tablet: 1 tab(s) orally once a day (at bedtime) (12 Jan 2021 03:03)  gabapentin 100 mg oral capsule: 1 cap(s) orally 3 times a day (12 Jan 2021 03:02)  glipiZIDE 10 mg oral tablet: 1 tab(s) orally once a day (12 Jan 2021 03:02)  Lipitor 80 mg oral tablet: 1 tab(s) orally once a day (12 Jan 2021 03:03)  metFORMIN 1000 mg oral tablet: 1 tab(s) orally 2 times a day (12 Jan 2021 03:03)  Plavix 75 mg oral tablet: 1 tab(s) orally once a day (12 Jan 2021 03:02)  Vascepa 1 g oral capsule: 2 cap(s) orally 2 times a day (12 Jan 2021 03:04)    MEDICATIONS  (STANDING):  amLODIPine   Tablet 5 milliGRAM(s) Oral daily  aspirin  chewable 81 milliGRAM(s) Oral daily  atorvastatin 80 milliGRAM(s) Oral at bedtime  clopidogrel Tablet 75 milliGRAM(s) Oral daily  dextrose 40% Gel 15 Gram(s) Oral once  dextrose 5%. 1000 milliLiter(s) (50 mL/Hr) IV Continuous <Continuous>  dextrose 5%. 1000 milliLiter(s) (100 mL/Hr) IV Continuous <Continuous>  dextrose 50% Injectable 25 Gram(s) IV Push once  dextrose 50% Injectable 12.5 Gram(s) IV Push once  dextrose 50% Injectable 25 Gram(s) IV Push once  donepezil 10 milliGRAM(s) Oral at bedtime  enoxaparin Injectable 40 milliGRAM(s) SubCutaneous at bedtime  gabapentin 100 milliGRAM(s) Oral three times a day  glucagon  Injectable 1 milliGRAM(s) IntraMuscular once  insulin lispro (ADMELOG) corrective regimen sliding scale   SubCutaneous three times a day before meals  insulin lispro (ADMELOG) corrective regimen sliding scale   SubCutaneous at bedtime  methylPREDNISolone sodium succinate IVPB 1000 milliGRAM(s) IV Intermittent daily  pantoprazole    Tablet 40 milliGRAM(s) Oral before breakfast    MEDICATIONS  (PRN):    ALLERGIES/INTOLERANCES:  Allergies  No Known Allergies    Intolerances    VITALS & EXAMINATION:  Vital Signs Last 24 Hrs  T(C): 36.4 (15 Star 2021 04:45), Max: 36.8 (14 Jan 2021 19:52)  T(F): 97.5 (15 Star 2021 04:45), Max: 98.2 (14 Jan 2021 19:52)  HR: 79 (15 Star 2021 04:45) (64 - 79)  BP: 113/67 (15 Star 2021 04:45) (105/62 - 121/74)  BP(mean): --  RR: 18 (15 Star 2021 04:45) (18 - 19)  SpO2: 98% (15 Star 2021 04:45) (98% - 99%)    General:  Constitutional: Obese Female, appears stated age, in no apparent distress including pain  Head: Normocephalic & atraumatic.  ENT: Patent ear canals, intact TM, mucus membranes moist & pink, neck supple, no lymphadenopathy.   Respiratory: Patent airway. All lung fields are clear to auscultation bilaterally.  Extremities: No cyanosis, clubbing, or edema.  Skin: No rashes, bruising, or discoloration.    Cardiovascular (>2): RRR no murmurs. Carotid pulsations symmetric, no bruits. Normal capillary beds refill, 1-2 seconds or less.     Neurological (>12):  MS: Awake, alert, oriented to person, place, situation, time. Normal affect. Follows all commands.    Language: Speech is clear, fluent with good repetition & comprehension (able to name objects___)    CNs: PERRLA (R = 3mm, L = 3mm). VFF. EOMI no nystagmus, no diplopia. V1-3 intact to LT/pinprick, well developed masseter muscles b/l. No facial asymmetry b/l, full eye closure strength b/l. Hearing grossly normal (rubbing fingers) b/l. Symmetric palate elevation in midline. Gag reflex deferred. Head turning & shoulder shrug intact b/l. Tongue midline, normal movements, no atrophy.    Fundoscopic: pale w/ sharp discs margins No vascular changes.      Motor: Normal muscle bulk & tone. No noticeable tremor or seizure. No pronator drift.              Deltoid	Biceps	Triceps	Wrist	Finger ABd	   R	5	5	5	5	5		5 	  L	5	5	5	5	5		5    	H-Flex	H-Ext	H-ABd	H-ADd	K-Flex	K-Ext	D-Flex	P-Flex  R	5	5	5	5	5	5	5	5 	   L	5	5	5	5	5	5	5	5	     Sensation: Intact to LT/PP/Temp/Vibration/Position b/l throughout.     Cortical: Extinction on DSS (neglect): none    Reflexes:              Biceps(C5)       BR(C6)     Triceps(C7)               Patellar(L4)    Achilles(S1)    Plantar Resp  R	2	          2	             2		        2		    2		Down   L	2	          2	             2		        2		    2		Down     Coordination: intact rapid-alt movements. No dysmetria to FTN/HTS    Gait: Normal Romberg. No postural instability. Normal stance and tandem gait.     LABORATORY:  CBC   Chem       LFTs   Coagulopathy   Lipid Panel   A1c   Cardiac enzymes     U/A   CSF  Immunological  Other    STUDIES & IMAGING:  Studies (EKG, EEG, EMG, etc):     Radiology (XR, CT, MR, U/S, TTE/ANETTE): SUBJECTIVE: Patient seen and examined at the bedside.     INTERVAL HISTORY:    PAST MEDICAL & SURGICAL HISTORY:  PAD (peripheral artery disease)    Memory loss    HTN (hypertension)    DM (diabetes mellitus)    S/P peripheral artery angioplasty with stent placement      FAMILY HISTORY:  No pertinent family history in first degree relatives      SOCIAL HISTORY:   T/E/D:   Occupation:   Lives with:     MEDICATIONS (HOME):  Home Medications:  amLODIPine 5 mg oral tablet: 1 tab(s) orally once a day (12 Jan 2021 03:03)  aspirin 81 mg oral tablet, chewable: 1 tab(s) orally once a day (12 Jan 2021 03:03)  donepezil 10 mg oral tablet: 1 tab(s) orally once a day (at bedtime) (12 Jan 2021 03:03)  gabapentin 100 mg oral capsule: 1 cap(s) orally 3 times a day (12 Jan 2021 03:02)  glipiZIDE 10 mg oral tablet: 1 tab(s) orally once a day (12 Jan 2021 03:02)  Lipitor 80 mg oral tablet: 1 tab(s) orally once a day (12 Jan 2021 03:03)  metFORMIN 1000 mg oral tablet: 1 tab(s) orally 2 times a day (12 Jan 2021 03:03)  Plavix 75 mg oral tablet: 1 tab(s) orally once a day (12 Jan 2021 03:02)  Vascepa 1 g oral capsule: 2 cap(s) orally 2 times a day (12 Jan 2021 03:04)    MEDICATIONS  (STANDING):  amLODIPine   Tablet 5 milliGRAM(s) Oral daily  aspirin  chewable 81 milliGRAM(s) Oral daily  atorvastatin 80 milliGRAM(s) Oral at bedtime  clopidogrel Tablet 75 milliGRAM(s) Oral daily  dextrose 40% Gel 15 Gram(s) Oral once  dextrose 5%. 1000 milliLiter(s) (50 mL/Hr) IV Continuous <Continuous>  dextrose 5%. 1000 milliLiter(s) (100 mL/Hr) IV Continuous <Continuous>  dextrose 50% Injectable 25 Gram(s) IV Push once  dextrose 50% Injectable 12.5 Gram(s) IV Push once  dextrose 50% Injectable 25 Gram(s) IV Push once  donepezil 10 milliGRAM(s) Oral at bedtime  enoxaparin Injectable 40 milliGRAM(s) SubCutaneous at bedtime  gabapentin 100 milliGRAM(s) Oral three times a day  glucagon  Injectable 1 milliGRAM(s) IntraMuscular once  insulin lispro (ADMELOG) corrective regimen sliding scale   SubCutaneous three times a day before meals  insulin lispro (ADMELOG) corrective regimen sliding scale   SubCutaneous at bedtime  methylPREDNISolone sodium succinate IVPB 1000 milliGRAM(s) IV Intermittent daily  pantoprazole    Tablet 40 milliGRAM(s) Oral before breakfast    MEDICATIONS  (PRN):    ALLERGIES/INTOLERANCES:  Allergies  No Known Allergies    Intolerances    VITALS & EXAMINATION:  Vital Signs Last 24 Hrs  T(C): 36.4 (15 Star 2021 04:45), Max: 36.8 (14 Jan 2021 19:52)  T(F): 97.5 (15 Star 2021 04:45), Max: 98.2 (14 Jan 2021 19:52)  HR: 79 (15 Star 2021 04:45) (64 - 79)  BP: 113/67 (15 Star 2021 04:45) (105/62 - 121/74)  BP(mean): --  RR: 18 (15 Star 2021 04:45) (18 - 19)  SpO2: 98% (15 Star 2021 04:45) (98% - 99%)    General:  Constitutional: Obese Female, appears stated age, in no apparent distress including pain    Neurological (>12):  MS: Awake, alert, oriented to person, place, situation, time. Normal affect. Follows all commands.    Language: Speech is clear, fluent with good comprehension     CNs: EOMI. No facial asymmetry b/l. Head turning intact b/l    Fundoscopic: deferred    Motor: Normal muscle bulk & tone. No noticeable tremor or seizure.               Deltoid	Biceps	Triceps	Wrist	Finger ABd	   R	5	5	5	5	5		5 	  L	5	5	5	5	5		5    	H-Flex	H-Ext	K-Flex	K-Ext	D-Flex	P-Flex  R	4+	5	4	4+	4	4+			   L	3	4+	4-	4	4+	5			     Sensation: Intact to LT b/l throughout.       Reflexes:              Biceps(C5)       BR(C6)     Triceps(C7)               Patellar(L4)    Achilles(S1)    Plantar Resp  R	2	          2	             2		        2		    0		  L	2	          2	             2		        1		    0		    Coordination: deferred    Gait: deferred    LABORATORY:  CBC   Chem       LFTs   Coagulopathy   Lipid Panel   A1c   Cardiac enzymes     U/A   CSF  Immunological  Other    STUDIES & IMAGING:  Studies (EKG, EEG, EMG, etc):     Radiology (XR, CT, MR, U/S, TTE/ANETTE):

## 2021-01-15 NOTE — PROGRESS NOTE ADULT - SUBJECTIVE AND OBJECTIVE BOX
Patient is a 65y old  Female who presents with a chief complaint of inability to ambulate (13 Jan 2021 07:27)    Patient without complaints.  No CP, no SOB, no N/V.  Tolerating therapies.    MEDICATIONS  (STANDING):  amLODIPine   Tablet 5 milliGRAM(s) Oral daily  aspirin  chewable 81 milliGRAM(s) Oral daily  atorvastatin 80 milliGRAM(s) Oral at bedtime  clopidogrel Tablet 75 milliGRAM(s) Oral daily  dextrose 40% Gel 15 Gram(s) Oral once  dextrose 5%. 1000 milliLiter(s) (50 mL/Hr) IV Continuous <Continuous>  dextrose 5%. 1000 milliLiter(s) (100 mL/Hr) IV Continuous <Continuous>  dextrose 50% Injectable 25 Gram(s) IV Push once  dextrose 50% Injectable 12.5 Gram(s) IV Push once  dextrose 50% Injectable 25 Gram(s) IV Push once  donepezil 10 milliGRAM(s) Oral at bedtime  enoxaparin Injectable 40 milliGRAM(s) SubCutaneous at bedtime  gabapentin 100 milliGRAM(s) Oral three times a day  glucagon  Injectable 1 milliGRAM(s) IntraMuscular once  insulin lispro (ADMELOG) corrective regimen sliding scale   SubCutaneous three times a day before meals  insulin lispro (ADMELOG) corrective regimen sliding scale   SubCutaneous at bedtime  methylPREDNISolone sodium succinate IVPB 1000 milliGRAM(s) IV Intermittent daily  pantoprazole    Tablet 40 milliGRAM(s) Oral before breakfast    MEDICATIONS  (PRN):    Vital Signs Last 24 Hrs  T(C): 37.2 (15 Star 2021 13:59), Max: 37.2 (15 Star 2021 13:59)  T(F): 98.9 (15 Star 2021 13:59), Max: 98.9 (15 Star 2021 13:59)  HR: 72 (15 Star 2021 13:59) (64 - 79)  BP: 130/72 (15 Star 2021 13:59) (105/62 - 130/72)  BP(mean): --  RR: 18 (15 Star 2021 13:59) (18 - 19)  SpO2: 98% (15 Star 2021 13:59) (95% - 99%)    PHYSICAL EXAM  Constitutional - NAD, Comfortable  HEENT - NCAT, EOMI  Neck - Supple, No limited ROM  Extremities - No C/C/E, No calf tenderness   Neurologic Exam -                   AAO x 3    Speech clear/fluent/intelligible    Motor 4/5 RLE, 4+/5 LLE, 5/5 bl UE    Sens intact    No clonus  Psychiatric - Mood stable, Affect WNL    Impression:  64 yo with functional deficits secondary to diagnosis of lower extremity weakness secondary to transverse myelitis.  Plan:  PT- ROM, Bed Mob, Transfers, Amb w AD and bracing as needed  OT- ADLs, bracing  Prec- Falls, Cardiac  DVT Prophylaxis- SCDs, Lovenox  Skin- Turn q2 h  Dispo- Acute Rehab- can tolerate 3h/d of therapies and requires daily physician visits

## 2021-01-15 NOTE — PROGRESS NOTE ADULT - ASSESSMENT
65 year old RH Urdu speaking female with a past medical history of HTN, DM, HLD, peripheral artery stents, memory loss who presents with inability to ambulate and lower extremity weakness.    Impression: Lower extremity weakness RLE>LLE with inability to ambulate and MR concerning for an inflammatory vs demyelinating vs ischemic lesion. Concern for transverse myelitis at this time, given CSF pleocytosis and elevated protein with clinical presentation of weakness and sensory changes and MR findings of abnormal signal at T10 spinal cord. Clinical course of improvement also befitting of transverse myelitis. Less likely ischemic etiology given improvement of symptoms clinically and subjectively and not following natural course of stroke. Less likely infectious etiology given normal WBC count, non-infectious appearance, negative Gram stain and PCR results on CSF.     Plan:  LE weakness and inability to ambulate, suspected from TM  [] Started 1 g IV methylprednisolone for 3-5 day course depending on improvement, GI prophylaxis with protonix 40 mg daily, FS monitoring  [] f/u B12, Vit E, copper, folate, TSH, RENNY, ANCA, Sjogrens Ab, RF, ACE  [] PT/OT, reassess ambulation given improvement on clinical exam    DM  [] BGM and ISS    HTN  [] continue home amlodipine    PAD  [] aspirin and Plavix    HLD  [] continue home Lipitor    Case to be discussed with Dr. Lucero 65 year old RH Divehi speaking female with a past medical history of HTN, DM, HLD, peripheral artery stents, memory loss who presents with inability to ambulate and lower extremity weakness.    Impression: Lower extremity weakness RLE>LLE with inability to ambulate and MR concerning for an inflammatory vs demyelinating vs ischemic lesion. Concern for transverse myelitis at this time, given CSF pleocytosis and elevated protein with clinical presentation of weakness and sensory changes and MR findings of abnormal signal at T10 spinal cord. Clinical course of improvement also befitting of transverse myelitis. Less likely ischemic etiology given improvement of symptoms clinically and subjectively and not following natural course of stroke. Less likely infectious etiology given normal WBC count, non-infectious appearance, negative Gram stain and PCR results on CSF.     Plan:  LE weakness and inability to ambulate, suspected from TM  [] Started 1 g IV methylprednisolone for 3 day course, GI prophylaxis with protonix 40 mg daily, FS monitoring  [] f/u B12, Vit E, copper, folate, TSH, RENNY, ANCA, Sjogrens Ab, RF, ACE  [] PT/OT, reassess ambulation given improvement on clinical exam    DM  [] BGM and ISS    HTN  [] continue home amlodipine    PAD  [] aspirin and Plavix    HLD  [] continue home Lipitor    Case discussed with Dr. Lucero

## 2021-01-16 LAB
ALBUMIN SERPL ELPH-MCNC: 3.6 G/DL — SIGNIFICANT CHANGE UP (ref 3.3–5)
ALP SERPL-CCNC: 120 U/L — SIGNIFICANT CHANGE UP (ref 40–120)
ALT FLD-CCNC: 16 U/L — SIGNIFICANT CHANGE UP (ref 10–45)
ANION GAP SERPL CALC-SCNC: 13 MMOL/L — SIGNIFICANT CHANGE UP (ref 5–17)
AST SERPL-CCNC: 13 U/L — SIGNIFICANT CHANGE UP (ref 10–40)
BILIRUB SERPL-MCNC: 0.3 MG/DL — SIGNIFICANT CHANGE UP (ref 0.2–1.2)
BUN SERPL-MCNC: 25 MG/DL — HIGH (ref 7–23)
CALCIUM SERPL-MCNC: 9.4 MG/DL — SIGNIFICANT CHANGE UP (ref 8.4–10.5)
CHLORIDE SERPL-SCNC: 104 MMOL/L — SIGNIFICANT CHANGE UP (ref 96–108)
CO2 SERPL-SCNC: 19 MMOL/L — LOW (ref 22–31)
COPPER SERPL-MCNC: 134 UG/DL — SIGNIFICANT CHANGE UP (ref 80–158)
CREAT SERPL-MCNC: 0.75 MG/DL — SIGNIFICANT CHANGE UP (ref 0.5–1.3)
CULTURE RESULTS: SIGNIFICANT CHANGE UP
GLUCOSE BLDC GLUCOMTR-MCNC: 262 MG/DL — HIGH (ref 70–99)
GLUCOSE BLDC GLUCOMTR-MCNC: 382 MG/DL — HIGH (ref 70–99)
GLUCOSE BLDC GLUCOMTR-MCNC: 437 MG/DL — HIGH (ref 70–99)
GLUCOSE BLDC GLUCOMTR-MCNC: 461 MG/DL — CRITICAL HIGH (ref 70–99)
GLUCOSE SERPL-MCNC: 265 MG/DL — HIGH (ref 70–99)
HCT VFR BLD CALC: 39.7 % — SIGNIFICANT CHANGE UP (ref 34.5–45)
HGB BLD-MCNC: 13.2 G/DL — SIGNIFICANT CHANGE UP (ref 11.5–15.5)
MCHC RBC-ENTMCNC: 30.8 PG — SIGNIFICANT CHANGE UP (ref 27–34)
MCHC RBC-ENTMCNC: 33.2 GM/DL — SIGNIFICANT CHANGE UP (ref 32–36)
MCV RBC AUTO: 92.8 FL — SIGNIFICANT CHANGE UP (ref 80–100)
NRBC # BLD: 0 /100 WBCS — SIGNIFICANT CHANGE UP (ref 0–0)
PLATELET # BLD AUTO: 272 K/UL — SIGNIFICANT CHANGE UP (ref 150–400)
POTASSIUM SERPL-MCNC: 3.8 MMOL/L — SIGNIFICANT CHANGE UP (ref 3.5–5.3)
POTASSIUM SERPL-SCNC: 3.8 MMOL/L — SIGNIFICANT CHANGE UP (ref 3.5–5.3)
PROT SERPL-MCNC: 6.8 G/DL — SIGNIFICANT CHANGE UP (ref 6–8.3)
RBC # BLD: 4.28 M/UL — SIGNIFICANT CHANGE UP (ref 3.8–5.2)
RBC # FLD: 13.3 % — SIGNIFICANT CHANGE UP (ref 10.3–14.5)
SODIUM SERPL-SCNC: 136 MMOL/L — SIGNIFICANT CHANGE UP (ref 135–145)
SPECIMEN SOURCE: SIGNIFICANT CHANGE UP
WBC # BLD: 12.68 K/UL — HIGH (ref 3.8–10.5)
WBC # FLD AUTO: 12.68 K/UL — HIGH (ref 3.8–10.5)

## 2021-01-16 PROCEDURE — 99232 SBSQ HOSP IP/OBS MODERATE 35: CPT | Mod: GC

## 2021-01-16 RX ORDER — INSULIN LISPRO 100/ML
5 VIAL (ML) SUBCUTANEOUS ONCE
Refills: 0 | Status: COMPLETED | OUTPATIENT
Start: 2021-01-16 | End: 2021-01-16

## 2021-01-16 RX ADMIN — ENOXAPARIN SODIUM 40 MILLIGRAM(S): 100 INJECTION SUBCUTANEOUS at 21:18

## 2021-01-16 RX ADMIN — GABAPENTIN 100 MILLIGRAM(S): 400 CAPSULE ORAL at 12:51

## 2021-01-16 RX ADMIN — CLOPIDOGREL BISULFATE 75 MILLIGRAM(S): 75 TABLET, FILM COATED ORAL at 12:51

## 2021-01-16 RX ADMIN — PANTOPRAZOLE SODIUM 40 MILLIGRAM(S): 20 TABLET, DELAYED RELEASE ORAL at 05:38

## 2021-01-16 RX ADMIN — Medication 81 MILLIGRAM(S): at 12:51

## 2021-01-16 RX ADMIN — Medication 5: at 12:54

## 2021-01-16 RX ADMIN — AMLODIPINE BESYLATE 5 MILLIGRAM(S): 2.5 TABLET ORAL at 05:35

## 2021-01-16 RX ADMIN — DONEPEZIL HYDROCHLORIDE 10 MILLIGRAM(S): 10 TABLET, FILM COATED ORAL at 21:18

## 2021-01-16 RX ADMIN — GABAPENTIN 100 MILLIGRAM(S): 400 CAPSULE ORAL at 05:35

## 2021-01-16 RX ADMIN — Medication 58 MILLIGRAM(S): at 05:35

## 2021-01-16 RX ADMIN — Medication 5 UNIT(S): at 21:19

## 2021-01-16 RX ADMIN — ATORVASTATIN CALCIUM 80 MILLIGRAM(S): 80 TABLET, FILM COATED ORAL at 21:18

## 2021-01-16 RX ADMIN — Medication 3: at 09:15

## 2021-01-16 RX ADMIN — Medication 6: at 17:10

## 2021-01-16 RX ADMIN — GABAPENTIN 100 MILLIGRAM(S): 400 CAPSULE ORAL at 21:18

## 2021-01-16 NOTE — PROGRESS NOTE ADULT - ASSESSMENT
65 year old RH Pashto speaking female with a past medical history of HTN, DM, HLD, peripheral artery stents, memory loss who presents with inability to ambulate and lower extremity weakness.    Impression: Lower extremity weakness RLE>LLE with inability to ambulate and MR concerning for an inflammatory vs demyelinating vs ischemic lesion. Concern for transverse myelitis at this time, given CSF pleocytosis and elevated protein with clinical presentation of weakness and sensory changes and MR findings of abnormal signal at T10 spinal cord. Clinical course of improvement also befitting of transverse myelitis. Less likely ischemic etiology given improvement of symptoms clinically and subjectively and not following natural course of stroke. Less likely infectious etiology given normal WBC count, non-infectious appearance, negative Gram stain and PCR results on CSF.     Plan:  LE weakness and inability to ambulate, suspected from TM, RLE weaker than LLE, RUE and RLE reflexes brisk vs left, RLE decreased LT  [] Started 1 g IV methylprednisolone for 3 day course, GI prophylaxis with protonix 40 mg daily, FS monitoring.  Sunday day 3/3.  [] f/u B12, Vit E, copper, folate, TSH, RENNY, ANCA, Sjogrens Ab, RF, ACE  [x] PT/OT recd [facility]    DM  [] BGM and ISS    HTN  [] continue home amlodipine    PAD  [] aspirin and Plavix    HLD  [] continue home Lipitor    Case discussed with Dr. Lucero

## 2021-01-16 NOTE — PROGRESS NOTE ADULT - SUBJECTIVE AND OBJECTIVE BOX
SUBJECTIVE: Patient seen and examined at the bedside.     INTERVAL HISTORY:    PAST MEDICAL & SURGICAL HISTORY:  PAD (peripheral artery disease)    Memory loss    HTN (hypertension)    DM (diabetes mellitus)    S/P peripheral artery angioplasty with stent placement      FAMILY HISTORY:  No pertinent family history in first degree relatives      SOCIAL HISTORY:   T/E/D:   Occupation:   Lives with:     MEDICATIONS (HOME):  Home Medications:  amLODIPine 5 mg oral tablet: 1 tab(s) orally once a day (12 Jan 2021 03:03)  aspirin 81 mg oral tablet, chewable: 1 tab(s) orally once a day (12 Jan 2021 03:03)  donepezil 10 mg oral tablet: 1 tab(s) orally once a day (at bedtime) (12 Jan 2021 03:03)  gabapentin 100 mg oral capsule: 1 cap(s) orally 3 times a day (12 Jan 2021 03:02)  glipiZIDE 10 mg oral tablet: 1 tab(s) orally once a day (12 Jan 2021 03:02)  Lipitor 80 mg oral tablet: 1 tab(s) orally once a day (12 Jan 2021 03:03)  metFORMIN 1000 mg oral tablet: 1 tab(s) orally 2 times a day (12 Jan 2021 03:03)  Plavix 75 mg oral tablet: 1 tab(s) orally once a day (12 Jan 2021 03:02)  Vascepa 1 g oral capsule: 2 cap(s) orally 2 times a day (12 Jan 2021 03:04)    MEDICATIONS  (STANDING):  amLODIPine   Tablet 5 milliGRAM(s) Oral daily  aspirin  chewable 81 milliGRAM(s) Oral daily  atorvastatin 80 milliGRAM(s) Oral at bedtime  clopidogrel Tablet 75 milliGRAM(s) Oral daily  dextrose 40% Gel 15 Gram(s) Oral once  dextrose 5%. 1000 milliLiter(s) (50 mL/Hr) IV Continuous <Continuous>  dextrose 5%. 1000 milliLiter(s) (100 mL/Hr) IV Continuous <Continuous>  dextrose 50% Injectable 25 Gram(s) IV Push once  dextrose 50% Injectable 12.5 Gram(s) IV Push once  dextrose 50% Injectable 25 Gram(s) IV Push once  donepezil 10 milliGRAM(s) Oral at bedtime  enoxaparin Injectable 40 milliGRAM(s) SubCutaneous at bedtime  gabapentin 100 milliGRAM(s) Oral three times a day  glucagon  Injectable 1 milliGRAM(s) IntraMuscular once  insulin lispro (ADMELOG) corrective regimen sliding scale   SubCutaneous three times a day before meals  insulin lispro (ADMELOG) corrective regimen sliding scale   SubCutaneous at bedtime  methylPREDNISolone sodium succinate IVPB 1000 milliGRAM(s) IV Intermittent daily  pantoprazole    Tablet 40 milliGRAM(s) Oral before breakfast    MEDICATIONS  (PRN):    ALLERGIES/INTOLERANCES:  Allergies  No Known Allergies    Intolerances    General:  Constitutional: Obese Female, appears stated age, in no apparent distress including pain    Neurological (>12):  MS: Awake, alert, oriented to person, place, situation, time. Normal affect. Follows all commands.    Language: Speech is clear, fluent with good comprehension     CNs: EOMI. No facial asymmetry b/l. Head turning intact b/l    Fundoscopic: deferred    Motor:   UE: no drift  LE: LLE: drift, RLE some effort against gravity	    Sensory: LT decreased RLE    DTR: RUE 3+ bi, tri, br, RLE patella 3+, achilles 1+, LUE: 2+ bi, tri, br, LLE 2+ patella and achilles    Coordination: deferred    Gait: deferred                          13.2   12.68 )-----------( 272      ( 16 Jan 2021 06:52 )             39.7     01-16    136  |  104  |  25<H>  ----------------------------<  265<H>  3.8   |  19<L>  |  0.75    Ca    9.4      16 Jan 2021 06:52    TPro  6.8  /  Alb  3.6  /  TBili  0.3  /  DBili  x   /  AST  13  /  ALT  16  /  AlkPhos  120  01-16    CAPILLARY BLOOD GLUCOSE      POCT Blood Glucose.: 262 mg/dL (16 Jan 2021 08:40)  POCT Blood Glucose.: 426 mg/dL (15 Star 2021 21:40)  POCT Blood Glucose.: 415 mg/dL (15 Star 2021 21:39)  POCT Blood Glucose.: 280 mg/dL (15 Star 2021 17:38)  POCT Blood Glucose.: 265 mg/dL (15 Star 2021 12:10)        I&O's Summary    15 Star 2021 07:01  -  16 Jan 2021 07:00  --------------------------------------------------------  IN: 890 mL / OUT: 0 mL / NET: 890 mL      Vital Signs Last 24 Hrs  T(C): 36.8 (16 Jan 2021 08:20), Max: 37.2 (15 Star 2021 13:59)  T(F): 98.2 (16 Jan 2021 08:20), Max: 98.9 (15 Star 2021 13:59)  HR: 69 (16 Jan 2021 08:20) (64 - 80)  BP: 125/69 (16 Jan 2021 08:20) (102/75 - 130/72)  BP(mean): --  RR: 18 (16 Jan 2021 08:20) (18 - 19)  SpO2: 97% (16 Jan 2021 08:20) (96% - 98%)

## 2021-01-17 LAB
ALBUMIN SERPL ELPH-MCNC: 3.5 G/DL — SIGNIFICANT CHANGE UP (ref 3.3–5)
ALP SERPL-CCNC: 105 U/L — SIGNIFICANT CHANGE UP (ref 40–120)
ALT FLD-CCNC: 16 U/L — SIGNIFICANT CHANGE UP (ref 10–45)
ANION GAP SERPL CALC-SCNC: 11 MMOL/L — SIGNIFICANT CHANGE UP (ref 5–17)
AST SERPL-CCNC: 10 U/L — SIGNIFICANT CHANGE UP (ref 10–40)
BILIRUB SERPL-MCNC: 0.2 MG/DL — SIGNIFICANT CHANGE UP (ref 0.2–1.2)
BUN SERPL-MCNC: 25 MG/DL — HIGH (ref 7–23)
CALCIUM SERPL-MCNC: 9.6 MG/DL — SIGNIFICANT CHANGE UP (ref 8.4–10.5)
CHLORIDE SERPL-SCNC: 105 MMOL/L — SIGNIFICANT CHANGE UP (ref 96–108)
CO2 SERPL-SCNC: 21 MMOL/L — LOW (ref 22–31)
CREAT SERPL-MCNC: 0.74 MG/DL — SIGNIFICANT CHANGE UP (ref 0.5–1.3)
GLUCOSE BLDC GLUCOMTR-MCNC: 218 MG/DL — HIGH (ref 70–99)
GLUCOSE BLDC GLUCOMTR-MCNC: 287 MG/DL — HIGH (ref 70–99)
GLUCOSE BLDC GLUCOMTR-MCNC: 351 MG/DL — HIGH (ref 70–99)
GLUCOSE BLDC GLUCOMTR-MCNC: 443 MG/DL — HIGH (ref 70–99)
GLUCOSE BLDC GLUCOMTR-MCNC: 474 MG/DL — CRITICAL HIGH (ref 70–99)
GLUCOSE SERPL-MCNC: 256 MG/DL — HIGH (ref 70–99)
HCT VFR BLD CALC: 34.6 % — SIGNIFICANT CHANGE UP (ref 34.5–45)
HGB BLD-MCNC: 11.5 G/DL — SIGNIFICANT CHANGE UP (ref 11.5–15.5)
MCHC RBC-ENTMCNC: 30.7 PG — SIGNIFICANT CHANGE UP (ref 27–34)
MCHC RBC-ENTMCNC: 33.2 GM/DL — SIGNIFICANT CHANGE UP (ref 32–36)
MCV RBC AUTO: 92.5 FL — SIGNIFICANT CHANGE UP (ref 80–100)
NRBC # BLD: 0 /100 WBCS — SIGNIFICANT CHANGE UP (ref 0–0)
PLATELET # BLD AUTO: 270 K/UL — SIGNIFICANT CHANGE UP (ref 150–400)
POTASSIUM SERPL-MCNC: 3.9 MMOL/L — SIGNIFICANT CHANGE UP (ref 3.5–5.3)
POTASSIUM SERPL-SCNC: 3.9 MMOL/L — SIGNIFICANT CHANGE UP (ref 3.5–5.3)
PROT SERPL-MCNC: 6.2 G/DL — SIGNIFICANT CHANGE UP (ref 6–8.3)
RBC # BLD: 3.74 M/UL — LOW (ref 3.8–5.2)
RBC # FLD: 13.4 % — SIGNIFICANT CHANGE UP (ref 10.3–14.5)
SODIUM SERPL-SCNC: 137 MMOL/L — SIGNIFICANT CHANGE UP (ref 135–145)
WBC # BLD: 15.49 K/UL — HIGH (ref 3.8–10.5)
WBC # FLD AUTO: 15.49 K/UL — HIGH (ref 3.8–10.5)

## 2021-01-17 PROCEDURE — 99232 SBSQ HOSP IP/OBS MODERATE 35: CPT | Mod: GC

## 2021-01-17 RX ORDER — INSULIN LISPRO 100/ML
VIAL (ML) SUBCUTANEOUS
Refills: 0 | Status: DISCONTINUED | OUTPATIENT
Start: 2021-01-17 | End: 2021-01-19

## 2021-01-17 RX ORDER — INSULIN LISPRO 100/ML
VIAL (ML) SUBCUTANEOUS AT BEDTIME
Refills: 0 | Status: DISCONTINUED | OUTPATIENT
Start: 2021-01-17 | End: 2021-01-19

## 2021-01-17 RX ADMIN — DONEPEZIL HYDROCHLORIDE 10 MILLIGRAM(S): 10 TABLET, FILM COATED ORAL at 21:30

## 2021-01-17 RX ADMIN — Medication 6: at 22:19

## 2021-01-17 RX ADMIN — CLOPIDOGREL BISULFATE 75 MILLIGRAM(S): 75 TABLET, FILM COATED ORAL at 12:58

## 2021-01-17 RX ADMIN — Medication 1: at 01:31

## 2021-01-17 RX ADMIN — GABAPENTIN 100 MILLIGRAM(S): 400 CAPSULE ORAL at 05:54

## 2021-01-17 RX ADMIN — PANTOPRAZOLE SODIUM 40 MILLIGRAM(S): 20 TABLET, DELAYED RELEASE ORAL at 05:54

## 2021-01-17 RX ADMIN — GABAPENTIN 100 MILLIGRAM(S): 400 CAPSULE ORAL at 21:30

## 2021-01-17 RX ADMIN — Medication 6: at 12:58

## 2021-01-17 RX ADMIN — AMLODIPINE BESYLATE 5 MILLIGRAM(S): 2.5 TABLET ORAL at 05:54

## 2021-01-17 RX ADMIN — GABAPENTIN 100 MILLIGRAM(S): 400 CAPSULE ORAL at 12:58

## 2021-01-17 RX ADMIN — Medication 24: at 17:58

## 2021-01-17 RX ADMIN — Medication 58 MILLIGRAM(S): at 05:54

## 2021-01-17 RX ADMIN — ENOXAPARIN SODIUM 40 MILLIGRAM(S): 100 INJECTION SUBCUTANEOUS at 21:30

## 2021-01-17 RX ADMIN — Medication 2: at 09:04

## 2021-01-17 RX ADMIN — ATORVASTATIN CALCIUM 80 MILLIGRAM(S): 80 TABLET, FILM COATED ORAL at 21:30

## 2021-01-17 RX ADMIN — Medication 81 MILLIGRAM(S): at 12:58

## 2021-01-17 NOTE — PROGRESS NOTE ADULT - SUBJECTIVE AND OBJECTIVE BOX
SUBJECTIVE:     Vital Signs Last 24 Hrs  T(C): 36.5 (17 Jan 2021 07:16), Max: 36.8 (16 Jan 2021 08:20)  T(F): 97.7 (17 Jan 2021 07:16), Max: 98.3 (16 Jan 2021 12:37)  HR: 67 (17 Jan 2021 07:16) (62 - 96)  BP: 123/75 (17 Jan 2021 07:16) (105/63 - 127/60)  BP(mean): --  RR: 16 (17 Jan 2021 07:16) (16 - 18)  SpO2: 96% (17 Jan 2021 07:16) (96% - 98%)        Neurological (>12):  MS: Awake, alert, oriented to person, place, situation, time. Normal affect. Follows all commands.  Language: Speech is clear, fluent with good comprehension   CNs: EOMI. No facial asymmetry b/l.   Motor: UE: no drift, LE: LLE: drift, RLE some effort against gravity	  Sensory: LT decreased RLE  DTR: RUE 3+ bi, tri, br, RLE patella 3+, achilles 1+, LUE: 2+ bi, tri, br, LLE 2+ patella and achilles        LABORATORY:  CBC                       11.5   15.49 )-----------( 270      ( 17 Jan 2021 06:17 )             34.6     Chem 01-17    137  |  105  |  25<H>  ----------------------------<  256<H>  3.9   |  21<L>  |  0.74    Ca    9.6      17 Jan 2021 06:17    TPro  6.2  /  Alb  3.5  /  TBili  0.2  /  DBili  x   /  AST  10  /  ALT  16  /  AlkPhos  105  01-17    LFTs LIVER FUNCTIONS - ( 17 Jan 2021 06:17 )  Alb: 3.5 g/dL / Pro: 6.2 g/dL / ALK PHOS: 105 U/L / ALT: 16 U/L / AST: 10 U/L / GGT: x             MEDICATIONS  (STANDING):  amLODIPine   Tablet 5 milliGRAM(s) Oral daily  aspirin  chewable 81 milliGRAM(s) Oral daily  atorvastatin 80 milliGRAM(s) Oral at bedtime  clopidogrel Tablet 75 milliGRAM(s) Oral daily  dextrose 40% Gel 15 Gram(s) Oral once  dextrose 5%. 1000 milliLiter(s) (50 mL/Hr) IV Continuous <Continuous>  dextrose 5%. 1000 milliLiter(s) (100 mL/Hr) IV Continuous <Continuous>  dextrose 50% Injectable 25 Gram(s) IV Push once  dextrose 50% Injectable 12.5 Gram(s) IV Push once  dextrose 50% Injectable 25 Gram(s) IV Push once  donepezil 10 milliGRAM(s) Oral at bedtime  enoxaparin Injectable 40 milliGRAM(s) SubCutaneous at bedtime  gabapentin 100 milliGRAM(s) Oral three times a day  glucagon  Injectable 1 milliGRAM(s) IntraMuscular once  insulin lispro (ADMELOG) corrective regimen sliding scale   SubCutaneous three times a day before meals  insulin lispro (ADMELOG) corrective regimen sliding scale   SubCutaneous at bedtime  methylPREDNISolone sodium succinate IVPB 1000 milliGRAM(s) IV Intermittent daily  pantoprazole    Tablet 40 milliGRAM(s) Oral before breakfast    MEDICATIONS  (PRN):       SUBJECTIVE: No overnight events. Pending discharge to rehab.     Vital Signs Last 24 Hrs  T(C): 36.5 (17 Jan 2021 07:16), Max: 36.8 (16 Jan 2021 08:20)  T(F): 97.7 (17 Jan 2021 07:16), Max: 98.3 (16 Jan 2021 12:37)  HR: 67 (17 Jan 2021 07:16) (62 - 96)  BP: 123/75 (17 Jan 2021 07:16) (105/63 - 127/60)  BP(mean): --  RR: 16 (17 Jan 2021 07:16) (16 - 18)  SpO2: 96% (17 Jan 2021 07:16) (96% - 98%)        Neurological (>12):  MS: Awake, alert, oriented to person, place, situation, time. Normal affect. Follows all commands.  Language: Speech is clear, fluent with good comprehension   CNs: EOMI. No facial asymmetry b/l.   Motor: UE no drift, grossly 4+/5, LLE 4+/5, RLE 4/5 distally and 3/5 proximally   Sensory: intact to LT throughout   DTR: RUE 3+ bi, tri, br, RLE patella 3+, achilles 1+, LUE: 2+ bi, tri, br, LLE 2+ patella and achilles        LABORATORY:  CBC                       11.5   15.49 )-----------( 270      ( 17 Jan 2021 06:17 )             34.6     Chem 01-17    137  |  105  |  25<H>  ----------------------------<  256<H>  3.9   |  21<L>  |  0.74    Ca    9.6      17 Jan 2021 06:17    TPro  6.2  /  Alb  3.5  /  TBili  0.2  /  DBili  x   /  AST  10  /  ALT  16  /  AlkPhos  105  01-17    LFTs LIVER FUNCTIONS - ( 17 Jan 2021 06:17 )  Alb: 3.5 g/dL / Pro: 6.2 g/dL / ALK PHOS: 105 U/L / ALT: 16 U/L / AST: 10 U/L / GGT: x             MEDICATIONS  (STANDING):  amLODIPine   Tablet 5 milliGRAM(s) Oral daily  aspirin  chewable 81 milliGRAM(s) Oral daily  atorvastatin 80 milliGRAM(s) Oral at bedtime  clopidogrel Tablet 75 milliGRAM(s) Oral daily  dextrose 40% Gel 15 Gram(s) Oral once  dextrose 5%. 1000 milliLiter(s) (50 mL/Hr) IV Continuous <Continuous>  dextrose 5%. 1000 milliLiter(s) (100 mL/Hr) IV Continuous <Continuous>  dextrose 50% Injectable 25 Gram(s) IV Push once  dextrose 50% Injectable 12.5 Gram(s) IV Push once  dextrose 50% Injectable 25 Gram(s) IV Push once  donepezil 10 milliGRAM(s) Oral at bedtime  enoxaparin Injectable 40 milliGRAM(s) SubCutaneous at bedtime  gabapentin 100 milliGRAM(s) Oral three times a day  glucagon  Injectable 1 milliGRAM(s) IntraMuscular once  insulin lispro (ADMELOG) corrective regimen sliding scale   SubCutaneous three times a day before meals  insulin lispro (ADMELOG) corrective regimen sliding scale   SubCutaneous at bedtime  methylPREDNISolone sodium succinate IVPB 1000 milliGRAM(s) IV Intermittent daily  pantoprazole    Tablet 40 milliGRAM(s) Oral before breakfast    MEDICATIONS  (PRN):       SUBJECTIVE: No overnight events. Pending discharge to rehab.     Vital Signs Last 24 Hrs  T(C): 36.5 (17 Jan 2021 07:16), Max: 36.8 (16 Jan 2021 08:20)  T(F): 97.7 (17 Jan 2021 07:16), Max: 98.3 (16 Jan 2021 12:37)  HR: 67 (17 Jan 2021 07:16) (62 - 96)  BP: 123/75 (17 Jan 2021 07:16) (105/63 - 127/60)  BP(mean): --  RR: 16 (17 Jan 2021 07:16) (16 - 18)  SpO2: 96% (17 Jan 2021 07:16) (96% - 98%)        Neurological (>12):  MS: Awake, alert, oriented to person, place, situation, time. Normal affect. Follows all commands.  Language: Speech is clear, fluent with good comprehension   CNs: EOMI. No facial asymmetry b/l.   Motor: UE no drift, grossly 4+/5, LLE 5/5, RLE flexion 3/5, extension 4/5, DF and PF 2/5  Sensory: intact to LT throughout   DTR: RUE 3+ bi, tri, br, RLE patella 3+, achilles 1+, LUE: 2+ bi, tri, br, LLE 2+ patella and achilles        LABORATORY:  CBC                       11.5   15.49 )-----------( 270      ( 17 Jan 2021 06:17 )             34.6     Chem 01-17    137  |  105  |  25<H>  ----------------------------<  256<H>  3.9   |  21<L>  |  0.74    Ca    9.6      17 Jan 2021 06:17    TPro  6.2  /  Alb  3.5  /  TBili  0.2  /  DBili  x   /  AST  10  /  ALT  16  /  AlkPhos  105  01-17    LFTs LIVER FUNCTIONS - ( 17 Jan 2021 06:17 )  Alb: 3.5 g/dL / Pro: 6.2 g/dL / ALK PHOS: 105 U/L / ALT: 16 U/L / AST: 10 U/L / GGT: x             MEDICATIONS  (STANDING):  amLODIPine   Tablet 5 milliGRAM(s) Oral daily  aspirin  chewable 81 milliGRAM(s) Oral daily  atorvastatin 80 milliGRAM(s) Oral at bedtime  clopidogrel Tablet 75 milliGRAM(s) Oral daily  dextrose 40% Gel 15 Gram(s) Oral once  dextrose 5%. 1000 milliLiter(s) (50 mL/Hr) IV Continuous <Continuous>  dextrose 5%. 1000 milliLiter(s) (100 mL/Hr) IV Continuous <Continuous>  dextrose 50% Injectable 25 Gram(s) IV Push once  dextrose 50% Injectable 12.5 Gram(s) IV Push once  dextrose 50% Injectable 25 Gram(s) IV Push once  donepezil 10 milliGRAM(s) Oral at bedtime  enoxaparin Injectable 40 milliGRAM(s) SubCutaneous at bedtime  gabapentin 100 milliGRAM(s) Oral three times a day  glucagon  Injectable 1 milliGRAM(s) IntraMuscular once  insulin lispro (ADMELOG) corrective regimen sliding scale   SubCutaneous three times a day before meals  insulin lispro (ADMELOG) corrective regimen sliding scale   SubCutaneous at bedtime  methylPREDNISolone sodium succinate IVPB 1000 milliGRAM(s) IV Intermittent daily  pantoprazole    Tablet 40 milliGRAM(s) Oral before breakfast    MEDICATIONS  (PRN):

## 2021-01-17 NOTE — PROGRESS NOTE ADULT - ASSESSMENT
65 year old RH Barbadian speaking female with a past medical history of HTN, DM, HLD, peripheral artery stents, memory loss who presents with inability to ambulate and lower extremity weakness, concerning for transverse myeiltis given CSF pleocytosis and elevated protein as well abnormal signal at T10 on MRI, clinically improving with steroids.    Plan:  [] 1 g IV methylprednisolone day 3/3 today with GI ppx.  [] f/u Vit E, RENNY, ANCA  [x] PT/OT rec [facility]    DM  [] BGM and ISS    HTN  [x] continue amlodipine    PAD  [x] continue aspirin and Plavix    HLD  [] continue home Lipitor   65 year old RH English speaking female with a past medical history of HTN, DM, HLD, peripheral artery stents, memory loss who presents with inability to ambulate and lower extremity weakness, concerning for transverse myeiltis given CSF pleocytosis and elevated protein as well abnormal signal at T10 on MRI, clinically improving with steroids.    Plan:  [] 1 g IV methylprednisolone day 3/3 today with GI ppx.  [] f/u Vit E, RENNY, ANCA  [x] PT/OT rec [facility]    DM  [x] BGM and ISS    HTN  [x] continue amlodipine    PAD  [x] continue aspirin and Plavix    HLD  [x] continue home Lipitor    Dispo: D/c to [facility] once authorized by insurance and bed becomes available   65 year old RH St Helenian speaking female with a past medical history of HTN, DM, HLD, peripheral artery stents, memory loss who presents with inability to ambulate and lower extremity weakness, concerning for transverse myeiltis given CSF pleocytosis and elevated protein as well abnormal signal at T10 on MRI, clinically improving with steroids.    Plan:  [] 1 g IV methylprednisolone day 3/3 today with GI ppx. No steroid taper due to DM2  [] f/u Vit E, RENNY, ANCA, NMO Ab   [x] PT/OT rec [facility]    DM  [x] BGM and ISS    HTN  [x] continue amlodipine    PAD  [x] continue aspirin and Plavix    HLD  [x] continue home Lipitor    Dispo: D/c to [facility] once authorized by insurance and bed becomes available

## 2021-01-17 NOTE — PROVIDER CONTACT NOTE (OTHER) - ASSESSMENT
pt is alert and oriented. blood glucose prior to dinner 461. 6 unit insulin administered SQ. pt is currently given steroids IV for diagnosis.
pt is neurologically stable. glucose level 474  pt currently on IV steroids for transverse myelitis

## 2021-01-17 NOTE — PROVIDER CONTACT NOTE (OTHER) - BACKGROUND
pt admitted with inability to ambulate and lower extremity weakness
pt admitted with inability to ambulate and lower extremity weakness

## 2021-01-18 LAB
A-TOCOPHEROL VIT E SERPL-MCNC: 7.3 MG/L — LOW (ref 9–29)
ALBUMIN SERPL ELPH-MCNC: 3.4 G/DL — SIGNIFICANT CHANGE UP (ref 3.3–5)
ALP SERPL-CCNC: 103 U/L — SIGNIFICANT CHANGE UP (ref 40–120)
ALT FLD-CCNC: 18 U/L — SIGNIFICANT CHANGE UP (ref 10–45)
ANION GAP SERPL CALC-SCNC: 9 MMOL/L — SIGNIFICANT CHANGE UP (ref 5–17)
AST SERPL-CCNC: 14 U/L — SIGNIFICANT CHANGE UP (ref 10–40)
BETA+GAMMA TOCOPHEROL SERPL-MCNC: 0.4 MG/L — LOW (ref 0.5–4.9)
BILIRUB SERPL-MCNC: 0.2 MG/DL — SIGNIFICANT CHANGE UP (ref 0.2–1.2)
BUN SERPL-MCNC: 32 MG/DL — HIGH (ref 7–23)
CALCIUM SERPL-MCNC: 9.4 MG/DL — SIGNIFICANT CHANGE UP (ref 8.4–10.5)
CHLORIDE SERPL-SCNC: 106 MMOL/L — SIGNIFICANT CHANGE UP (ref 96–108)
CO2 SERPL-SCNC: 23 MMOL/L — SIGNIFICANT CHANGE UP (ref 22–31)
CREAT SERPL-MCNC: 0.87 MG/DL — SIGNIFICANT CHANGE UP (ref 0.5–1.3)
CULTURE RESULTS: SIGNIFICANT CHANGE UP
GD1A GANGL IGG+IGM SER IA-ACNC: 7 IV — SIGNIFICANT CHANGE UP (ref 0–50)
GD1B GANGL IGG+IGM SER IA-ACNC: 7 IV — SIGNIFICANT CHANGE UP (ref 0–50)
GLUCOSE BLDC GLUCOMTR-MCNC: 257 MG/DL — HIGH (ref 70–99)
GLUCOSE BLDC GLUCOMTR-MCNC: 319 MG/DL — HIGH (ref 70–99)
GLUCOSE BLDC GLUCOMTR-MCNC: 389 MG/DL — HIGH (ref 70–99)
GLUCOSE BLDC GLUCOMTR-MCNC: 399 MG/DL — HIGH (ref 70–99)
GLUCOSE SERPL-MCNC: 262 MG/DL — HIGH (ref 70–99)
GM1 ASIALO IGG+IGM SER IA-ACNC: 7 IV — SIGNIFICANT CHANGE UP (ref 0–50)
GM1 GANGL IGG+IGM SER-ACNC: 10 IV — SIGNIFICANT CHANGE UP (ref 0–50)
GM2 GANGL IGG+IGM SER IA-ACNC: 10 IV — SIGNIFICANT CHANGE UP (ref 0–50)
GQ1B GANGL IGG+IGM SER IA-ACNC: 6 IV — SIGNIFICANT CHANGE UP (ref 0–50)
HCT VFR BLD CALC: 35.5 % — SIGNIFICANT CHANGE UP (ref 34.5–45)
HGB BLD-MCNC: 11.8 G/DL — SIGNIFICANT CHANGE UP (ref 11.5–15.5)
MCHC RBC-ENTMCNC: 30.8 PG — SIGNIFICANT CHANGE UP (ref 27–34)
MCHC RBC-ENTMCNC: 33.2 GM/DL — SIGNIFICANT CHANGE UP (ref 32–36)
MCV RBC AUTO: 92.7 FL — SIGNIFICANT CHANGE UP (ref 80–100)
NRBC # BLD: 0 /100 WBCS — SIGNIFICANT CHANGE UP (ref 0–0)
PLATELET # BLD AUTO: 259 K/UL — SIGNIFICANT CHANGE UP (ref 150–400)
POTASSIUM SERPL-MCNC: 4.4 MMOL/L — SIGNIFICANT CHANGE UP (ref 3.5–5.3)
POTASSIUM SERPL-SCNC: 4.4 MMOL/L — SIGNIFICANT CHANGE UP (ref 3.5–5.3)
PROT SERPL-MCNC: 6.4 G/DL — SIGNIFICANT CHANGE UP (ref 6–8.3)
RBC # BLD: 3.83 M/UL — SIGNIFICANT CHANGE UP (ref 3.8–5.2)
RBC # FLD: 13.7 % — SIGNIFICANT CHANGE UP (ref 10.3–14.5)
SARS-COV-2 RNA SPEC QL NAA+PROBE: SIGNIFICANT CHANGE UP
SODIUM SERPL-SCNC: 138 MMOL/L — SIGNIFICANT CHANGE UP (ref 135–145)
SPECIMEN SOURCE: SIGNIFICANT CHANGE UP
WBC # BLD: 12.3 K/UL — HIGH (ref 3.8–10.5)
WBC # FLD AUTO: 12.3 K/UL — HIGH (ref 3.8–10.5)
WNV IGG CSF IA-ACNC: POSITIVE
WNV IGM CSF IA-ACNC: NEGATIVE — SIGNIFICANT CHANGE UP

## 2021-01-18 PROCEDURE — 99232 SBSQ HOSP IP/OBS MODERATE 35: CPT | Mod: GC

## 2021-01-18 RX ADMIN — Medication 4: at 22:28

## 2021-01-18 RX ADMIN — ENOXAPARIN SODIUM 40 MILLIGRAM(S): 100 INJECTION SUBCUTANEOUS at 18:52

## 2021-01-18 RX ADMIN — GABAPENTIN 100 MILLIGRAM(S): 400 CAPSULE ORAL at 21:07

## 2021-01-18 RX ADMIN — Medication 58 MILLIGRAM(S): at 04:55

## 2021-01-18 RX ADMIN — AMLODIPINE BESYLATE 5 MILLIGRAM(S): 2.5 TABLET ORAL at 04:56

## 2021-01-18 RX ADMIN — Medication 20: at 12:47

## 2021-01-18 RX ADMIN — ATORVASTATIN CALCIUM 80 MILLIGRAM(S): 80 TABLET, FILM COATED ORAL at 21:07

## 2021-01-18 RX ADMIN — Medication 12: at 08:43

## 2021-01-18 RX ADMIN — Medication 81 MILLIGRAM(S): at 12:47

## 2021-01-18 RX ADMIN — GABAPENTIN 100 MILLIGRAM(S): 400 CAPSULE ORAL at 12:47

## 2021-01-18 RX ADMIN — DONEPEZIL HYDROCHLORIDE 10 MILLIGRAM(S): 10 TABLET, FILM COATED ORAL at 21:08

## 2021-01-18 RX ADMIN — Medication 20: at 17:57

## 2021-01-18 RX ADMIN — PANTOPRAZOLE SODIUM 40 MILLIGRAM(S): 20 TABLET, DELAYED RELEASE ORAL at 08:44

## 2021-01-18 RX ADMIN — GABAPENTIN 100 MILLIGRAM(S): 400 CAPSULE ORAL at 04:55

## 2021-01-18 RX ADMIN — CLOPIDOGREL BISULFATE 75 MILLIGRAM(S): 75 TABLET, FILM COATED ORAL at 12:47

## 2021-01-18 NOTE — PROGRESS NOTE ADULT - SUBJECTIVE AND OBJECTIVE BOX
SUBJECTIVE: Patient interviewed and examined at the bedside on the morning of 1/18/21. Completed course of steroids (3/3) yesterday. No overnight events. Pending discharge to rehab.     Vital Signs Last 24 Hrs    T(C): 36.5 (18 Jan 2021 04:36), Max: 36.6 (17 Jan 2021 19:25)  T(F): 97.7 (18 Jan 2021 04:36), Max: 97.9 (17 Jan 2021 19:25)  HR: 66 (18 Jan 2021 04:36) (60 - 86)  BP: 132/80 (18 Jan 2021 04:36) (115/68 - 147/67)  RR: 18 (18 Jan 2021 04:36) (18 - 18)  SpO2: 97% (18 Jan 2021 04:36) (96% - 99%)        Neurological (>12):  MS: Awake, alert, oriented to person, place, situation, time. Normal affect. Follows all commands.  Language: Speech is clear, fluent with good comprehension   CNs: EOMI. No facial asymmetry b/l.   Motor: UE no drift, grossly 4+/5, LLE 5/5, RLE flexion 3/5, extension 4/5, DF and PF 2/5  Sensory: intact to LT throughout   DTR: RUE 3+ bi, tri, br, RLE patella 3+, achilles 1+, LUE: 2+ bi, tri, br, LLE 2+ patella and achilles        LABORATORY:                        11.8   12.30 )-----------( 259      ( 18 Jan 2021 06:18 )             35.5     01-18    138  |  106  |  32<H>  ----------------------------<  262<H>  4.4   |  23  |  0.87    Ca    9.4      18 Jan 2021 06:18    TPro  6.4  /  Alb  3.4  /  TBili  0.2  /  DBili  x   /  AST  14  /  ALT  18  /  AlkPhos  103  01-18    CAPILLARY BLOOD GLUCOSE      POCT Blood Glucose.: 351 mg/dL (17 Jan 2021 21:37)  POCT Blood Glucose.: 474 mg/dL (17 Jan 2021 17:03)  POCT Blood Glucose.: 443 mg/dL (17 Jan 2021 12:08)  POCT Blood Glucose.: 218 mg/dL (17 Jan 2021 08:22)        I&O's Summary    17 Jan 2021 07:01  -  18 Jan 2021 07:00  --------------------------------------------------------  IN: 400 mL / OUT: 800 mL / NET: -400 m        MEDICATIONS  (STANDING):  amLODIPine   Tablet 5 milliGRAM(s) Oral daily  aspirin  chewable 81 milliGRAM(s) Oral daily  atorvastatin 80 milliGRAM(s) Oral at bedtime  clopidogrel Tablet 75 milliGRAM(s) Oral daily  dextrose 40% Gel 15 Gram(s) Oral once  dextrose 5%. 1000 milliLiter(s) (50 mL/Hr) IV Continuous <Continuous>  dextrose 5%. 1000 milliLiter(s) (100 mL/Hr) IV Continuous <Continuous>  dextrose 50% Injectable 25 Gram(s) IV Push once  dextrose 50% Injectable 12.5 Gram(s) IV Push once  dextrose 50% Injectable 25 Gram(s) IV Push once  donepezil 10 milliGRAM(s) Oral at bedtime  enoxaparin Injectable 40 milliGRAM(s) SubCutaneous at bedtime  gabapentin 100 milliGRAM(s) Oral three times a day  glucagon  Injectable 1 milliGRAM(s) IntraMuscular once  insulin lispro (ADMELOG) corrective regimen sliding scale   SubCutaneous three times a day before meals  insulin lispro (ADMELOG) corrective regimen sliding scale   SubCutaneous at bedtime  methylPREDNISolone sodium succinate IVPB 1000 milliGRAM(s) IV Intermittent daily  pantoprazole    Tablet 40 milliGRAM(s) Oral before breakfast              MR Thoracic Spine w/wo IV Cont (01.12.21 @ 11:35)       INTERPRETATION:  CLINICAL INDICATION: Inability to walk, evaluate cord signal    Magnetic resonance imaging of the cervical and thoracic spine was carried out with sagittal T1-weighted series postcontrast, sagittal STIR and sagittal diffusion on a1.5 Brissa magnet. 6 cc of Gadavist were intravenously injected, 1.5 his were discarded.    Comparison is made with the prior cord compression study of 1/11/2021.    The cervical, thoracic, and lumbosacral vertebral bodies are normal in height and signal intensity. There is mild degenerative endplate change at T7-8. The signal abnormality seen within the cord at the T10 level is again identified on the sagittal STIR images. After contrast administration there is normal vascular and osseous enhancement. No abnormal cord enhancement is identified. No paraspinal masses are identified.      Diffusion-weighted imaging is nondiagnostic.    IMPRESSION: Focus of abnormal signal within the cord at T10 does not enhance with contrast. Differential diagnosis includes infectious, inflammatory and demyelinating processes.

## 2021-01-18 NOTE — PROGRESS NOTE ADULT - ATTENDING COMMENTS
NEUROLOGY ATTENDING: MUSA     s/p SOLUMEDROL x 3 DOSES  awaiting rehab bed.  d/c planning
LP did not disclose CSF. Will obtain under fluoro to confirm dry tap. Defer solumedrol until after CSF obtained.    Patient is improving neurologically, both on examination and subjectively. As such improvement is NOT expected from spinal cord infarction, will defer angiogram indefinitely for now.
NEUROLOGY ATTENDING: MUSA     NO NEW COMPLAINTS    ON EXAM, LEFT LEG HAS NORMAL STRENGTH.  RIGHT LEG  iliopsoas 3/5  hams 4/5  quads 3+/5  anterior tibialis 2/5  DTRs present bilaterally    IMPRESSION/PLAN  TRANSVERSE MYELITIS -- Please send NMO antibodies. s/p Solumedrol, no prednisone taper due to hyperglycemia risks.    DISPO -- No objection to d/c to rehab
NEUROLOGY ATTENDING: MUSA     CC: RIGHT LEG WEAKNESS WITH LESSER LEFT LEG WEAKNESS,    PATIENT SEEN AND EXAMINED WITH ROSETAFF    Briefly, 64 yo RH woman with diabetes presented to [facility] ED with complaints of bilateral LE paraparesis and numbness with MRI demonstrating abnormal signal in the spinal cord.    She reports continued improvement today.    ON EXAMINATION  AOx3 speech is fluent, nonaphasic and sensical.  follows all commands  no cranial nerve deficit  ILIOPSOAS - 3/5 R 4/5 L  QUADS - 3/5 R 5/5 L  HAMS - 3/5 R 4/5 L  GASTROCS - 4/5 R 5/5 L  ANT TIBIALIS - 3/5 R 4/5 L  sensory loss is more on the right leg. The left leg is "much better" but not normal.  DTRS - absent at bialteral ankles. 2+ at left knee 1+ at right knee 1+ in both biceps.      LABS  1/13/2021 CSF PROTEIN=78 GLUCOSE=122 WBC=6 NBL=825     IMPRESSION/PLAN  RIGHT LEG WEAKNESS -- If her weakness and sensory loss were isolated to the right leg, then a lumbosacral plexopathy would be likely, given her diabetes. However, she also has left leg symptoms.    LEFT LEG WEAKNESS -- The constellation of bilateral leg weakness and a spinal cord lesion suggests transverse myelitis, despite the bland CSF. She is improving spontaneously, but perhaps a course of solumedrol is reasonable, despite the hyperglycemia exacerbation such treatment will cause.    AIDP -- Her preserved DTRs, asymmetric weakness and prolonged course argue against this diagnosis, despite the cytoalbumin dissociation on CSF.    CORD LESION -- Will re-review imaging with neuroradiology.     DISPO -- As she is improving, hope for outpatient physical therapy, but will see how much her strength improves.

## 2021-01-19 ENCOUNTER — TRANSCRIPTION ENCOUNTER (OUTPATIENT)
Age: 66
End: 2021-01-19

## 2021-01-19 ENCOUNTER — INPATIENT (INPATIENT)
Facility: HOSPITAL | Age: 66
LOS: 13 days | Discharge: HOME HEALTH | DRG: 949 | End: 2021-02-02
Attending: PHYSICAL MEDICINE & REHABILITATION | Admitting: PHYSICAL MEDICINE & REHABILITATION
Payer: MEDICARE

## 2021-01-19 VITALS
SYSTOLIC BLOOD PRESSURE: 145 MMHG | TEMPERATURE: 98 F | WEIGHT: 142.86 LBS | OXYGEN SATURATION: 99 % | RESPIRATION RATE: 17 BRPM | DIASTOLIC BLOOD PRESSURE: 68 MMHG | HEART RATE: 70 BPM | HEIGHT: 59 IN

## 2021-01-19 VITALS
HEART RATE: 68 BPM | TEMPERATURE: 97 F | RESPIRATION RATE: 17 BRPM | OXYGEN SATURATION: 98 % | DIASTOLIC BLOOD PRESSURE: 71 MMHG | SYSTOLIC BLOOD PRESSURE: 119 MMHG

## 2021-01-19 DIAGNOSIS — I25.10 ATHEROSCLEROTIC HEART DISEASE OF NATIVE CORONARY ARTERY WITHOUT ANGINA PECTORIS: ICD-10-CM

## 2021-01-19 DIAGNOSIS — R20.0 ANESTHESIA OF SKIN: ICD-10-CM

## 2021-01-19 DIAGNOSIS — K59.00 CONSTIPATION, UNSPECIFIED: ICD-10-CM

## 2021-01-19 DIAGNOSIS — E11.65 TYPE 2 DIABETES MELLITUS WITH HYPERGLYCEMIA: ICD-10-CM

## 2021-01-19 DIAGNOSIS — E11.51 TYPE 2 DIABETES MELLITUS WITH DIABETIC PERIPHERAL ANGIOPATHY WITHOUT GANGRENE: ICD-10-CM

## 2021-01-19 DIAGNOSIS — G37.3 ACUTE TRANSVERSE MYELITIS IN DEMYELINATING DISEASE OF CENTRAL NERVOUS SYSTEM: ICD-10-CM

## 2021-01-19 DIAGNOSIS — Z95.820 PERIPHERAL VASCULAR ANGIOPLASTY STATUS WITH IMPLANTS AND GRAFTS: Chronic | ICD-10-CM

## 2021-01-19 DIAGNOSIS — D72.829 ELEVATED WHITE BLOOD CELL COUNT, UNSPECIFIED: ICD-10-CM

## 2021-01-19 DIAGNOSIS — M54.9 DORSALGIA, UNSPECIFIED: ICD-10-CM

## 2021-01-19 DIAGNOSIS — I10 ESSENTIAL (PRIMARY) HYPERTENSION: ICD-10-CM

## 2021-01-19 DIAGNOSIS — Z51.89 ENCOUNTER FOR OTHER SPECIFIED AFTERCARE: ICD-10-CM

## 2021-01-19 DIAGNOSIS — R26.9 UNSPECIFIED ABNORMALITIES OF GAIT AND MOBILITY: ICD-10-CM

## 2021-01-19 DIAGNOSIS — R42 DIZZINESS AND GIDDINESS: ICD-10-CM

## 2021-01-19 DIAGNOSIS — R41.3 OTHER AMNESIA: ICD-10-CM

## 2021-01-19 DIAGNOSIS — E78.5 HYPERLIPIDEMIA, UNSPECIFIED: ICD-10-CM

## 2021-01-19 PROBLEM — I73.9 PERIPHERAL VASCULAR DISEASE, UNSPECIFIED: Chronic | Status: ACTIVE | Noted: 2021-01-12

## 2021-01-19 PROBLEM — E11.9 TYPE 2 DIABETES MELLITUS WITHOUT COMPLICATIONS: Chronic | Status: ACTIVE | Noted: 2021-01-11

## 2021-01-19 LAB
ALBUMIN SERPL ELPH-MCNC: 3.4 G/DL — SIGNIFICANT CHANGE UP (ref 3.3–5)
ALP SERPL-CCNC: 94 U/L — SIGNIFICANT CHANGE UP (ref 40–120)
ALT FLD-CCNC: 31 U/L — SIGNIFICANT CHANGE UP (ref 10–45)
ANION GAP SERPL CALC-SCNC: 11 MMOL/L — SIGNIFICANT CHANGE UP (ref 5–17)
AST SERPL-CCNC: 19 U/L — SIGNIFICANT CHANGE UP (ref 10–40)
BASOPHILS # BLD AUTO: 0.01 K/UL — SIGNIFICANT CHANGE UP (ref 0–0.2)
BASOPHILS NFR BLD AUTO: 0.1 % — SIGNIFICANT CHANGE UP (ref 0–2)
BILIRUB SERPL-MCNC: 0.3 MG/DL — SIGNIFICANT CHANGE UP (ref 0.2–1.2)
BUN SERPL-MCNC: 27 MG/DL — HIGH (ref 7–23)
CALCIUM SERPL-MCNC: 9.4 MG/DL — SIGNIFICANT CHANGE UP (ref 8.4–10.5)
CHLORIDE SERPL-SCNC: 103 MMOL/L — SIGNIFICANT CHANGE UP (ref 96–108)
CO2 SERPL-SCNC: 23 MMOL/L — SIGNIFICANT CHANGE UP (ref 22–31)
CREAT SERPL-MCNC: 0.81 MG/DL — SIGNIFICANT CHANGE UP (ref 0.5–1.3)
EOSINOPHIL # BLD AUTO: 0 K/UL — SIGNIFICANT CHANGE UP (ref 0–0.5)
EOSINOPHIL NFR BLD AUTO: 0 % — SIGNIFICANT CHANGE UP (ref 0–6)
GLUCOSE BLDC GLUCOMTR-MCNC: 278 MG/DL — HIGH (ref 70–99)
GLUCOSE BLDC GLUCOMTR-MCNC: 292 MG/DL — HIGH (ref 70–99)
GLUCOSE BLDC GLUCOMTR-MCNC: 333 MG/DL — HIGH (ref 70–99)
GLUCOSE BLDC GLUCOMTR-MCNC: 403 MG/DL — HIGH (ref 70–99)
GLUCOSE SERPL-MCNC: 247 MG/DL — HIGH (ref 70–99)
HCT VFR BLD CALC: 36 % — SIGNIFICANT CHANGE UP (ref 34.5–45)
HGB BLD-MCNC: 12.2 G/DL — SIGNIFICANT CHANGE UP (ref 11.5–15.5)
IMM GRANULOCYTES NFR BLD AUTO: 0.8 % — SIGNIFICANT CHANGE UP (ref 0–1.5)
LYMPHOCYTES # BLD AUTO: 23.6 % — SIGNIFICANT CHANGE UP (ref 13–44)
LYMPHOCYTES # BLD AUTO: 3.11 K/UL — SIGNIFICANT CHANGE UP (ref 1–3.3)
MCHC RBC-ENTMCNC: 31.4 PG — SIGNIFICANT CHANGE UP (ref 27–34)
MCHC RBC-ENTMCNC: 33.9 GM/DL — SIGNIFICANT CHANGE UP (ref 32–36)
MCV RBC AUTO: 92.5 FL — SIGNIFICANT CHANGE UP (ref 80–100)
MONOCYTES # BLD AUTO: 0.96 K/UL — HIGH (ref 0–0.9)
MONOCYTES NFR BLD AUTO: 7.3 % — SIGNIFICANT CHANGE UP (ref 2–14)
NEUTROPHILS # BLD AUTO: 9.01 K/UL — HIGH (ref 1.8–7.4)
NEUTROPHILS NFR BLD AUTO: 68.2 % — SIGNIFICANT CHANGE UP (ref 43–77)
NRBC # BLD: 0 /100 WBCS — SIGNIFICANT CHANGE UP (ref 0–0)
PLATELET # BLD AUTO: 255 K/UL — SIGNIFICANT CHANGE UP (ref 150–400)
POTASSIUM SERPL-MCNC: 4.3 MMOL/L — SIGNIFICANT CHANGE UP (ref 3.5–5.3)
POTASSIUM SERPL-SCNC: 4.3 MMOL/L — SIGNIFICANT CHANGE UP (ref 3.5–5.3)
PROT SERPL-MCNC: 5.8 G/DL — LOW (ref 6–8.3)
RBC # BLD: 3.89 M/UL — SIGNIFICANT CHANGE UP (ref 3.8–5.2)
RBC # FLD: 14 % — SIGNIFICANT CHANGE UP (ref 10.3–14.5)
SODIUM SERPL-SCNC: 137 MMOL/L — SIGNIFICANT CHANGE UP (ref 135–145)
WBC # BLD: 13.19 K/UL — HIGH (ref 3.8–10.5)
WBC # FLD AUTO: 13.19 K/UL — HIGH (ref 3.8–10.5)

## 2021-01-19 PROCEDURE — 99285 EMERGENCY DEPT VISIT HI MDM: CPT | Mod: 25

## 2021-01-19 PROCEDURE — 82962 GLUCOSE BLOOD TEST: CPT

## 2021-01-19 PROCEDURE — 80053 COMPREHEN METABOLIC PANEL: CPT

## 2021-01-19 PROCEDURE — 99232 SBSQ HOSP IP/OBS MODERATE 35: CPT

## 2021-01-19 PROCEDURE — U0005: CPT

## 2021-01-19 PROCEDURE — 74018 RADEX ABDOMEN 1 VIEW: CPT | Mod: 26

## 2021-01-19 PROCEDURE — 36415 COLL VENOUS BLD VENIPUNCTURE: CPT

## 2021-01-19 PROCEDURE — 72158 MRI LUMBAR SPINE W/O & W/DYE: CPT

## 2021-01-19 PROCEDURE — 86038 ANTINUCLEAR ANTIBODIES: CPT

## 2021-01-19 PROCEDURE — 89051 BODY FLUID CELL COUNT: CPT

## 2021-01-19 PROCEDURE — 86789 WEST NILE VIRUS ANTIBODY: CPT

## 2021-01-19 PROCEDURE — 97162 PT EVAL MOD COMPLEX 30 MIN: CPT

## 2021-01-19 PROCEDURE — 84446 ASSAY OF VITAMIN E: CPT

## 2021-01-19 PROCEDURE — 86901 BLOOD TYPING SEROLOGIC RH(D): CPT

## 2021-01-19 PROCEDURE — 85610 PROTHROMBIN TIME: CPT

## 2021-01-19 PROCEDURE — 86769 SARS-COV-2 COVID-19 ANTIBODY: CPT

## 2021-01-19 PROCEDURE — 70450 CT HEAD/BRAIN W/O DYE: CPT

## 2021-01-19 PROCEDURE — 86900 BLOOD TYPING SEROLOGIC ABO: CPT

## 2021-01-19 PROCEDURE — 85027 COMPLETE CBC AUTOMATED: CPT

## 2021-01-19 PROCEDURE — 87070 CULTURE OTHR SPECIMN AEROBIC: CPT

## 2021-01-19 PROCEDURE — 82746 ASSAY OF FOLIC ACID SERUM: CPT

## 2021-01-19 PROCEDURE — 72157 MRI CHEST SPINE W/O & W/DYE: CPT

## 2021-01-19 PROCEDURE — 83516 IMMUNOASSAY NONANTIBODY: CPT

## 2021-01-19 PROCEDURE — 84480 ASSAY TRIIODOTHYRONINE (T3): CPT

## 2021-01-19 PROCEDURE — 97112 NEUROMUSCULAR REEDUCATION: CPT

## 2021-01-19 PROCEDURE — 76498 UNLISTED MR PROCEDURE: CPT

## 2021-01-19 PROCEDURE — 97535 SELF CARE MNGMENT TRAINING: CPT

## 2021-01-19 PROCEDURE — 72131 CT LUMBAR SPINE W/O DYE: CPT

## 2021-01-19 PROCEDURE — 86053 AQAPRN-4 ANTB FLO CYTMTRY EA: CPT

## 2021-01-19 PROCEDURE — U0003: CPT

## 2021-01-19 PROCEDURE — 97166 OT EVAL MOD COMPLEX 45 MIN: CPT

## 2021-01-19 PROCEDURE — 82164 ANGIOTENSIN I ENZYME TEST: CPT

## 2021-01-19 PROCEDURE — 85025 COMPLETE CBC W/AUTO DIFF WBC: CPT

## 2021-01-19 PROCEDURE — 84443 ASSAY THYROID STIM HORMONE: CPT

## 2021-01-19 PROCEDURE — 82945 GLUCOSE OTHER FLUID: CPT

## 2021-01-19 PROCEDURE — 97116 GAIT TRAINING THERAPY: CPT

## 2021-01-19 PROCEDURE — 86803 HEPATITIS C AB TEST: CPT

## 2021-01-19 PROCEDURE — 87483 CNS DNA AMP PROBE TYPE 12-25: CPT

## 2021-01-19 PROCEDURE — 86431 RHEUMATOID FACTOR QUANT: CPT

## 2021-01-19 PROCEDURE — 84157 ASSAY OF PROTEIN OTHER: CPT

## 2021-01-19 PROCEDURE — 84166 PROTEIN E-PHORESIS/URINE/CSF: CPT

## 2021-01-19 PROCEDURE — 62328 DX LMBR SPI PNXR W/FLUOR/CT: CPT

## 2021-01-19 PROCEDURE — 99223 1ST HOSP IP/OBS HIGH 75: CPT

## 2021-01-19 PROCEDURE — 86788 WEST NILE VIRUS AB IGM: CPT

## 2021-01-19 PROCEDURE — 87040 BLOOD CULTURE FOR BACTERIA: CPT

## 2021-01-19 PROCEDURE — 82525 ASSAY OF COPPER: CPT

## 2021-01-19 PROCEDURE — 84436 ASSAY OF TOTAL THYROXINE: CPT

## 2021-01-19 PROCEDURE — 86036 ANCA SCREEN EACH ANTIBODY: CPT

## 2021-01-19 PROCEDURE — 87205 SMEAR GRAM STAIN: CPT

## 2021-01-19 PROCEDURE — 88184 FLOWCYTOMETRY/ TC 1 MARKER: CPT

## 2021-01-19 PROCEDURE — 93005 ELECTROCARDIOGRAM TRACING: CPT

## 2021-01-19 PROCEDURE — 86235 NUCLEAR ANTIGEN ANTIBODY: CPT

## 2021-01-19 PROCEDURE — 88108 CYTOPATH CONCENTRATE TECH: CPT

## 2021-01-19 PROCEDURE — 85730 THROMBOPLASTIN TIME PARTIAL: CPT

## 2021-01-19 PROCEDURE — 97530 THERAPEUTIC ACTIVITIES: CPT

## 2021-01-19 PROCEDURE — 88185 FLOWCYTOMETRY/TC ADD-ON: CPT

## 2021-01-19 PROCEDURE — 86850 RBC ANTIBODY SCREEN: CPT

## 2021-01-19 PROCEDURE — 97110 THERAPEUTIC EXERCISES: CPT

## 2021-01-19 PROCEDURE — 99238 HOSP IP/OBS DSCHRG MGMT 30/<: CPT

## 2021-01-19 PROCEDURE — 83520 IMMUNOASSAY QUANT NOS NONAB: CPT

## 2021-01-19 PROCEDURE — 83036 HEMOGLOBIN GLYCOSYLATED A1C: CPT

## 2021-01-19 PROCEDURE — 82607 VITAMIN B-12: CPT

## 2021-01-19 RX ORDER — INSULIN LISPRO 100/ML
VIAL (ML) SUBCUTANEOUS AT BEDTIME
Refills: 0 | Status: DISCONTINUED | OUTPATIENT
Start: 2021-01-19 | End: 2021-01-20

## 2021-01-19 RX ORDER — GABAPENTIN 400 MG/1
100 CAPSULE ORAL THREE TIMES A DAY
Refills: 0 | Status: DISCONTINUED | OUTPATIENT
Start: 2021-01-19 | End: 2021-01-20

## 2021-01-19 RX ORDER — SENNA PLUS 8.6 MG/1
2 TABLET ORAL AT BEDTIME
Refills: 0 | Status: DISCONTINUED | OUTPATIENT
Start: 2021-01-19 | End: 2021-01-19

## 2021-01-19 RX ORDER — MULTIVIT WITH MIN/MFOLATE/K2 340-15/3 G
1 POWDER (GRAM) ORAL ONCE
Refills: 0 | Status: COMPLETED | OUTPATIENT
Start: 2021-01-19 | End: 2021-01-19

## 2021-01-19 RX ORDER — PANTOPRAZOLE SODIUM 20 MG/1
40 TABLET, DELAYED RELEASE ORAL
Refills: 0 | Status: DISCONTINUED | OUTPATIENT
Start: 2021-01-20 | End: 2021-02-02

## 2021-01-19 RX ORDER — POLYETHYLENE GLYCOL 3350 17 G/17G
17 POWDER, FOR SOLUTION ORAL
Refills: 0 | Status: DISCONTINUED | OUTPATIENT
Start: 2021-01-19 | End: 2021-01-25

## 2021-01-19 RX ORDER — SENNA PLUS 8.6 MG/1
2 TABLET ORAL
Qty: 0 | Refills: 0 | DISCHARGE
Start: 2021-01-19

## 2021-01-19 RX ORDER — DEXTROSE 50 % IN WATER 50 %
25 SYRINGE (ML) INTRAVENOUS ONCE
Refills: 0 | Status: DISCONTINUED | OUTPATIENT
Start: 2021-01-19 | End: 2021-01-28

## 2021-01-19 RX ORDER — SODIUM CHLORIDE 9 MG/ML
1000 INJECTION, SOLUTION INTRAVENOUS
Refills: 0 | Status: DISCONTINUED | OUTPATIENT
Start: 2021-01-19 | End: 2021-01-28

## 2021-01-19 RX ORDER — DEXTROSE 50 % IN WATER 50 %
12.5 SYRINGE (ML) INTRAVENOUS ONCE
Refills: 0 | Status: DISCONTINUED | OUTPATIENT
Start: 2021-01-19 | End: 2021-01-28

## 2021-01-19 RX ORDER — ENOXAPARIN SODIUM 100 MG/ML
40 INJECTION SUBCUTANEOUS AT BEDTIME
Refills: 0 | Status: DISCONTINUED | OUTPATIENT
Start: 2021-01-19 | End: 2021-02-02

## 2021-01-19 RX ORDER — GLUCAGON INJECTION, SOLUTION 0.5 MG/.1ML
1 INJECTION, SOLUTION SUBCUTANEOUS ONCE
Refills: 0 | Status: DISCONTINUED | OUTPATIENT
Start: 2021-01-19 | End: 2021-02-02

## 2021-01-19 RX ORDER — INSULIN LISPRO 100/ML
VIAL (ML) SUBCUTANEOUS
Refills: 0 | Status: DISCONTINUED | OUTPATIENT
Start: 2021-01-19 | End: 2021-01-20

## 2021-01-19 RX ORDER — ASPIRIN/CALCIUM CARB/MAGNESIUM 324 MG
81 TABLET ORAL DAILY
Refills: 0 | Status: DISCONTINUED | OUTPATIENT
Start: 2021-01-20 | End: 2021-02-02

## 2021-01-19 RX ORDER — DEXTROSE 50 % IN WATER 50 %
15 SYRINGE (ML) INTRAVENOUS ONCE
Refills: 0 | Status: DISCONTINUED | OUTPATIENT
Start: 2021-01-19 | End: 2021-01-28

## 2021-01-19 RX ORDER — SENNA PLUS 8.6 MG/1
2 TABLET ORAL AT BEDTIME
Refills: 0 | Status: DISCONTINUED | OUTPATIENT
Start: 2021-01-19 | End: 2021-01-25

## 2021-01-19 RX ORDER — DONEPEZIL HYDROCHLORIDE 10 MG/1
10 TABLET, FILM COATED ORAL AT BEDTIME
Refills: 0 | Status: DISCONTINUED | OUTPATIENT
Start: 2021-01-19 | End: 2021-02-02

## 2021-01-19 RX ORDER — AMLODIPINE BESYLATE 2.5 MG/1
5 TABLET ORAL DAILY
Refills: 0 | Status: DISCONTINUED | OUTPATIENT
Start: 2021-01-20 | End: 2021-02-02

## 2021-01-19 RX ORDER — ATORVASTATIN CALCIUM 80 MG/1
80 TABLET, FILM COATED ORAL AT BEDTIME
Refills: 0 | Status: DISCONTINUED | OUTPATIENT
Start: 2021-01-19 | End: 2021-02-02

## 2021-01-19 RX ORDER — CLOPIDOGREL BISULFATE 75 MG/1
75 TABLET, FILM COATED ORAL DAILY
Refills: 0 | Status: DISCONTINUED | OUTPATIENT
Start: 2021-01-20 | End: 2021-02-02

## 2021-01-19 RX ORDER — ACETAMINOPHEN 500 MG
650 TABLET ORAL EVERY 6 HOURS
Refills: 0 | Status: DISCONTINUED | OUTPATIENT
Start: 2021-01-19 | End: 2021-02-02

## 2021-01-19 RX ADMIN — Medication 1 BOTTLE: at 12:17

## 2021-01-19 RX ADMIN — SENNA PLUS 2 TABLET(S): 8.6 TABLET ORAL at 21:43

## 2021-01-19 RX ADMIN — DONEPEZIL HYDROCHLORIDE 10 MILLIGRAM(S): 10 TABLET, FILM COATED ORAL at 21:42

## 2021-01-19 RX ADMIN — Medication 58 MILLIGRAM(S): at 04:37

## 2021-01-19 RX ADMIN — GABAPENTIN 100 MILLIGRAM(S): 400 CAPSULE ORAL at 04:36

## 2021-01-19 RX ADMIN — Medication 81 MILLIGRAM(S): at 12:16

## 2021-01-19 RX ADMIN — GABAPENTIN 100 MILLIGRAM(S): 400 CAPSULE ORAL at 12:16

## 2021-01-19 RX ADMIN — Medication 2: at 21:43

## 2021-01-19 RX ADMIN — AMLODIPINE BESYLATE 5 MILLIGRAM(S): 2.5 TABLET ORAL at 04:36

## 2021-01-19 RX ADMIN — CLOPIDOGREL BISULFATE 75 MILLIGRAM(S): 75 TABLET, FILM COATED ORAL at 12:16

## 2021-01-19 RX ADMIN — POLYETHYLENE GLYCOL 3350 17 GRAM(S): 17 POWDER, FOR SOLUTION ORAL at 17:58

## 2021-01-19 RX ADMIN — ENOXAPARIN SODIUM 40 MILLIGRAM(S): 100 INJECTION SUBCUTANEOUS at 21:51

## 2021-01-19 RX ADMIN — Medication 16: at 17:57

## 2021-01-19 RX ADMIN — Medication 12: at 08:22

## 2021-01-19 RX ADMIN — PANTOPRAZOLE SODIUM 40 MILLIGRAM(S): 20 TABLET, DELAYED RELEASE ORAL at 08:25

## 2021-01-19 RX ADMIN — ATORVASTATIN CALCIUM 80 MILLIGRAM(S): 80 TABLET, FILM COATED ORAL at 21:42

## 2021-01-19 RX ADMIN — Medication 24: at 12:16

## 2021-01-19 RX ADMIN — GABAPENTIN 100 MILLIGRAM(S): 400 CAPSULE ORAL at 21:45

## 2021-01-19 RX ADMIN — Medication 1 ENEMA: at 18:22

## 2021-01-19 NOTE — H&P ADULT - NSHPPHYSICALEXAM_GEN_ALL_CORE
PHYSICAL EXAMINATION   VItals: T(C): 36.3 (01-19-21 @ 12:26), Max: 37 (01-18-21 @ 15:46)  HR: 68 (01-19-21 @ 12:26) (59 - 94)  BP: 119/71 (01-19-21 @ 12:26) (119/71 - 158/88)  RR: 17 (01-19-21 @ 12:26) (17 - 18)  SpO2: 98% (01-19-21 @ 12:26) (94% - 98%)    General: NAD, Resting Comfortable,                                  HEENT: NC/AT, EOM I, PERRLA, Normal Conjunctivae  Cardio: RRR, Normal S1-S2, No M/G/R                              Pulm: No Respiratory Distress,  Lungs CTAB                        Abdomen: ND/NT, Soft, BS+                                                MSK: No joint swelling, Full ROM                                         Ext: No C/C/E, Pulses 2+ throughout, No calf tenderness    Skin:  all skin intact                                                                   Neurological Examination    Cognitive: AAO x 3                                                                         Attention: Intact   Judgment: Good evidence of judgement                               Memory: Recall 3 objects immediate and 3 min later      Mood/Affect: wnl                                                                           Communication:  Fluent,  No dysarthria   Swallow: intact  CN II - XII  intact  Coordination: FTN/HTS intact                                                                              Sensory: Intact to light touch, PP and Vibration                                                                                             Tone: normal Throughout   Balance: impaired    Motor    LEFT    UE: SF [5/5], EF [5/5], EE [5/5], WE [5/5],  [wnl]  RIGHT UE: SF [5/5], EF [5/5], EE [5/5], WE [5/5],  [wnl]  LEFT    LE:  HF [5/5], KE [5/5], DF [5/5], EHL [5/5],  PF [5/5]  RIGHT LE:  HF [5/5], KE [5/5], DF [5/5], EHL [5/5],  PF [5/5]      Reflex:  2 + thoroughout, Garcia/Babinski negative Vital Signs Last 24 Hrs  T(C): 36.4 (19 Jan 2021 17:11), Max: 36.8 (18 Jan 2021 19:29)  T(F): 97.6 (19 Jan 2021 17:11), Max: 98.2 (18 Jan 2021 19:29)  HR: 70 (19 Jan 2021 17:11) (59 - 94)  BP: 145/68 (19 Jan 2021 17:11) (119/71 - 158/88)  RR: 17 (19 Jan 2021 17:11) (17 - 18)  SpO2: 99% (19 Jan 2021 17:11) (94% - 99%)    General: NAD, Resting Comfortable,                                  HEENT: NC/AT, EOM I, PERRLA, Normal Conjunctivae  Cardio: RRR, Normal S1-S2, No M/G/R                              Pulm: No Respiratory Distress,  Lungs CTAB                        Abdomen: NT, Soft, BS+, abdomen distended                                                MSK: No joint swelling, Full ROM                                         Ext: No C/C/E, Pulses 2+ throughout, No calf tenderness    Skin:  all skin intact, ecchymosis on the left thigh (site of lovenox injection as per patient)                                                                Neurological Examination    Cognitive: AAO x 3                                                                         Mood/Affect: wnl                                                                           Communication:  Fluent                                                                              Sensory: diminished to light touch on the abdomen below the T7 - L1 level bilaterally and right L2-S1, intact on left L2-s1 on the left                                                                               Tone: normal Throughout     Motor    LEFT    UE: SF [5/5], EF [5/5], EE [5/5], WE [5/5],  [wnl]  RIGHT UE: SF [5/5], EF [5/5], EE [5/5], WE [5/5],  [wnl]  LEFT    LE:  HF [5/5], KE [5/5], DF [5/5], EHL [5/5],  PF [5/5]  RIGHT LE:  HF [4/5], KE [5/5], DF [4/5], EHL [3/5],  PF [4/5]      Reflex:  2 + thoroughout, Garcia/Babinski negative

## 2021-01-19 NOTE — H&P ADULT - HISTORY OF PRESENT ILLNESS
65 year old RH Yi speaking female with a past medical history of HTN, DM, HLD, peripheral artery stents, and memory loss who presented to [facility] on 1/11 with inability to ambulate and lower extremity weakness. At baseline, the patient states that she is able to ambulate without any assistive devices but does so slowly. Approximately 1 week PTA she began to feel a pain in her lower abdomen that wrapped around her stomach and radiated to her back bilaterally at approximately the belly button level. She stated that the pain was uncomfortable but was not associated with any weakness and she was still able to perform her daily activities. Did not have any nausea/vomiting at that time. Pain was present for several days and then 3 days PTA she noted that she began to have lower extremity weakness which was worse in her RLE. Over that time, she noted that it was difficult for her to get up and ambulate and she required assistance from her family in order to walk. She then presented to the ED (1/11) for further evaluation. Patient notes that she feels that she has reduced sensation in her lower extremities. Denies head trauma. Denies any symptoms in her upper extremities, or face. Denies headaches, dizziness, lightheadedness, dysphagia, dysarthria, difficulty expressing herself. Denies any recent illnesses, sick contacts, animal exposure or bites. States that she received a flu vaccine in November and has not had any vaccinations since then. No exposure to COVID-19.     Patient received CT head (microvascular disease volume loss) and MR lumbosacral spine concerning for abnormal signal intensity at T9-T11, prompting further spinal imaging and lumbar puncture to rule out other etiologies for abnormal signal intensity. Patient arrived to the floor in stable condition. MR cervical and thoracic spine revealed  focus of abnormal signal at T10 that does not enhance. CSF studies showed 6 cells, protein of 78, negative PCR, and negative flow cytometry. NMO and MOG serum studies were sent. Patient was treated with a three day course of methylprednisolone (1/15-1/17).   Patient was deemed medically stable and discharged to [facility] on 1/19.

## 2021-01-19 NOTE — H&P ADULT - NSICDXPASTMEDICALHX_GEN_ALL_CORE_FT
PAST MEDICAL HISTORY:  DM (diabetes mellitus)     HTN (hypertension)     Memory loss     PAD (peripheral artery disease)

## 2021-01-19 NOTE — H&P ADULT - NSHPREVIEWOFSYSTEMS_GEN_ALL_CORE
REVIEW OF SYSTEMS  Constitutional: No fever, No Chills, No fatigue  HEENT: No eye pain, No visual disturbances, No difficulty hearing  Pulm: No cough,  No shortness of breath  Cardio: No chest pain, No palpitations  GI:  No abdominal pain, No nausea, No vomiting, No diarrhea, No constipation  : No dysuria, No frequency, No hematuria  Neuro: No headaches, No memory loss, No loss of strength, No numbness, No tremors  Skin: No itching, No rashes, No lesions   Endo: No temperature intolerance  MSK: No joint pain, No joint swelling, No muscle pain, No Neck or back pain  Psych:  No depression, No anxiety REVIEW OF SYSTEMS  Constitutional: No fever, No Chills, No fatigue  HEENT: No eye pain, No visual disturbances, No difficulty hearing  Pulm: No cough,  No shortness of breath  Cardio: No chest pain, No palpitations  GI:  No abdominal pain, No nausea, No vomiting, No diarrhea, + constipation, states last bm was on 1/11, as per nursing, Bm on 1/16. patient states that she is passing Gas  : No dysuria, No frequency, No hematuria  Neuro: No headaches, No memory loss, + loss of strength, + numbness, No tremors  Skin: No itching, No rashes, No lesions   Endo: No temperature intolerance  MSK: No joint pain, No joint swelling, No muscle pain, No Neck or back pain  Psych:  No depression, No anxiety

## 2021-01-19 NOTE — H&P ADULT - ATTENDING COMMENTS
I have personally performed face to face diagnostic evaluation on this patient. I have reviewed note and agree with history exam, plan of care except as noted.   start inpatient rehab program  continue DVT prophylaxis  titrate pain meds.   Last bowel movement yesterday. KUB reviewed.   Vital Signs Last 24 Hrs  T(C): 36.4 (20 Jan 2021 09:34), Max: 36.5 (19 Jan 2021 19:56)  T(F): 97.6 (20 Jan 2021 09:34), Max: 97.7 (19 Jan 2021 19:56)  HR: 61 (20 Jan 2021 08:55) (61 - 70)  BP: 135/83 (20 Jan 2021 08:55) (119/71 - 153/92)  BP(mean): --  RR: 18 (20 Jan 2021 08:55) (16 - 18)  SpO2: 100% (20 Jan 2021 08:55) (97% - 100%)                          12.3   13.66 )-----------( 257      ( 20 Jan 2021 06:47 )             36.8     01-20    136  |  102  |  32<H>  ----------------------------<  261<H>  4.9   |  29  |  0.90    Ca    8.8      20 Jan 2021 06:47    TPro  6.3  /  Alb  2.9<L>  /  TBili  0.4  /  DBili  x   /  AST  24  /  ALT  48<H>  /  AlkPhos  100  01-20      MEDICAL PROGNOSIS: GOOD                                   REHAB POTENTIAL: GOOD  ESTIMATED DISPOSITION: HOME                             ELOS: 10-14 Days   EXPECTED THERAPY:     P.T. 1 hr/day       O.T. 1 hr/day      S.L.P. 1 hr/day      EXP FREQUENCY: 5 days per 7 day period     PRESCREEN COMPARISON I have reviewed the prescreen information and I have found no relevant changes between the preadmission screening and my post admission evaluation     RATIONALE FOR INPATIENT ADMISSION - Patient demonstrates the following: (check all that apply)  [X] Medically appropriate for rehabilitation admission  [X] Has attainable rehab goals with an appropriate initial discharge plan  [X] Has rehabilitation potential (expected to make a significant improvement within a reasonable period of time)   [X] Requires close medical management by a rehab physician, rehab nursing care, Hospitalist and comprehensive interdisciplinary team (including PT, OT, & or SLP, Prosthetics and Orthotics)

## 2021-01-19 NOTE — PROGRESS NOTE ADULT - REASON FOR ADMISSION
inability to ambulate

## 2021-01-19 NOTE — H&P ADULT - NSHPSOCIALHISTORY_GEN_ALL_CORE
SOCIAL HISTORY  Smoking - Denied  EtOH - Denied   Drugs - Denied    FUNCTIONAL HISTORY  Pt lives in an apartment w/ spouse, has steps to enter but also ramp & elevator access. PTA pt reports being independent w/ ADL's & functional mobility & did not utilize an AD for ambulation    CURRENT FUNCTIONAL STATUS 1/19  - Bed Mobility: supervision  - Transfers:  min a  - Gait: min a 20 feet x 2 with RW SOCIAL HISTORY  Smoking - Denied  EtOH - Denied   Drugs - Denied    FUNCTIONAL HISTORY  Pt lives in an apartment w/ spouse on 2nd floow. No richy and has elevator and ramp , has steps to enter but also ramp & elevator access. PTA pt reports being independent w/ ADL's & functional mobility & did not utilize an AD for ambulation    CURRENT FUNCTIONAL STATUS 1/19  - Bed Mobility: supervision  - Transfers:  min a  - Gait: min a 20 feet x 2 with RW

## 2021-01-19 NOTE — PROGRESS NOTE ADULT - PROVIDER SPECIALTY LIST ADULT
Neurosurgery
Intervent Radiology
Neurology
Rehab Medicine
Neurology
Neurology

## 2021-01-19 NOTE — H&P ADULT - ASSESSMENT
ASSESSMENT/PLAN  65 year old RH past medical history of HTN, DM, HLD, peripheral artery stents, memory loss who presents with inability to ambulate and lower extremity weakness due to transverse myeiltis with abnormal signal at T10 on MRI s/p steroid therapy  with Gait Instability, ADL impairments and Functional impairments.    COMORBIDITES/ACTIVE MEDICAL ISSUES     Gait Instability, ADL impairments and Functional impairments: start Comprehensive Rehab Program of PT/OT     #Transverse Myelitis (T10)  - s/p methylprednisolone therapy x 3 days, likely cause of leukocytosis  - f/u NMO ab  - Bowel Mgmt -  Continent c/w  Senna   - GI ppx with PPI  - Bladder: PVR q 8 hours (SC if > 400)    #Mood  - on aricept    #HTN/HLD/PAD with stent  - ASA/ plavix  - norvasc  - statin     #DM  -likely increased ISS scale/ BG due to recent steroid use  - will taper during course  - ISS    -Pain control: Tylenol PRN, gabapentin  - DVT: Lovenox, SCDs, TEDs     FEN   - Diet - Regular + Thins  [CCHO, DASH/TLC]      Precautions / PROPHYLAXIS:   - Falls  - ortho: Weight bearing status: full  - Lungs: Aspiration, Incentive Spirometer   - Pressure injury/Skin: Turn Q2hrs while in bed, OOB to Chair, PT/OT        MEDICAL PROGNOSIS: GOOD            REHAB POTENTIAL: GOOD             ESTIMATED DISPOSITION: HOME WITH HOME CARE            ELOS: 10-14 Days   EXPECTED THERAPY:     P.T. 2 hr/day       O.T. 1hr/day      S.L.P. n/a hr/day     P&O Unnecessary     EXP FREQUENCY: 5 days per 7 day period     PRESCREEN COMPARISION:   I have reviewed the prescreen information and I have found no relevant changes between the preadmission screening and my post admission evaluation     RATIONALE FOR INPATIENT ADMISSION - Patient demonstrates the following: (check all that apply)  [X] Medically appropriate for rehabilitation admission  [X] Has attainable rehab goals with an appropriate initial discharge plan  [X] Has rehabilitation potential (expected to make a significant improvement within a reasonable period of time)   [X] Requires close medical management by a rehab physician, rehab nursing care, Hospitalist and comprehensive interdisciplinary team (including PT, OT, & or SLP, Prosthetics and Orthotics)     ASSESSMENT/PLAN  65 year old RH past medical history of HTN, DM, HLD, peripheral artery stents, memory loss who presents with inability to ambulate and lower extremity weakness due to transverse myeiltis with abnormal signal at T10 on MRI s/p steroid therapy  with Gait Instability, ADL impairments and Functional impairments.    COMORBIDITES/ACTIVE MEDICAL ISSUES     Gait Instability, ADL impairments and Functional impairments: start Comprehensive Rehab Program of PT/OT     #Transverse Myelitis (T10)  - s/p methylprednisolone therapy x 3 days, likely cause of leukocytosis  - f/u NMO ab  - Bowel Mgmt -  constipation reports last bm was on 1/11.  c/w  Senna, start miralax, enema today, dulc suppository prn, abdominal xray  - GI ppx with PPI  - Bladder: PVR q 8 hours (SC if > 400)    #Mood  - on aricept    #HTN/HLD/PAD with stent  - ASA/ plavix  - norvasc  - statin     #DM  -likely increased ISS scale/ BG due to recent steroid use  - will taper during course  - ISS    -Pain control: Tylenol PRN, gabapentin  - DVT: Lovenox, SCDs, TEDs     FEN   - Diet - Regular + Thins  [CCHO, DASH/TLC]      Precautions / PROPHYLAXIS:   - Falls  - ortho: Weight bearing status: full  - Lungs: Aspiration, Incentive Spirometer   - Pressure injury/Skin: Turn Q2hrs while in bed, OOB to Chair, PT/OT        MEDICAL PROGNOSIS: GOOD            REHAB POTENTIAL: GOOD             ESTIMATED DISPOSITION: HOME WITH HOME CARE            ELOS: 10-14 Days   EXPECTED THERAPY:     P.T. 2 hr/day       O.T. 1hr/day      S.L.P. n/a hr/day     P&O Unnecessary     EXP FREQUENCY: 5 days per 7 day period     PRESCREEN COMPARISION:   I have reviewed the prescreen information and I have found no relevant changes between the preadmission screening and my post admission evaluation     RATIONALE FOR INPATIENT ADMISSION - Patient demonstrates the following: (check all that apply)  [X] Medically appropriate for rehabilitation admission  [X] Has attainable rehab goals with an appropriate initial discharge plan  [X] Has rehabilitation potential (expected to make a significant improvement within a reasonable period of time)   [X] Requires close medical management by a rehab physician, rehab nursing care, Hospitalist and comprehensive interdisciplinary team (including PT, OT, & or SLP, Prosthetics and Orthotics)

## 2021-01-19 NOTE — PROGRESS NOTE ADULT - SUBJECTIVE AND OBJECTIVE BOX
Patient is a 65y old  Female who presents with a chief complaint of inability to ambulate (13 Jan 2021 07:27)    Patient without complaints.  No CP, no SOB, no N/V.  Tolerating therapies - min A transfers, CG gait.    MEDICATIONS  (STANDING):  amLODIPine   Tablet 5 milliGRAM(s) Oral daily  aspirin  chewable 81 milliGRAM(s) Oral daily  atorvastatin 80 milliGRAM(s) Oral at bedtime  clopidogrel Tablet 75 milliGRAM(s) Oral daily  dextrose 40% Gel 15 Gram(s) Oral once  dextrose 5%. 1000 milliLiter(s) (50 mL/Hr) IV Continuous <Continuous>  dextrose 5%. 1000 milliLiter(s) (100 mL/Hr) IV Continuous <Continuous>  dextrose 50% Injectable 25 Gram(s) IV Push once  dextrose 50% Injectable 12.5 Gram(s) IV Push once  dextrose 50% Injectable 25 Gram(s) IV Push once  donepezil 10 milliGRAM(s) Oral at bedtime  enoxaparin Injectable 40 milliGRAM(s) SubCutaneous at bedtime  gabapentin 100 milliGRAM(s) Oral three times a day  glucagon  Injectable 1 milliGRAM(s) IntraMuscular once  insulin lispro (ADMELOG) corrective regimen sliding scale   SubCutaneous three times a day before meals  insulin lispro (ADMELOG) corrective regimen sliding scale   SubCutaneous at bedtime  magnesium citrate Oral Solution 1 Bottle Oral once  methylPREDNISolone sodium succinate IVPB 1000 milliGRAM(s) IV Intermittent daily  pantoprazole    Tablet 40 milliGRAM(s) Oral before breakfast  senna 2 Tablet(s) Oral at bedtime    MEDICATIONS  (PRN):    Vital Signs Last 24 Hrs  T(C): 36.4 (19 Jan 2021 07:39), Max: 37 (18 Jan 2021 15:46)  T(F): 97.5 (19 Jan 2021 07:39), Max: 98.6 (18 Jan 2021 15:46)  HR: 78 (19 Jan 2021 07:39) (59 - 94)  BP: 130/73 (19 Jan 2021 07:39) (130/73 - 158/88)  BP(mean): --  RR: 18 (19 Jan 2021 07:39) (18 - 18)  SpO2: 97% (19 Jan 2021 07:39) (94% - 98%)    PHYSICAL EXAM  Constitutional - NAD, Comfortable  HEENT - NCAT, EOMI  Neck - Supple, No limited ROM  Extremities - No C/C/E, No calf tenderness   Neurologic Exam -                   AAO x 3    Speech clear/fluent/intelligible    Motor 4+/5 bl UE and LE    Sens intact    No clonus  Psychiatric - Mood stable, Affect WNL                          12.2   13.19 )-----------( 255      ( 19 Jan 2021 06:23 )             36.0     01-19    137  |  103  |  27<H>  ----------------------------<  247<H>  4.3   |  23  |  0.81    Ca    9.4      19 Jan 2021 06:22    TPro  5.8<L>  /  Alb  3.4  /  TBili  0.3  /  DBili  x   /  AST  19  /  ALT  31  /  AlkPhos  94  01-19      Impression:  64 yo with functional deficits secondary to diagnosis of lower extremity weakness secondary to transverse myelitis.  Plan:  PT- ROM, Bed Mob, Transfers, Amb w AD and bracing as needed  OT- ADLs, bracing  Prec- Falls, Cardiac  DVT Prophylaxis- SCDs, Lovenox  Skin- Turn q2 h  Dispo- Acute Rehab- can tolerate 3h/d of therapies and requires daily physician visits

## 2021-01-19 NOTE — DISCHARGE NOTE NURSING/CASE MANAGEMENT/SOCIAL WORK - PATIENT PORTAL LINK FT
You can access the FollowMyHealth Patient Portal offered by [facility] by registering at the following website: [email or web address]. By joining [employer]’s FollowMyHealth portal, you will also be able to view your health information using other applications (apps) compatible with our system.

## 2021-01-20 PROBLEM — Z00.00 ENCOUNTER FOR PREVENTIVE HEALTH EXAMINATION: Status: ACTIVE | Noted: 2021-01-20

## 2021-01-20 LAB
ALBUMIN SERPL ELPH-MCNC: 2.9 G/DL — LOW (ref 3.3–5)
ALP SERPL-CCNC: 100 U/L — SIGNIFICANT CHANGE UP (ref 40–120)
ALT FLD-CCNC: 48 U/L — HIGH (ref 10–45)
ANION GAP SERPL CALC-SCNC: 5 MMOL/L — SIGNIFICANT CHANGE UP (ref 5–17)
AQP4 H2O CHANNEL AB SERPL IA-ACNC: <1.5 U/ML — SIGNIFICANT CHANGE UP (ref 0–3)
AST SERPL-CCNC: 24 U/L — SIGNIFICANT CHANGE UP (ref 10–40)
BILIRUB SERPL-MCNC: 0.4 MG/DL — SIGNIFICANT CHANGE UP (ref 0.2–1.2)
BUN SERPL-MCNC: 32 MG/DL — HIGH (ref 7–23)
CALCIUM SERPL-MCNC: 8.8 MG/DL — SIGNIFICANT CHANGE UP (ref 8.4–10.5)
CHLORIDE SERPL-SCNC: 102 MMOL/L — SIGNIFICANT CHANGE UP (ref 96–108)
CO2 SERPL-SCNC: 29 MMOL/L — SIGNIFICANT CHANGE UP (ref 22–31)
CREAT SERPL-MCNC: 0.9 MG/DL — SIGNIFICANT CHANGE UP (ref 0.5–1.3)
GLUCOSE BLDC GLUCOMTR-MCNC: 244 MG/DL — HIGH (ref 70–99)
GLUCOSE BLDC GLUCOMTR-MCNC: 263 MG/DL — HIGH (ref 70–99)
GLUCOSE BLDC GLUCOMTR-MCNC: 266 MG/DL — HIGH (ref 70–99)
GLUCOSE BLDC GLUCOMTR-MCNC: 330 MG/DL — HIGH (ref 70–99)
GLUCOSE SERPL-MCNC: 261 MG/DL — HIGH (ref 70–99)
HCT VFR BLD CALC: 36.8 % — SIGNIFICANT CHANGE UP (ref 34.5–45)
HGB BLD-MCNC: 12.3 G/DL — SIGNIFICANT CHANGE UP (ref 11.5–15.5)
MCHC RBC-ENTMCNC: 31 PG — SIGNIFICANT CHANGE UP (ref 27–34)
MCHC RBC-ENTMCNC: 33.4 GM/DL — SIGNIFICANT CHANGE UP (ref 32–36)
MCV RBC AUTO: 92.7 FL — SIGNIFICANT CHANGE UP (ref 80–100)
NRBC # BLD: 0 /100 WBCS — SIGNIFICANT CHANGE UP (ref 0–0)
OLIGOCLONAL BANDS CSF ELPH-IMP: SIGNIFICANT CHANGE UP
OLIGOCLONAL BANDS CSF ELPH-IMP: SIGNIFICANT CHANGE UP
PLATELET # BLD AUTO: 257 K/UL — SIGNIFICANT CHANGE UP (ref 150–400)
POTASSIUM SERPL-MCNC: 4.9 MMOL/L — SIGNIFICANT CHANGE UP (ref 3.5–5.3)
POTASSIUM SERPL-SCNC: 4.9 MMOL/L — SIGNIFICANT CHANGE UP (ref 3.5–5.3)
PROT SERPL-MCNC: 6.3 G/DL — SIGNIFICANT CHANGE UP (ref 6–8.3)
RBC # BLD: 3.97 M/UL — SIGNIFICANT CHANGE UP (ref 3.8–5.2)
RBC # FLD: 14.2 % — SIGNIFICANT CHANGE UP (ref 10.3–14.5)
SARS-COV-2 RNA SPEC QL NAA+PROBE: SIGNIFICANT CHANGE UP
SODIUM SERPL-SCNC: 136 MMOL/L — SIGNIFICANT CHANGE UP (ref 135–145)
WBC # BLD: 13.66 K/UL — HIGH (ref 3.8–10.5)
WBC # FLD AUTO: 13.66 K/UL — HIGH (ref 3.8–10.5)

## 2021-01-20 PROCEDURE — 99232 SBSQ HOSP IP/OBS MODERATE 35: CPT

## 2021-01-20 PROCEDURE — 99223 1ST HOSP IP/OBS HIGH 75: CPT

## 2021-01-20 RX ORDER — GABAPENTIN 400 MG/1
200 CAPSULE ORAL THREE TIMES A DAY
Refills: 0 | Status: DISCONTINUED | OUTPATIENT
Start: 2021-01-20 | End: 2021-01-21

## 2021-01-20 RX ORDER — INSULIN LISPRO 100/ML
VIAL (ML) SUBCUTANEOUS
Refills: 0 | Status: DISCONTINUED | OUTPATIENT
Start: 2021-01-20 | End: 2021-02-02

## 2021-01-20 RX ORDER — INSULIN LISPRO 100/ML
VIAL (ML) SUBCUTANEOUS AT BEDTIME
Refills: 0 | Status: DISCONTINUED | OUTPATIENT
Start: 2021-01-20 | End: 2021-01-28

## 2021-01-20 RX ORDER — METFORMIN HYDROCHLORIDE 850 MG/1
500 TABLET ORAL
Refills: 0 | Status: DISCONTINUED | OUTPATIENT
Start: 2021-01-20 | End: 2021-01-23

## 2021-01-20 RX ORDER — INSULIN LISPRO 100/ML
3 VIAL (ML) SUBCUTANEOUS
Refills: 0 | Status: DISCONTINUED | OUTPATIENT
Start: 2021-01-20 | End: 2021-01-20

## 2021-01-20 RX ORDER — INSULIN GLARGINE 100 [IU]/ML
10 INJECTION, SOLUTION SUBCUTANEOUS AT BEDTIME
Refills: 0 | Status: DISCONTINUED | OUTPATIENT
Start: 2021-01-20 | End: 2021-01-23

## 2021-01-20 RX ADMIN — SENNA PLUS 2 TABLET(S): 8.6 TABLET ORAL at 22:11

## 2021-01-20 RX ADMIN — Medication 1: at 22:11

## 2021-01-20 RX ADMIN — POLYETHYLENE GLYCOL 3350 17 GRAM(S): 17 POWDER, FOR SOLUTION ORAL at 17:16

## 2021-01-20 RX ADMIN — INSULIN GLARGINE 10 UNIT(S): 100 INJECTION, SOLUTION SUBCUTANEOUS at 22:10

## 2021-01-20 RX ADMIN — CLOPIDOGREL BISULFATE 75 MILLIGRAM(S): 75 TABLET, FILM COATED ORAL at 11:52

## 2021-01-20 RX ADMIN — GABAPENTIN 200 MILLIGRAM(S): 400 CAPSULE ORAL at 22:11

## 2021-01-20 RX ADMIN — AMLODIPINE BESYLATE 5 MILLIGRAM(S): 2.5 TABLET ORAL at 05:19

## 2021-01-20 RX ADMIN — Medication 3: at 17:16

## 2021-01-20 RX ADMIN — METFORMIN HYDROCHLORIDE 500 MILLIGRAM(S): 850 TABLET ORAL at 17:16

## 2021-01-20 RX ADMIN — GABAPENTIN 200 MILLIGRAM(S): 400 CAPSULE ORAL at 14:31

## 2021-01-20 RX ADMIN — Medication 8: at 07:38

## 2021-01-20 RX ADMIN — PANTOPRAZOLE SODIUM 40 MILLIGRAM(S): 20 TABLET, DELAYED RELEASE ORAL at 05:19

## 2021-01-20 RX ADMIN — GABAPENTIN 100 MILLIGRAM(S): 400 CAPSULE ORAL at 05:19

## 2021-01-20 RX ADMIN — ATORVASTATIN CALCIUM 80 MILLIGRAM(S): 80 TABLET, FILM COATED ORAL at 22:11

## 2021-01-20 RX ADMIN — ENOXAPARIN SODIUM 40 MILLIGRAM(S): 100 INJECTION SUBCUTANEOUS at 22:11

## 2021-01-20 RX ADMIN — Medication 16: at 11:52

## 2021-01-20 RX ADMIN — POLYETHYLENE GLYCOL 3350 17 GRAM(S): 17 POWDER, FOR SOLUTION ORAL at 05:19

## 2021-01-20 RX ADMIN — DONEPEZIL HYDROCHLORIDE 10 MILLIGRAM(S): 10 TABLET, FILM COATED ORAL at 22:10

## 2021-01-20 RX ADMIN — Medication 81 MILLIGRAM(S): at 11:51

## 2021-01-20 NOTE — DIETITIAN INITIAL EVALUATION ADULT. - ORAL INTAKE PTA/DIET HISTORY
Pt reports good appetite PTA. States that she always eats rice as a staple at most meals. Her  has been encouraging her to eat smaller starch portions to improve glycemic control. Denies chewing/swallowing difficulty. NKFA. States that she was drinking Ensure PTA as a meal replacement.

## 2021-01-20 NOTE — CONSULT NOTE ADULT - ASSESSMENT
65 year old RH past medical history of HTN, DM2, HLD, peripheral artery stents, memory loss who presents with inability to ambulate and lower extremity weakness due to transverse myeiltis with abnormal signal at T10 on MRI s/p steroid therapy  with Gait Instability, ADL impairments and Functional impairments- pt/ot/dvt ppx, pain meds    #Mood  - on Aricept    #HTN- amlodipine    #HLD- c/w statin    #CAD with stent  - ASA/ plavix    #DM2  - will start Lantus 10 u, pre meal 3 u tid with meals, ISS, Accu-Cheks  - hyperglycemia likely due to recent steroid use, three day course of methylprednisolone (1/15-1/17).     #Pain control: Tylenol PRN, gabapentin    # DVT: Lovenox    will follow  d/w dr. ardon 65 year old RH past medical history of HTN, DM2, HLD, peripheral artery stents, memory loss who presents with inability to ambulate and lower extremity weakness due to transverse myeiltis with abnormal signal at T10 on MRI s/p steroid therapy  with Gait Instability, ADL impairments and Functional impairments- pt/ot/dvt ppx, pain meds    #Mood  - on Aricept    #HTN- amlodipine    #HLD- c/w statin    #CAD with stent  - ASA/ plavix    #DM2  - will start Lantus 10 u, ISS, Accu-Cheks  - start metformin 500 bid with meals, reassess in am  - hyperglycemia likely due to recent steroid use, three day course of methylprednisolone (1/15-1/17).     #Pain control: Tylenol PRN, gabapentin    # DVT: Lovenox    will follow  d/w dr. ardon

## 2021-01-20 NOTE — DIETITIAN INITIAL EVALUATION ADULT. - OTHER INFO
Pt noted with good appetite. Observed >75% of lunch consumed today. Denies any recent weight changes. UBW 138lbs. Requesting Ensure supplement; advised to try Glucerna 1x/day instead, but not as meal replacement. Reviewed consistent carbohydrate diet for improved glycemic control; A1c 8.9%, glucose 278-403mg/dl. Pt states that she was not SMBG PTA because she did not know how to use her machine. Pt will require education prior to d/c (has glucometer with her). Carbohydrate counting/myplate handout provided in [place]ine[place]. Pt receptive to education and motivated to make positive changes. Menu provided, preferences obtained and ordering procedures reviewed. Denies GI distress, last BM 1/19.

## 2021-01-20 NOTE — CONSULT NOTE ADULT - SUBJECTIVE AND OBJECTIVE BOX
65 year old RH Setswana speaking female with a past medical history of HTN, DM2, HLD, peripheral artery stents, and memory loss who presented to [facility] on 1/11 with inability to ambulate and lower extremity weakness. At baseline, the patient states that she is able to ambulate without any assistive devices but does so slowly. Approximately 1 week PTA she began to feel a pain in her lower abdomen that wrapped around her stomach and radiated to her back bilaterally at approximately the belly button level. She stated that the pain was uncomfortable but was not associated with any weakness and she was still able to perform her daily activities. Did not have any nausea/vomiting at that time. Pain was present for several days and then 3 days PTA she noted that she began to have lower extremity weakness which was worse in her RLE. Over that time, she noted that it was difficult for her to get up and ambulate and she required assistance from her family in order to walk. She then presented to the ED (1/11) for further evaluation. Patient notes that she feels that she has reduced sensation in her lower extremities. Denies head trauma. Denies any symptoms in her upper extremities, or face. Denies headaches, dizziness, lightheadedness, dysphagia, dysarthria, difficulty expressing herself. Denies any recent illnesses, sick contacts, animal exposure or bites. States that she received a flu vaccine in November and has not had any vaccinations since then. No exposure to COVID-19.     Patient received CT head (microvascular disease volume loss) and MR lumbosacral spine concerning for abnormal signal intensity at T9-T11, prompting further spinal imaging and lumbar puncture to rule out other etiologies for abnormal signal intensity. Patient arrived to the floor in stable condition. MR cervical and thoracic spine revealed  hocus of abnormal signal at T10 that does not enhance. CSF studies showed 6 cells, protein of 78, negative PCR, and negative flow cytometry. NMO and MOG serum studies were sent. Patient was treated with a three day course of methylprednisolone (1/15-1/17).   Patient was deemed medically stable and discharged to [facility] on 1/19.     seen at the bedside, c/o 5/10 constant, aching back pain, associated with tingling and numbess in the back and legs, no n/v, no sob.  no dysuria, no dizziness.        Review of Systems: per hpi        Allergies and Intolerances:        Allergies:  	No Known Allergies:     Home Medications:   * Patient Currently Takes Medications as of 19-Jan-2021 11:29 documented in Structured Notes  · 	senna oral tablet: 2 tab(s) orally once a day (at bedtime)  · 	Plavix 75 mg oral tablet: 1 tab(s) orally once a day  · 	glipiZIDE 10 mg oral tablet: 1 tab(s) orally once a day  · 	gabapentin 100 mg oral capsule: 1 cap(s) orally 3 times a day  · 	aspirin 81 mg oral tablet, chewable: 1 tab(s) orally once a day  · 	donepezil 10 mg oral tablet: 1 tab(s) orally once a day (at bedtime)  · 	metFORMIN 1000 mg oral tablet: 1 tab(s) orally 2 times a day  · 	amLODIPine 5 mg oral tablet: 1 tab(s) orally once a day  · 	Lipitor 80 mg oral tablet: 1 tab(s) orally once a day  · 	Vascepa 1 g oral capsule: 2 cap(s) orally 2 times a day    .    Patient History:    Past Medical, Past Surgical, and Family History:  PAST MEDICAL HISTORY:  DM (diabetes mellitus)     HTN (hypertension)     Memory loss     PAD (peripheral artery disease).     PAST SURGICAL HISTORY:  S/P peripheral artery angioplasty with stent placement.     FAMILY HISTORY:  No pertinent family history in first degree relatives.     No Pertinent Family History in first degree relatives of: na.     Social History:    Smoking - Denied  EtOH - Denied   Drugs - Denied       Vital Signs Last 24 Hrs  T(C): 36.4 (20 Jan 2021 09:34), Max: 36.5 (19 Jan 2021 19:56)  T(F): 97.6 (20 Jan 2021 09:34), Max: 97.7 (19 Jan 2021 19:56)  HR: 61 (20 Jan 2021 08:55) (61 - 70)  BP: 135/83 (20 Jan 2021 08:55) (119/71 - 153/92)  BP(mean): --  RR: 18 (20 Jan 2021 08:55) (16 - 18)  SpO2: 100% (20 Jan 2021 08:55) (97% - 100%)      GENERAL- NAD  EAR/NOSE/MOUTH/THROAT - no pharyngeal exudates, no oral leisions,  MMM  EYES- CHRYSTAL, conjunctiva and Sclera clear  NECK- supple  RESPIRATORY-  clear to auscultation bilaterally  CARDIOVASCULAR - SIS2, RRR  GI - soft NT BS present  EXTREMITIES- no pedal edema  NEUROLOGY- right LE weakness  SKIN- no rashes, warm to touch  PSYCHIATRY- AAO X 3  MUSCULOSKELETAL- PROM normal                  12.3                 136  | 29   | 32           13.66 >-----------< 257     ------------------------< 261                   36.8                 4.9  | 102  | 0.90                                         Ca 8.8   Mg x     Ph x          Labs reviewed:     CXR personally reviewed: Clear lung fields bilaterally  < from: Xray Abdomen 1 View PORTABLE -Urgent (Xray Abdomen 1 View PORTABLE -Urgent .) (01.19.21 @ 20:53) >    FINDINGS:  Fecal material is identified within the right colon. No significant fecal burden is identified within the left colon or rectum to suggest fecal rectal impaction. Nonspecific bowel gas pattern. No free intraperitoneal air is identified. No acute osseous fractures identified. Vascular stent graft material is identified overlying the soft tissues of the proximal left thigh.    < end of copied text >    CAPILLARY BLOOD GLUCOSE      POCT Blood Glucose.: 244 mg/dL (20 Jan 2021 07:08)  POCT Blood Glucose.: 278 mg/dL (19 Jan 2021 21:40)  POCT Blood Glucose.: 333 mg/dL (19 Jan 2021 17:41)  POCT Blood Glucose.: 403 mg/dL (19 Jan 2021 12:11)      A1C with Estimated Average Glucose Result: 8.9 % (01.12.21 @ 11:46)      ECG reviewed and interpreted: < from: 12 Lead ECG (01.11.21 @ 22:36) >  Ventricular Rate 63 BPM    Atrial Rate 63 BPM    P-R Interval 170 ms    QRS Duration 88 ms    Q-T Interval 430 ms    QTC Calculation(Bazett) 440 ms    P Axis 55 degrees    R Axis 62 degrees    T Axis 72 degrees    Diagnosis Line NORMAL SINUS RHYTHM  NORMAL ECG  NO PREVIOUS ECGS AVAILABLE    < end of copied text >      < from: MR Lumbar Spine w/wo IV Cont (01.12.21 @ 12:39) >    IMPRESSION: Focus of abnormal signal within the cord at T10 does not enhance with contrast. Differential diagnosis includes infectious, inflammatory and demyelinating processes.    < end of copied text >      MEDICATIONS  (STANDING):  amLODIPine   Tablet 5 milliGRAM(s) Oral daily  aspirin  chewable 81 milliGRAM(s) Oral daily  atorvastatin 80 milliGRAM(s) Oral at bedtime  clopidogrel Tablet 75 milliGRAM(s) Oral daily  donepezil 10 milliGRAM(s) Oral at bedtime  enoxaparin Injectable 40 milliGRAM(s) SubCutaneous at bedtime  gabapentin 100 milliGRAM(s) Oral three times a day  glucagon  Injectable 1 milliGRAM(s) IntraMuscular once  insulin lispro (ADMELOG) corrective regimen sliding scale   SubCutaneous three times a day before meals  insulin lispro (ADMELOG) corrective regimen sliding scale   SubCutaneous at bedtime  pantoprazole    Tablet 40 milliGRAM(s) Oral before breakfast  polyethylene glycol 3350 17 Gram(s) Oral two times a day  senna 2 Tablet(s) Oral at bedtime    MEDICATIONS  (PRN):  acetaminophen   Tablet .. 650 milliGRAM(s) Oral every 6 hours PRN Temp greater or equal to 38C (100.4F), Mild Pain (1 - 3)  bisacodyl Suppository 10 milliGRAM(s) Rectal daily PRN Constipation

## 2021-01-20 NOTE — DIETITIAN INITIAL EVALUATION ADULT. - PERSON TAUGHT/METHOD
Consistent carbohydrate (Placemat in [place]ayan)/verbal instruction/written material/patient instructed/teach back - (Patient repeats in own words)

## 2021-01-21 LAB
ALBUMIN SERPL ELPH-MCNC: 2.9 G/DL — LOW (ref 3.3–5)
ALP SERPL-CCNC: 97 U/L — SIGNIFICANT CHANGE UP (ref 40–120)
ALT FLD-CCNC: 45 U/L — SIGNIFICANT CHANGE UP (ref 10–45)
ANION GAP SERPL CALC-SCNC: 4 MMOL/L — LOW (ref 5–17)
AST SERPL-CCNC: 15 U/L — SIGNIFICANT CHANGE UP (ref 10–40)
BASOPHILS # BLD AUTO: 0.01 K/UL — SIGNIFICANT CHANGE UP (ref 0–0.2)
BASOPHILS NFR BLD AUTO: 0.1 % — SIGNIFICANT CHANGE UP (ref 0–2)
BILIRUB SERPL-MCNC: 0.4 MG/DL — SIGNIFICANT CHANGE UP (ref 0.2–1.2)
BUN SERPL-MCNC: 26 MG/DL — HIGH (ref 7–23)
CALCIUM SERPL-MCNC: 9.1 MG/DL — SIGNIFICANT CHANGE UP (ref 8.4–10.5)
CHLORIDE SERPL-SCNC: 104 MMOL/L — SIGNIFICANT CHANGE UP (ref 96–108)
CO2 SERPL-SCNC: 31 MMOL/L — SIGNIFICANT CHANGE UP (ref 22–31)
CREAT SERPL-MCNC: 0.73 MG/DL — SIGNIFICANT CHANGE UP (ref 0.5–1.3)
EOSINOPHIL # BLD AUTO: 0.13 K/UL — SIGNIFICANT CHANGE UP (ref 0–0.5)
EOSINOPHIL NFR BLD AUTO: 1.2 % — SIGNIFICANT CHANGE UP (ref 0–6)
GLUCOSE BLDC GLUCOMTR-MCNC: 138 MG/DL — HIGH (ref 70–99)
GLUCOSE BLDC GLUCOMTR-MCNC: 179 MG/DL — HIGH (ref 70–99)
GLUCOSE BLDC GLUCOMTR-MCNC: 198 MG/DL — HIGH (ref 70–99)
GLUCOSE BLDC GLUCOMTR-MCNC: 214 MG/DL — HIGH (ref 70–99)
GLUCOSE BLDC GLUCOMTR-MCNC: 255 MG/DL — HIGH (ref 70–99)
GLUCOSE SERPL-MCNC: 160 MG/DL — HIGH (ref 70–99)
HCT VFR BLD CALC: 41.8 % — SIGNIFICANT CHANGE UP (ref 34.5–45)
HGB BLD-MCNC: 13.6 G/DL — SIGNIFICANT CHANGE UP (ref 11.5–15.5)
IMM GRANULOCYTES NFR BLD AUTO: 0.6 % — SIGNIFICANT CHANGE UP (ref 0–1.5)
LYMPHOCYTES # BLD AUTO: 4.55 K/UL — HIGH (ref 1–3.3)
LYMPHOCYTES # BLD AUTO: 42.7 % — SIGNIFICANT CHANGE UP (ref 13–44)
MCHC RBC-ENTMCNC: 30.6 PG — SIGNIFICANT CHANGE UP (ref 27–34)
MCHC RBC-ENTMCNC: 32.5 GM/DL — SIGNIFICANT CHANGE UP (ref 32–36)
MCV RBC AUTO: 93.9 FL — SIGNIFICANT CHANGE UP (ref 80–100)
MONOCYTES # BLD AUTO: 0.75 K/UL — SIGNIFICANT CHANGE UP (ref 0–0.9)
MONOCYTES NFR BLD AUTO: 7 % — SIGNIFICANT CHANGE UP (ref 2–14)
NEUTROPHILS # BLD AUTO: 5.15 K/UL — SIGNIFICANT CHANGE UP (ref 1.8–7.4)
NEUTROPHILS NFR BLD AUTO: 48.4 % — SIGNIFICANT CHANGE UP (ref 43–77)
NRBC # BLD: 0 /100 WBCS — SIGNIFICANT CHANGE UP (ref 0–0)
PLATELET # BLD AUTO: 257 K/UL — SIGNIFICANT CHANGE UP (ref 150–400)
POTASSIUM SERPL-MCNC: 4.9 MMOL/L — SIGNIFICANT CHANGE UP (ref 3.5–5.3)
POTASSIUM SERPL-SCNC: 4.9 MMOL/L — SIGNIFICANT CHANGE UP (ref 3.5–5.3)
PROT SERPL-MCNC: 6.1 G/DL — SIGNIFICANT CHANGE UP (ref 6–8.3)
RBC # BLD: 4.45 M/UL — SIGNIFICANT CHANGE UP (ref 3.8–5.2)
RBC # FLD: 14.1 % — SIGNIFICANT CHANGE UP (ref 10.3–14.5)
SODIUM SERPL-SCNC: 139 MMOL/L — SIGNIFICANT CHANGE UP (ref 135–145)
WBC # BLD: 10.65 K/UL — HIGH (ref 3.8–10.5)
WBC # FLD AUTO: 10.65 K/UL — HIGH (ref 3.8–10.5)

## 2021-01-21 PROCEDURE — 99232 SBSQ HOSP IP/OBS MODERATE 35: CPT

## 2021-01-21 PROCEDURE — 99233 SBSQ HOSP IP/OBS HIGH 50: CPT

## 2021-01-21 RX ORDER — GABAPENTIN 400 MG/1
300 CAPSULE ORAL THREE TIMES A DAY
Refills: 0 | Status: DISCONTINUED | OUTPATIENT
Start: 2021-01-21 | End: 2021-01-26

## 2021-01-21 RX ORDER — LACTULOSE 10 G/15ML
10 SOLUTION ORAL ONCE
Refills: 0 | Status: COMPLETED | OUTPATIENT
Start: 2021-01-21 | End: 2021-01-21

## 2021-01-21 RX ADMIN — POLYETHYLENE GLYCOL 3350 17 GRAM(S): 17 POWDER, FOR SOLUTION ORAL at 17:01

## 2021-01-21 RX ADMIN — GABAPENTIN 200 MILLIGRAM(S): 400 CAPSULE ORAL at 12:19

## 2021-01-21 RX ADMIN — PANTOPRAZOLE SODIUM 40 MILLIGRAM(S): 20 TABLET, DELAYED RELEASE ORAL at 05:34

## 2021-01-21 RX ADMIN — ATORVASTATIN CALCIUM 80 MILLIGRAM(S): 80 TABLET, FILM COATED ORAL at 21:56

## 2021-01-21 RX ADMIN — Medication 3: at 11:24

## 2021-01-21 RX ADMIN — DONEPEZIL HYDROCHLORIDE 10 MILLIGRAM(S): 10 TABLET, FILM COATED ORAL at 21:56

## 2021-01-21 RX ADMIN — CLOPIDOGREL BISULFATE 75 MILLIGRAM(S): 75 TABLET, FILM COATED ORAL at 12:19

## 2021-01-21 RX ADMIN — Medication 1: at 17:01

## 2021-01-21 RX ADMIN — GABAPENTIN 300 MILLIGRAM(S): 400 CAPSULE ORAL at 21:56

## 2021-01-21 RX ADMIN — AMLODIPINE BESYLATE 5 MILLIGRAM(S): 2.5 TABLET ORAL at 05:34

## 2021-01-21 RX ADMIN — INSULIN GLARGINE 10 UNIT(S): 100 INJECTION, SOLUTION SUBCUTANEOUS at 21:55

## 2021-01-21 RX ADMIN — LACTULOSE 10 GRAM(S): 10 SOLUTION ORAL at 13:06

## 2021-01-21 RX ADMIN — GABAPENTIN 200 MILLIGRAM(S): 400 CAPSULE ORAL at 05:34

## 2021-01-21 RX ADMIN — METFORMIN HYDROCHLORIDE 500 MILLIGRAM(S): 850 TABLET ORAL at 17:01

## 2021-01-21 RX ADMIN — SENNA PLUS 2 TABLET(S): 8.6 TABLET ORAL at 21:56

## 2021-01-21 RX ADMIN — POLYETHYLENE GLYCOL 3350 17 GRAM(S): 17 POWDER, FOR SOLUTION ORAL at 05:35

## 2021-01-21 RX ADMIN — ENOXAPARIN SODIUM 40 MILLIGRAM(S): 100 INJECTION SUBCUTANEOUS at 21:56

## 2021-01-21 RX ADMIN — Medication 81 MILLIGRAM(S): at 12:19

## 2021-01-21 RX ADMIN — METFORMIN HYDROCHLORIDE 500 MILLIGRAM(S): 850 TABLET ORAL at 07:39

## 2021-01-21 NOTE — PROVIDER CONTACT NOTE (OTHER) - ACTION/TREATMENT ORDERED:
Pt returned to bed with max assist V/S taken Dr. Alvarez made aware No further dizziness at present Will continue to follow

## 2021-01-21 NOTE — PROGRESS NOTE ADULT - ASSESSMENT
65 year old RH past medical history of HTN, DM2, HLD, peripheral artery stents, memory loss who presents with inability to ambulate and lower extremity weakness due to transverse myeiltis with abnormal signal at T10 on MRI s/p steroid therapy  with Gait Instability, ADL impairments and Functional impairments- pt/ot/dvt ppx, pain meds    #Mood -on Aricept    #HTN- amlodipine    #HLD- c/w statin    #CAD with stent  - ASA/ plavix    #DM2  - will started  Lantus 10 u on 1/20/21, ISS, Accu-Cheks  - started on metformin 500 bid 1/20/21 with meals, reassess in am  - hyperglycemia likely due to recent steroid use, three day course of methylprednisolone (1/15-1/17).     #Pain control: Tylenol PRN, gabapentin    # DVT: Lovenox    will follow  d/w dr. ardon

## 2021-01-21 NOTE — PROVIDER CONTACT NOTE (OTHER) - SITUATION
pt C/O feeling Dizzy and light headed after attempting to have BM in BR No C/O sob or chest pain No diaphoresis V/S as charted F.S 214

## 2021-01-21 NOTE — PROGRESS NOTE ADULT - ASSESSMENT
· Assessment	  ASSESSMENT/PLAN  65 year old RH past medical history of HTN, DM, HLD, peripheral artery stents, memory loss who presents with inability to ambulate and lower extremity weakness due to transverse myeiltis with abnormal signal at T10 on MRI s/p steroid therapy  with Gait Instability, ADL impairments and Functional impairments.    COMORBIDITES/ACTIVE MEDICAL ISSUES     Gait Instability, ADL impairments and Functional impairments: start Comprehensive Rehab Program of PT/OT     #Transverse Myelitis (T10)  - s/p methylprednisolone therapy x 3 days, likely cause of leukocytosis  - f/u NMO ab  - Bowel Mgmt -  constipation reports last bm was on 1/11.  c/w  Senna, start miralax, enema today, dulc suppository prn, abdominal xray. Daily suppository for one week.   - GI ppx with PPI  - Bladder: PVR q 8 hours (SC if > 400)    #Mood  - on aricept    #HTN/HLD/PAD with stent  - ASA/ plavix  - norvasc  - statin     #DM  -likely increased ISS scale/ BG due to recent steroid use  - will taper during course  - ISS    -Pain control: Tylenol PRN, gabapentin. Increase gabapentin to 300 TID.   - DVT: Lovenox, SCDs, TEDs     FEN   - Diet - Regular + Thins  [CCHO, DASH/TLC]      Precautions / PROPHYLAXIS:   - Falls  - ortho: Weight bearing status: full  - Lungs: Aspiration, Incentive Spirometer   - Pressure injury/Skin: Turn Q2hrs while in bed, OOB to Chair, PT/OT

## 2021-01-22 LAB
GANGLIOSIDE GQ1B IGG ANTIBODY RESULT: SIGNIFICANT CHANGE UP TITER
GLUCOSE BLDC GLUCOMTR-MCNC: 123 MG/DL — HIGH (ref 70–99)
GLUCOSE BLDC GLUCOMTR-MCNC: 144 MG/DL — HIGH (ref 70–99)
GLUCOSE BLDC GLUCOMTR-MCNC: 182 MG/DL — HIGH (ref 70–99)
GLUCOSE BLDC GLUCOMTR-MCNC: 290 MG/DL — HIGH (ref 70–99)

## 2021-01-22 PROCEDURE — 99232 SBSQ HOSP IP/OBS MODERATE 35: CPT

## 2021-01-22 RX ADMIN — METFORMIN HYDROCHLORIDE 500 MILLIGRAM(S): 850 TABLET ORAL at 08:13

## 2021-01-22 RX ADMIN — METFORMIN HYDROCHLORIDE 500 MILLIGRAM(S): 850 TABLET ORAL at 17:15

## 2021-01-22 RX ADMIN — DONEPEZIL HYDROCHLORIDE 10 MILLIGRAM(S): 10 TABLET, FILM COATED ORAL at 21:12

## 2021-01-22 RX ADMIN — ATORVASTATIN CALCIUM 80 MILLIGRAM(S): 80 TABLET, FILM COATED ORAL at 21:12

## 2021-01-22 RX ADMIN — GABAPENTIN 300 MILLIGRAM(S): 400 CAPSULE ORAL at 05:05

## 2021-01-22 RX ADMIN — GABAPENTIN 300 MILLIGRAM(S): 400 CAPSULE ORAL at 21:31

## 2021-01-22 RX ADMIN — GABAPENTIN 300 MILLIGRAM(S): 400 CAPSULE ORAL at 13:32

## 2021-01-22 RX ADMIN — SENNA PLUS 2 TABLET(S): 8.6 TABLET ORAL at 21:12

## 2021-01-22 RX ADMIN — POLYETHYLENE GLYCOL 3350 17 GRAM(S): 17 POWDER, FOR SOLUTION ORAL at 05:05

## 2021-01-22 RX ADMIN — INSULIN GLARGINE 10 UNIT(S): 100 INJECTION, SOLUTION SUBCUTANEOUS at 21:12

## 2021-01-22 RX ADMIN — PANTOPRAZOLE SODIUM 40 MILLIGRAM(S): 20 TABLET, DELAYED RELEASE ORAL at 05:05

## 2021-01-22 RX ADMIN — Medication 81 MILLIGRAM(S): at 12:25

## 2021-01-22 RX ADMIN — ENOXAPARIN SODIUM 40 MILLIGRAM(S): 100 INJECTION SUBCUTANEOUS at 21:12

## 2021-01-22 RX ADMIN — Medication 3: at 12:25

## 2021-01-22 RX ADMIN — CLOPIDOGREL BISULFATE 75 MILLIGRAM(S): 75 TABLET, FILM COATED ORAL at 12:25

## 2021-01-22 RX ADMIN — AMLODIPINE BESYLATE 5 MILLIGRAM(S): 2.5 TABLET ORAL at 05:05

## 2021-01-23 LAB
GLUCOSE BLDC GLUCOMTR-MCNC: 132 MG/DL — HIGH (ref 70–99)
GLUCOSE BLDC GLUCOMTR-MCNC: 155 MG/DL — HIGH (ref 70–99)
GLUCOSE BLDC GLUCOMTR-MCNC: 156 MG/DL — HIGH (ref 70–99)
GLUCOSE BLDC GLUCOMTR-MCNC: 210 MG/DL — HIGH (ref 70–99)

## 2021-01-23 PROCEDURE — 99233 SBSQ HOSP IP/OBS HIGH 50: CPT

## 2021-01-23 PROCEDURE — 99232 SBSQ HOSP IP/OBS MODERATE 35: CPT

## 2021-01-23 RX ORDER — INSULIN GLARGINE 100 [IU]/ML
7 INJECTION, SOLUTION SUBCUTANEOUS AT BEDTIME
Refills: 0 | Status: DISCONTINUED | OUTPATIENT
Start: 2021-01-23 | End: 2021-01-24

## 2021-01-23 RX ORDER — METFORMIN HYDROCHLORIDE 850 MG/1
850 TABLET ORAL
Refills: 0 | Status: DISCONTINUED | OUTPATIENT
Start: 2021-01-23 | End: 2021-01-25

## 2021-01-23 RX ADMIN — GABAPENTIN 300 MILLIGRAM(S): 400 CAPSULE ORAL at 05:17

## 2021-01-23 RX ADMIN — GABAPENTIN 300 MILLIGRAM(S): 400 CAPSULE ORAL at 21:35

## 2021-01-23 RX ADMIN — METFORMIN HYDROCHLORIDE 850 MILLIGRAM(S): 850 TABLET ORAL at 17:16

## 2021-01-23 RX ADMIN — CLOPIDOGREL BISULFATE 75 MILLIGRAM(S): 75 TABLET, FILM COATED ORAL at 12:16

## 2021-01-23 RX ADMIN — Medication 81 MILLIGRAM(S): at 12:17

## 2021-01-23 RX ADMIN — AMLODIPINE BESYLATE 5 MILLIGRAM(S): 2.5 TABLET ORAL at 05:17

## 2021-01-23 RX ADMIN — Medication 2: at 17:16

## 2021-01-23 RX ADMIN — ENOXAPARIN SODIUM 40 MILLIGRAM(S): 100 INJECTION SUBCUTANEOUS at 21:34

## 2021-01-23 RX ADMIN — PANTOPRAZOLE SODIUM 40 MILLIGRAM(S): 20 TABLET, DELAYED RELEASE ORAL at 05:17

## 2021-01-23 RX ADMIN — ATORVASTATIN CALCIUM 80 MILLIGRAM(S): 80 TABLET, FILM COATED ORAL at 21:35

## 2021-01-23 RX ADMIN — GABAPENTIN 300 MILLIGRAM(S): 400 CAPSULE ORAL at 14:01

## 2021-01-23 RX ADMIN — DONEPEZIL HYDROCHLORIDE 10 MILLIGRAM(S): 10 TABLET, FILM COATED ORAL at 21:35

## 2021-01-23 RX ADMIN — Medication 1: at 08:01

## 2021-01-23 RX ADMIN — POLYETHYLENE GLYCOL 3350 17 GRAM(S): 17 POWDER, FOR SOLUTION ORAL at 05:17

## 2021-01-23 RX ADMIN — INSULIN GLARGINE 7 UNIT(S): 100 INJECTION, SOLUTION SUBCUTANEOUS at 22:11

## 2021-01-23 RX ADMIN — SENNA PLUS 2 TABLET(S): 8.6 TABLET ORAL at 21:35

## 2021-01-23 RX ADMIN — METFORMIN HYDROCHLORIDE 500 MILLIGRAM(S): 850 TABLET ORAL at 08:02

## 2021-01-23 NOTE — PROGRESS NOTE ADULT - ASSESSMENT
65 year old female with inability to ambulate and lower extremity weakness possibly  due to transverse myeiltis with abnormal signal at T10 on MRI s/p steroid therapy      Continue rehab program:PT/OT 3 hrs/day 5 days/week     DVT prophylaxis: on Lovenox    Pain management: on gabapentin, tylenol prn    DM-II: On Lantus, SSI, metformin    h/o memory impairments: on donepezil    Cardiac/PAD: on ASA, Plavix, statins    HTN: on amlodipine    Bowel program: on suppository, senna hs     Hospitalist fu noted

## 2021-01-23 NOTE — PROGRESS NOTE ADULT - ASSESSMENT
65 year old RH past medical history of HTN, DM2, HLD, peripheral artery stents, memory loss who presents with inability to ambulate and lower extremity weakness due to transverse myelitis with abnormal signal at T10 on MRI s/p steroid therapy  with Gait Instability, ADL impairments and Functional impairments- pt/ot/dvt ppx, pain meds    # leukocytosis- trending down likely secondary to steroids    #Mood -on Aricept    #HTN-  amlodipine     #HLD- c/w statin    #CAD with stent  - ASA/ plavix    #DM2  - will started  Lantus 10 u on 1/20/21, ISS, Accu-Cheks  - started on metformin 500 bid 1/20/21 with meals, reassess in am  - hyperglycemia likely due to recent steroid use, three day course of methylprednisolone (1/15-1/17).     #Pain control: Tylenol PRN, gabapentin    # DVT: Lovenox    will follow  d/w dr. ardon

## 2021-01-24 LAB
GLUCOSE BLDC GLUCOMTR-MCNC: 117 MG/DL — HIGH (ref 70–99)
GLUCOSE BLDC GLUCOMTR-MCNC: 132 MG/DL — HIGH (ref 70–99)
GLUCOSE BLDC GLUCOMTR-MCNC: 164 MG/DL — HIGH (ref 70–99)
GLUCOSE BLDC GLUCOMTR-MCNC: 84 MG/DL — SIGNIFICANT CHANGE UP (ref 70–99)

## 2021-01-24 PROCEDURE — 99232 SBSQ HOSP IP/OBS MODERATE 35: CPT

## 2021-01-24 PROCEDURE — 99233 SBSQ HOSP IP/OBS HIGH 50: CPT

## 2021-01-24 RX ADMIN — Medication 81 MILLIGRAM(S): at 12:37

## 2021-01-24 RX ADMIN — POLYETHYLENE GLYCOL 3350 17 GRAM(S): 17 POWDER, FOR SOLUTION ORAL at 05:37

## 2021-01-24 RX ADMIN — ENOXAPARIN SODIUM 40 MILLIGRAM(S): 100 INJECTION SUBCUTANEOUS at 21:21

## 2021-01-24 RX ADMIN — METFORMIN HYDROCHLORIDE 850 MILLIGRAM(S): 850 TABLET ORAL at 07:51

## 2021-01-24 RX ADMIN — GABAPENTIN 300 MILLIGRAM(S): 400 CAPSULE ORAL at 05:37

## 2021-01-24 RX ADMIN — AMLODIPINE BESYLATE 5 MILLIGRAM(S): 2.5 TABLET ORAL at 05:37

## 2021-01-24 RX ADMIN — GABAPENTIN 300 MILLIGRAM(S): 400 CAPSULE ORAL at 21:21

## 2021-01-24 RX ADMIN — GABAPENTIN 300 MILLIGRAM(S): 400 CAPSULE ORAL at 12:37

## 2021-01-24 RX ADMIN — POLYETHYLENE GLYCOL 3350 17 GRAM(S): 17 POWDER, FOR SOLUTION ORAL at 18:04

## 2021-01-24 RX ADMIN — PANTOPRAZOLE SODIUM 40 MILLIGRAM(S): 20 TABLET, DELAYED RELEASE ORAL at 05:37

## 2021-01-24 RX ADMIN — DONEPEZIL HYDROCHLORIDE 10 MILLIGRAM(S): 10 TABLET, FILM COATED ORAL at 21:21

## 2021-01-24 RX ADMIN — METFORMIN HYDROCHLORIDE 850 MILLIGRAM(S): 850 TABLET ORAL at 18:02

## 2021-01-24 RX ADMIN — CLOPIDOGREL BISULFATE 75 MILLIGRAM(S): 75 TABLET, FILM COATED ORAL at 12:37

## 2021-01-24 RX ADMIN — SENNA PLUS 2 TABLET(S): 8.6 TABLET ORAL at 21:21

## 2021-01-24 RX ADMIN — ATORVASTATIN CALCIUM 80 MILLIGRAM(S): 80 TABLET, FILM COATED ORAL at 21:21

## 2021-01-24 NOTE — PROGRESS NOTE ADULT - ASSESSMENT
65 year old RH past medical history of HTN, DM2, HLD, peripheral artery stents, memory loss who presents with inability to ambulate and lower extremity weakness due to transverse myelitis with abnormal signal at T10 on MRI s/p steroid therapy  with Gait Instability, ADL impairments and Functional impairments- pt/ot/dvt ppx, pain meds    # leukocytosis- trending down likely secondary to steroids    #Mood -on Aricept    #HTN-  amlodipine     #HLD- c/w statin    #CAD with stent  - ASA/ plavix    #DM2  - will d/c Lantus,  ISS, Accu-Cheks  - started on metformin 500 bid 1/20/21 with meals, increased to 850 bid 1/24/21, reassess in am  - s/p  three day course of methylprednisolone (1/15-1/17). FS improved now  - A1C with Estimated Average Glucose Result: 8.9% (01.12.21 @ 11:46)      #Pain control: Tylenol PRN, gabapentin    # DVT: Lovenox    will follow  d/w dr. ardon

## 2021-01-25 LAB
ALBUMIN SERPL ELPH-MCNC: 2.8 G/DL — LOW (ref 3.3–5)
ALP SERPL-CCNC: 95 U/L — SIGNIFICANT CHANGE UP (ref 40–120)
ALT FLD-CCNC: 44 U/L — SIGNIFICANT CHANGE UP (ref 10–45)
ANION GAP SERPL CALC-SCNC: 8 MMOL/L — SIGNIFICANT CHANGE UP (ref 5–17)
AST SERPL-CCNC: 20 U/L — SIGNIFICANT CHANGE UP (ref 10–40)
BASOPHILS # BLD AUTO: 0.01 K/UL — SIGNIFICANT CHANGE UP (ref 0–0.2)
BASOPHILS NFR BLD AUTO: 0.1 % — SIGNIFICANT CHANGE UP (ref 0–2)
BILIRUB SERPL-MCNC: 0.4 MG/DL — SIGNIFICANT CHANGE UP (ref 0.2–1.2)
BUN SERPL-MCNC: 21 MG/DL — SIGNIFICANT CHANGE UP (ref 7–23)
CALCIUM SERPL-MCNC: 9.1 MG/DL — SIGNIFICANT CHANGE UP (ref 8.4–10.5)
CHLORIDE SERPL-SCNC: 104 MMOL/L — SIGNIFICANT CHANGE UP (ref 96–108)
CO2 SERPL-SCNC: 26 MMOL/L — SIGNIFICANT CHANGE UP (ref 22–31)
CREAT SERPL-MCNC: 0.78 MG/DL — SIGNIFICANT CHANGE UP (ref 0.5–1.3)
EOSINOPHIL # BLD AUTO: 0.21 K/UL — SIGNIFICANT CHANGE UP (ref 0–0.5)
EOSINOPHIL NFR BLD AUTO: 2.3 % — SIGNIFICANT CHANGE UP (ref 0–6)
GLUCOSE BLDC GLUCOMTR-MCNC: 106 MG/DL — HIGH (ref 70–99)
GLUCOSE BLDC GLUCOMTR-MCNC: 117 MG/DL — HIGH (ref 70–99)
GLUCOSE BLDC GLUCOMTR-MCNC: 152 MG/DL — HIGH (ref 70–99)
GLUCOSE BLDC GLUCOMTR-MCNC: 97 MG/DL — SIGNIFICANT CHANGE UP (ref 70–99)
GLUCOSE SERPL-MCNC: 98 MG/DL — SIGNIFICANT CHANGE UP (ref 70–99)
HCT VFR BLD CALC: 38.5 % — SIGNIFICANT CHANGE UP (ref 34.5–45)
HGB BLD-MCNC: 12.8 G/DL — SIGNIFICANT CHANGE UP (ref 11.5–15.5)
IMM GRANULOCYTES NFR BLD AUTO: 0.4 % — SIGNIFICANT CHANGE UP (ref 0–1.5)
LYMPHOCYTES # BLD AUTO: 2.93 K/UL — SIGNIFICANT CHANGE UP (ref 1–3.3)
LYMPHOCYTES # BLD AUTO: 31.7 % — SIGNIFICANT CHANGE UP (ref 13–44)
MCHC RBC-ENTMCNC: 31.8 PG — SIGNIFICANT CHANGE UP (ref 27–34)
MCHC RBC-ENTMCNC: 33.2 GM/DL — SIGNIFICANT CHANGE UP (ref 32–36)
MCV RBC AUTO: 95.5 FL — SIGNIFICANT CHANGE UP (ref 80–100)
MONOCYTES # BLD AUTO: 0.78 K/UL — SIGNIFICANT CHANGE UP (ref 0–0.9)
MONOCYTES NFR BLD AUTO: 8.4 % — SIGNIFICANT CHANGE UP (ref 2–14)
NEUTROPHILS # BLD AUTO: 5.27 K/UL — SIGNIFICANT CHANGE UP (ref 1.8–7.4)
NEUTROPHILS NFR BLD AUTO: 57.1 % — SIGNIFICANT CHANGE UP (ref 43–77)
NRBC # BLD: 0 /100 WBCS — SIGNIFICANT CHANGE UP (ref 0–0)
PLATELET # BLD AUTO: 260 K/UL — SIGNIFICANT CHANGE UP (ref 150–400)
POTASSIUM SERPL-MCNC: 4.2 MMOL/L — SIGNIFICANT CHANGE UP (ref 3.5–5.3)
POTASSIUM SERPL-SCNC: 4.2 MMOL/L — SIGNIFICANT CHANGE UP (ref 3.5–5.3)
PROT SERPL-MCNC: 6.2 G/DL — SIGNIFICANT CHANGE UP (ref 6–8.3)
RBC # BLD: 4.03 M/UL — SIGNIFICANT CHANGE UP (ref 3.8–5.2)
RBC # FLD: 14.6 % — HIGH (ref 10.3–14.5)
SODIUM SERPL-SCNC: 138 MMOL/L — SIGNIFICANT CHANGE UP (ref 135–145)
WBC # BLD: 9.24 K/UL — SIGNIFICANT CHANGE UP (ref 3.8–10.5)
WBC # FLD AUTO: 9.24 K/UL — SIGNIFICANT CHANGE UP (ref 3.8–10.5)

## 2021-01-25 PROCEDURE — 99232 SBSQ HOSP IP/OBS MODERATE 35: CPT

## 2021-01-25 PROCEDURE — 99233 SBSQ HOSP IP/OBS HIGH 50: CPT

## 2021-01-25 RX ORDER — METFORMIN HYDROCHLORIDE 850 MG/1
500 TABLET ORAL
Refills: 0 | Status: DISCONTINUED | OUTPATIENT
Start: 2021-01-25 | End: 2021-02-02

## 2021-01-25 RX ADMIN — GABAPENTIN 300 MILLIGRAM(S): 400 CAPSULE ORAL at 05:10

## 2021-01-25 RX ADMIN — GABAPENTIN 300 MILLIGRAM(S): 400 CAPSULE ORAL at 21:14

## 2021-01-25 RX ADMIN — METFORMIN HYDROCHLORIDE 500 MILLIGRAM(S): 850 TABLET ORAL at 17:32

## 2021-01-25 RX ADMIN — Medication 81 MILLIGRAM(S): at 12:13

## 2021-01-25 RX ADMIN — METFORMIN HYDROCHLORIDE 850 MILLIGRAM(S): 850 TABLET ORAL at 08:22

## 2021-01-25 RX ADMIN — DONEPEZIL HYDROCHLORIDE 10 MILLIGRAM(S): 10 TABLET, FILM COATED ORAL at 21:15

## 2021-01-25 RX ADMIN — ATORVASTATIN CALCIUM 80 MILLIGRAM(S): 80 TABLET, FILM COATED ORAL at 21:15

## 2021-01-25 RX ADMIN — GABAPENTIN 300 MILLIGRAM(S): 400 CAPSULE ORAL at 13:02

## 2021-01-25 RX ADMIN — ENOXAPARIN SODIUM 40 MILLIGRAM(S): 100 INJECTION SUBCUTANEOUS at 21:15

## 2021-01-25 RX ADMIN — PANTOPRAZOLE SODIUM 40 MILLIGRAM(S): 20 TABLET, DELAYED RELEASE ORAL at 05:10

## 2021-01-25 RX ADMIN — AMLODIPINE BESYLATE 5 MILLIGRAM(S): 2.5 TABLET ORAL at 05:10

## 2021-01-25 RX ADMIN — CLOPIDOGREL BISULFATE 75 MILLIGRAM(S): 75 TABLET, FILM COATED ORAL at 12:13

## 2021-01-25 RX ADMIN — Medication 1: at 12:12

## 2021-01-25 RX ADMIN — POLYETHYLENE GLYCOL 3350 17 GRAM(S): 17 POWDER, FOR SOLUTION ORAL at 05:10

## 2021-01-25 NOTE — PROGRESS NOTE ADULT - ASSESSMENT
65 year old RH past medical history of HTN, DM2, HLD, peripheral artery stents, memory loss who presents with inability to ambulate and lower extremity weakness due to transverse myelitis with abnormal signal at T10 on MRI s/p steroid therapy  with Gait Instability, ADL impairments and Functional impairments- pt/ot/dvt ppx, pain meds    # leukocytosis- trending down likely secondary to steroids    #Mood -on Aricept    #HTN-  amlodipine     #HLD- c/w statin    #CAD with stent  - ASA/ plavix    #DM2  - will d/c Lantus,  ISS, Accu-Cheks  - metformin was increased to 850 bid 1/24/21, FS noted, will decrease to 500 bid,  reassess in am  - s/p  three day course of methylprednisolone (1/15-1/17). FS improved now  - A1C with Estimated Average Glucose Result: 8.9% (01.12.21 @ 11:46)      #Pain control: Tylenol PRN, gabapentin    # DVT: Lovenox    will follow  d/w dr. ardon

## 2021-01-25 NOTE — PROGRESS NOTE ADULT - ASSESSMENT
· Assessment	  ASSESSMENT/PLAN  65 year old RH past medical history of HTN, DM, HLD, peripheral artery stents, memory loss who presents with inability to ambulate and lower extremity weakness due to transverse myeiltis with abnormal signal at T10 on MRI s/p steroid therapy  with Gait Instability, ADL impairments and Functional impairments.    COMORBIDITES/ACTIVE MEDICAL ISSUES     Gait Instability, ADL impairments and Functional impairments: start Comprehensive Rehab Program of PT/OT     #Transverse Myelitis (T10)  - s/p methylprednisolone therapy x 3 days, likely cause of leukocytosis  - f/u NMO ab  - Bowel Mgmt -  constipation reports last bm was on 1/11, started bowel program. HOld bowel program for 2 days for runny stool, restart 1//27 with daily miralax and senna + prn suppository.   - GI ppx with PPI  - Bladder: PVR q 8 hours (SC if > 400)    #Mood  - on aricept    #HTN/HLD/PAD with stent  - ASA/ plavix  - norvasc  - statin     #DM  -likely increased ISS scale/ BG due to recent steroid use  - will taper during course  - ISS    -Pain control: Tylenol PRN, gabapentin. Increase gabapentin to 300 TID.   - DVT: Lovenox, SCDs, TEDs     FEN   - Diet - Regular + Thins  [CCHO, DASH/TLC]      Precautions / PROPHYLAXIS:   - Falls  - ortho: Weight bearing status: full  - Lungs: Aspiration, Incentive Spirometer   - Pressure injury/Skin: Turn Q2hrs while in bed, OOB to Chair, PT/OT       · Assessment	  ASSESSMENT/PLAN  65 year old RH past medical history of HTN, DM, HLD, peripheral artery stents, memory loss who presents with inability to ambulate and lower extremity weakness due to transverse myeiltis with abnormal signal at T10 on MRI s/p steroid therapy  with Gait Instability, ADL impairments and Functional impairments.    COMORBIDITES/ACTIVE MEDICAL ISSUES     Gait Instability, ADL impairments and Functional impairments: start Comprehensive Rehab Program of PT/OT     #Transverse Myelitis (T10)  - s/p methylprednisolone therapy x 3 days, likely cause of leukocytosis  - f/u NMO ab  - Bowel Mgmt -  constipation reports last bm was on 1/11, started bowel program. HOld bowel program for 2 days for runny stool, restart 1//27 with daily miralax and senna + prn suppository.   - GI ppx with PPI  - Bladder: PVR q 8 hours (SC if > 400)    #Mood  - on aricept    #HTN/HLD/PAD with stent  - ASA/ plavix  - norvasc  - statin     #DM  -likely increased ISS scale/ BG due to recent steroid use  - will taper during course  - ISS    -Pain control: Tylenol PRN, gabapentin. Increase gabapentin to 400 TID. (1/26/21)  - DVT: Lovenox, SCDs, TEDs     FEN   - Diet - Regular + Thins  [CCHO, DASH/TLC]      Precautions / PROPHYLAXIS:   - Falls  - ortho: Weight bearing status: full  - Lungs: Aspiration, Incentive Spirometer   - Pressure injury/Skin: Turn Q2hrs while in bed, OOB to Chair, PT/OT

## 2021-01-26 ENCOUNTER — TRANSCRIPTION ENCOUNTER (OUTPATIENT)
Age: 66
End: 2021-01-26

## 2021-01-26 LAB
GLUCOSE BLDC GLUCOMTR-MCNC: 120 MG/DL — HIGH (ref 70–99)
GLUCOSE BLDC GLUCOMTR-MCNC: 149 MG/DL — HIGH (ref 70–99)
GLUCOSE BLDC GLUCOMTR-MCNC: 155 MG/DL — HIGH (ref 70–99)
GLUCOSE BLDC GLUCOMTR-MCNC: 158 MG/DL — HIGH (ref 70–99)
GLUCOSE BLDC GLUCOMTR-MCNC: 201 MG/DL — HIGH (ref 70–99)

## 2021-01-26 PROCEDURE — 99233 SBSQ HOSP IP/OBS HIGH 50: CPT

## 2021-01-26 PROCEDURE — 99232 SBSQ HOSP IP/OBS MODERATE 35: CPT

## 2021-01-26 RX ORDER — GABAPENTIN 400 MG/1
400 CAPSULE ORAL THREE TIMES A DAY
Refills: 0 | Status: DISCONTINUED | OUTPATIENT
Start: 2021-01-26 | End: 2021-01-26

## 2021-01-26 RX ORDER — POLYETHYLENE GLYCOL 3350 17 G/17G
17 POWDER, FOR SOLUTION ORAL
Refills: 0 | Status: DISCONTINUED | OUTPATIENT
Start: 2021-01-29 | End: 2021-02-02

## 2021-01-26 RX ORDER — GABAPENTIN 400 MG/1
400 CAPSULE ORAL THREE TIMES A DAY
Refills: 0 | Status: DISCONTINUED | OUTPATIENT
Start: 2021-01-26 | End: 2021-02-02

## 2021-01-26 RX ADMIN — ENOXAPARIN SODIUM 40 MILLIGRAM(S): 100 INJECTION SUBCUTANEOUS at 20:56

## 2021-01-26 RX ADMIN — Medication 2: at 17:09

## 2021-01-26 RX ADMIN — GABAPENTIN 400 MILLIGRAM(S): 400 CAPSULE ORAL at 13:34

## 2021-01-26 RX ADMIN — PANTOPRAZOLE SODIUM 40 MILLIGRAM(S): 20 TABLET, DELAYED RELEASE ORAL at 05:22

## 2021-01-26 RX ADMIN — CLOPIDOGREL BISULFATE 75 MILLIGRAM(S): 75 TABLET, FILM COATED ORAL at 11:48

## 2021-01-26 RX ADMIN — METFORMIN HYDROCHLORIDE 500 MILLIGRAM(S): 850 TABLET ORAL at 17:09

## 2021-01-26 RX ADMIN — GABAPENTIN 300 MILLIGRAM(S): 400 CAPSULE ORAL at 05:22

## 2021-01-26 RX ADMIN — METFORMIN HYDROCHLORIDE 500 MILLIGRAM(S): 850 TABLET ORAL at 07:47

## 2021-01-26 RX ADMIN — Medication 81 MILLIGRAM(S): at 11:48

## 2021-01-26 RX ADMIN — DONEPEZIL HYDROCHLORIDE 10 MILLIGRAM(S): 10 TABLET, FILM COATED ORAL at 20:56

## 2021-01-26 RX ADMIN — GABAPENTIN 400 MILLIGRAM(S): 400 CAPSULE ORAL at 20:56

## 2021-01-26 RX ADMIN — Medication 1: at 07:48

## 2021-01-26 RX ADMIN — AMLODIPINE BESYLATE 5 MILLIGRAM(S): 2.5 TABLET ORAL at 05:22

## 2021-01-26 RX ADMIN — ATORVASTATIN CALCIUM 80 MILLIGRAM(S): 80 TABLET, FILM COATED ORAL at 20:56

## 2021-01-26 NOTE — DISCHARGE NOTE PROVIDER - NSDCMRMEDTOKEN_GEN_ALL_CORE_FT
amLODIPine 5 mg oral tablet: 1 tab(s) orally once a day  aspirin 81 mg oral tablet, chewable: 1 tab(s) orally once a day  donepezil 10 mg oral tablet: 1 tab(s) orally once a day (at bedtime)  gabapentin 100 mg oral capsule: 1 cap(s) orally 3 times a day  glipiZIDE 10 mg oral tablet: 1 tab(s) orally once a day  Lipitor 80 mg oral tablet: 1 tab(s) orally once a day  metFORMIN 1000 mg oral tablet: 1 tab(s) orally 2 times a day  Plavix 75 mg oral tablet: 1 tab(s) orally once a day  senna oral tablet: 2 tab(s) orally once a day (at bedtime)  Vascepa 1 g oral capsule: 2 cap(s) orally 2 times a day   acetaminophen 325 mg oral tablet: 2 tab(s) orally every 6 hours, As needed, Temp greater or equal to 38C (100.4F), Mild Pain (1 - 3)  amLODIPine 5 mg oral tablet: 1 tab(s) orally once a day  aspirin 81 mg oral tablet, chewable: 1 tab(s) orally once a day  atorvastatin 80 mg oral tablet: 1 tab(s) orally once a day (at bedtime)  bisacodyl 10 mg rectal suppository: 1 suppository(ies) rectal once a day, As needed, Constipation  clopidogrel 75 mg oral tablet: 1 tab(s) orally once a day  donepezil 10 mg oral tablet: 1 tab(s) orally once a day (at bedtime)  gabapentin 400 mg oral capsule: 1 cap(s) orally 3 times a day  metFORMIN 500 mg oral tablet: 1 tab(s) orally 2 times a day (with meals)  pantoprazole 40 mg oral delayed release tablet: 1 tab(s) orally once a day (before a meal)  polyethylene glycol 3350 oral powder for reconstitution: 17 gram(s) orally   senna oral tablet: 2 tab(s) orally once a day (at bedtime)

## 2021-01-26 NOTE — DISCHARGE NOTE PROVIDER - PROVIDER TOKENS
PROVIDER:[TOKEN:[[id number]],FOLLOWUP:[2 weeks]],PROVIDER:[TOKEN:[[id number]],FOLLOWUP:[2 weeks]]

## 2021-01-26 NOTE — PROGRESS NOTE ADULT - ASSESSMENT
65 year old RH past medical history of HTN, DM2, HLD, peripheral artery stents, memory loss who presents with inability to ambulate and lower extremity weakness due to transverse myelitis with abnormal signal at T10 on MRI s/p steroid therapy  with Gait Instability, ADL impairments and Functional impairments- pt/ot/dvt ppx, pain meds    # leukocytosis- resolved, likely secondary to steroids    #Mood -on Aricept    #HTN-  amlodipine     #HLD- c/w statin    #CAD with stent  - ASA/ plavix    #DM2  - will d/c Lantus,  ISS, Accu-Cheks  - metformin was increased to 850 bid 1/24/21, FS noted, decreased to 500 bid, continue with same dose, reassess in am  - s/p  three day course of methylprednisolone (1/15-1/17). FS improved now  - A1C with Estimated Average Glucose Result: 8.9% (01.12.21 @ 11:46)      #Pain control: Tylenol PRN, gabapentin    # DVT: Lovenox    will follow  d/w dr. ardon

## 2021-01-26 NOTE — DISCHARGE NOTE PROVIDER - CARE PROVIDERS DIRECT ADDRESSES
,DirectAddress_Unknown,[email or web address]

## 2021-01-26 NOTE — DISCHARGE NOTE PROVIDER - NSDCCPCAREPLAN_GEN_ALL_CORE_FT
PRINCIPAL DISCHARGE DIAGNOSIS  Diagnosis: Transverse myelitis  Assessment and Plan of Treatment: Completed treatment with steroid. Follow up with neurologist as outpatient      SECONDARY DISCHARGE DIAGNOSES  Diagnosis: Urinary retention  Assessment and Plan of Treatment: You had retention on arrival to rehab. You were able to tolerate removal of solano catheter and able to urinate on your own.    Diagnosis: Diabetes mellitus  Assessment and Plan of Treatment:      PRINCIPAL DISCHARGE DIAGNOSIS  Diagnosis: Transverse myelitis  Assessment and Plan of Treatment: Completed treatment with steroid. Follow up with neurologist as outpatient      SECONDARY DISCHARGE DIAGNOSES  Diagnosis: Constipation  Assessment and Plan of Treatment: COntinue medications to maintain hannah bowel movement.    Diagnosis: Urinary retention  Assessment and Plan of Treatment: You had retention on arrival to rehab. You were able to tolerate removal of solano catheter and able to urinate on your own.    Diagnosis: Diabetes mellitus  Assessment and Plan of Treatment:      PRINCIPAL DISCHARGE DIAGNOSIS  Diagnosis: Transverse myelitis  Assessment and Plan of Treatment: Completed treatment with steroid. Follow up with neurologist as outpatient      SECONDARY DISCHARGE DIAGNOSES  Diagnosis: Constipation  Assessment and Plan of Treatment: COntinue medications to maintain daily bowel movement.    Diagnosis: Urinary retention  Assessment and Plan of Treatment: You had retention on arrival to rehab. You were able to tolerate removal of solano catheter and able to urinate on your own.    Diagnosis: Diabetes mellitus  Assessment and Plan of Treatment: Continue with metformin     PRINCIPAL DISCHARGE DIAGNOSIS  Diagnosis: Transverse myelitis  Assessment and Plan of Treatment: Completed treatment with steroid. Follow up with neurologist as outpatient      SECONDARY DISCHARGE DIAGNOSES  Diagnosis: Constipation  Assessment and Plan of Treatment: COntinue medications to maintain daily bowel movement.    Diagnosis: Urinary retention  Assessment and Plan of Treatment: You had retention on arrival to rehab. You were able to tolerate removal of solano catheter and able to urinate on your own.    Diagnosis: Diabetes mellitus  Assessment and Plan of Treatment: Continue with metformin. Check your blood sugars 3 times a day at ome prior to meals and keep log of values. Follow up with outpatient primary care physician within 2 weeks of discharge to see if your medications need to be adjusted.

## 2021-01-26 NOTE — DISCHARGE NOTE PROVIDER - CARE PROVIDER_API CALL
Niki Whitaker)  Neurology  [address] [place] [address]  Phone: [phone]  Fax: [phone]  Follow Up Time: 2 weeks    Wil Jones)  Neurology  [address] [address]  Phone: [phone]  Fax: [phone]  Follow Up Time: 2 weeks

## 2021-01-26 NOTE — PROGRESS NOTE ADULT - ASSESSMENT
· Assessment	  ASSESSMENT/PLAN  65 year old RH past medical history of HTN, DM, HLD, peripheral artery stents, memory loss who presents with inability to ambulate and lower extremity weakness due to transverse myeiltis with abnormal signal at T10 on MRI s/p steroid therapy  with Gait Instability, ADL impairments and Functional impairments.    COMORBIDITES/ACTIVE MEDICAL ISSUES     Gait Instability, ADL impairments and Functional impairments: start Comprehensive Rehab Program of PT/OT     #Transverse Myelitis (T10)  - s/p methylprednisolone therapy x 3 days, likely cause of leukocytosis  - f/u NMO ab  - Bowel Mgmt -  constipation reports last bm was on 1/11, started bowel program. HOld bowel program for 2 days for runny stool, (was constipated for 11 days before it). Plan to restart 1//29 with daily miralax every other day+ prn suppository.   - GI ppx with PPI  - Bladder: PVR q 8 hours (SC if > 400)    #Mood  - on aricept    #HTN/HLD/PAD with stent  - ASA/ plavix  - norvasc  - statin     #DM  -likely increased ISS scale/ BG due to recent steroid use  - will taper during course  - ISS    -Pain control: Tylenol PRN, gabapentin. Increase gabapentin to 300 TID.   - DVT: Lovenox, SCDs, TEDs     FEN   - Diet - Regular + Thins  [CCHO, DASH/TLC]      Precautions / PROPHYLAXIS:   - Falls  - ortho: Weight bearing status: full  - Lungs: Aspiration, Incentive Spirometer   - Pressure injury/Skin: Turn Q2hrs while in bed, OOB to Chair, PT/OT

## 2021-01-26 NOTE — DISCHARGE NOTE PROVIDER - HOSPITAL COURSE
Temi Staton is a 65 year old RH Welsh speaking female with a past medical history of HTN, DM, HLD, peripheral artery stents, memory loss who presents with inability to ambulate and lower extremity weakness. At baseline, the patient states that she is able to ambulate without any assistive devices but does so slowly. Approximately 1 week prior she began to feel a pain in her lower abdomen that wrapped around her stomach and radiated to her back bilaterally at approximately the belly button level. She stated that the pain was uncomfortable but was not associated with any weakness and she was still able to perform her daily activities. Did not have any nausea/vomiting at that time. Pain was present for several days and then 3 days ago she noted that she began to have lower extremity weakness which was worse in her RLE. Over that time, she noted that it was difficult for her to get up and ambulate and she required assistance from her family in order to walk. She then presented to the ED for further evaluation. Patient notes that she feels that she has reduced sensation in her lower extremities. Denies head trauma. Denies any symptoms in her upper extremities, or face. Denies headaches, dizziness, lightheadedness, dysphagia, dysarthria, difficulty expressing herself. Denies any recent illnesses, sick contacts, animal exposure or bites. States that she received a flu vaccine in November and has not had any vaccinations since then. No exposure to COVID-19.     Hospital Course (1/11 - 1/19): Patient received CT head (microvascular disease volume loss) and MR lumbosacral spine concerning for abnormal signal intensity at T9-T11, prompting further spinal imaging and lumbar puncture to rule out other etiologies for abnormal signal intensity. Patient arrived to the floor in stable condition. MR cervical and thoracic spine revealed  focus of abnormal signal at T10 that does not enhance. CSF studies showed 6 cells, protein of 78, negative PCR, and negative flow cytometry. NMO and MOG serum studies were sent. PT/OT recommended acute rehabilitation. Patient was treated with a three day course of methylprednisolone (1/15-1/17).       Patient neurologically stable for discharge. 65 year old RH Belarusian speaking female with a past medical history of HTN, DM, HLD, peripheral artery stents, and memory loss who presented to [facility] on 1/11 with inability to ambulate and lower extremity weakness. At baseline, the patient states that she is able to ambulate without any assistive devices but does so slowly. Approximately 1 week PTA she began to feel a pain in her lower abdomen that wrapped around her stomach and radiated to her back bilaterally at approximately the belly button level. She stated that the pain was uncomfortable but was not associated with any weakness and she was still able to perform her daily activities. Did not have any nausea/vomiting at that time. Pain was present for several days and then 3 days PTA she noted that she began to have lower extremity weakness which was worse in her RLE. Over that time, she noted that it was difficult for her to get up and ambulate and she required assistance from her family in order to walk. She then presented to the ED (1/11) for further evaluation. Patient notes that she feels that she has reduced sensation in her lower extremities. Denies head trauma. Denies any symptoms in her upper extremities, or face. Denies headaches, dizziness, lightheadedness, dysphagia, dysarthria, difficulty expressing herself. Denies any recent illnesses, sick contacts, animal exposure or bites. States that she received a flu vaccine in November and has not had any vaccinations since then. No exposure to COVID-19.     Patient received CT head (microvascular disease volume loss) and MR lumbosacral spine concerning for abnormal signal intensity at T9-T11, prompting further spinal imaging and lumbar puncture to rule out other etiologies for abnormal signal intensity. Patient arrived to the floor in stable condition. MR cervical and thoracic spine revealed  focus of abnormal signal at T10 that does not enhance. CSF studies showed 6 cells, protein of 78, negative PCR, and negative flow cytometry. NMO and MOG serum studies were sent. Patient was treated with a three day course of methylprednisolone (1/15-1/17).   Patient was deemed medically stable and discharged to [facility] on 1/19.     [facility] rehab assessment/plan    65 year old RH past medical history of HTN, DM, HLD, peripheral artery stents, memory loss who presents with inability to ambulate and lower extremity weakness due to transverse myeiltis with abnormal signal at T10 on MRI s/p steroid therapy  with Gait Instability, ADL impairments and Functional impairments.    COMORBIDITES/ACTIVE MEDICAL ISSUES     Gait Instability, ADL impairments and Functional impairments: start Comprehensive Rehab Program of PT/OT     #Transverse Myelitis (T10)  - s/p methylprednisolone therapy x 3 days, likely cause of leukocytosis  - Bowel Mgmt -  was constipated, improved with bowel regimen while in rehab, continue medications  - GI ppx with PPI  - Bladder: PVRs negative. able to void independently    #Mood  - c/w aricept    #HTN/HLD/PAD with stent  - ASA/ plavix  - norvasc  - statin     #DM  -continue with metformin as outpatient    -Pain control: Tylenol PRN, gabapentin. gabapentin to 300 TID.   - DVT ppx: was on lovenox as inpatient. No longer required on discharge      patient stable for discharge with appropriate follow up.

## 2021-01-27 LAB
GLUCOSE BLDC GLUCOMTR-MCNC: 129 MG/DL — HIGH (ref 70–99)
SARS-COV-2 RNA SPEC QL NAA+PROBE: SIGNIFICANT CHANGE UP

## 2021-01-27 PROCEDURE — 99232 SBSQ HOSP IP/OBS MODERATE 35: CPT

## 2021-01-27 RX ADMIN — GABAPENTIN 400 MILLIGRAM(S): 400 CAPSULE ORAL at 06:02

## 2021-01-27 RX ADMIN — DONEPEZIL HYDROCHLORIDE 10 MILLIGRAM(S): 10 TABLET, FILM COATED ORAL at 20:39

## 2021-01-27 RX ADMIN — ENOXAPARIN SODIUM 40 MILLIGRAM(S): 100 INJECTION SUBCUTANEOUS at 20:38

## 2021-01-27 RX ADMIN — AMLODIPINE BESYLATE 5 MILLIGRAM(S): 2.5 TABLET ORAL at 06:02

## 2021-01-27 RX ADMIN — PANTOPRAZOLE SODIUM 40 MILLIGRAM(S): 20 TABLET, DELAYED RELEASE ORAL at 06:02

## 2021-01-27 RX ADMIN — GABAPENTIN 400 MILLIGRAM(S): 400 CAPSULE ORAL at 12:05

## 2021-01-27 RX ADMIN — Medication 81 MILLIGRAM(S): at 12:05

## 2021-01-27 RX ADMIN — CLOPIDOGREL BISULFATE 75 MILLIGRAM(S): 75 TABLET, FILM COATED ORAL at 12:05

## 2021-01-27 RX ADMIN — METFORMIN HYDROCHLORIDE 500 MILLIGRAM(S): 850 TABLET ORAL at 17:21

## 2021-01-27 RX ADMIN — ATORVASTATIN CALCIUM 80 MILLIGRAM(S): 80 TABLET, FILM COATED ORAL at 20:39

## 2021-01-27 RX ADMIN — GABAPENTIN 400 MILLIGRAM(S): 400 CAPSULE ORAL at 20:39

## 2021-01-27 RX ADMIN — METFORMIN HYDROCHLORIDE 500 MILLIGRAM(S): 850 TABLET ORAL at 08:11

## 2021-01-27 NOTE — PROGRESS NOTE ADULT - ASSESSMENT
65 year old RH past medical history of HTN, DM2, HLD, peripheral artery stents, memory loss who presents with inability to ambulate and lower extremity weakness due to transverse myelitis with abnormal signal at T10 on MRI s/p steroid therapy  with Gait Instability, ADL impairments and Functional impairments- pt/ot/dvt ppx, pain meds    # leukocytosis- resolved, likely secondary to steroids    #Mood -on Aricept    #HTN-  amlodipine     #HLD- c/w statin    #CAD with stent  - ASA/ plavix    #DM2  - will d/c Lantus,  ISS, Accu-Cheks  - metformin was increased to 850 bid 1/24/21, FS noted, decreased to 500 bid 1/26/21, continue with same dose, reassess in am  - s/p  three day course of methylprednisolone (1/15-1/17). FS improved now  - A1C with Estimated Average Glucose Result: 8.9% (01.12.21 @ 11:46)      #Pain control: Tylenol PRN, gabapentin    # DVT: Lovenox    will follow  d/w dr. ardon

## 2021-01-27 NOTE — CHART NOTE - NSCHARTNOTEFT_GEN_A_CORE
NUTRITION FOLLOW UP    SOURCE: Patient [X)   Family [ ]    Medical Record (X)    DIET: Consistent Carbohydrate  SUPPLEMENT: Glucerna Shake QD    Pt reports good appetite. Consuming >75% of meals. Continues on Glucerna per her own request. Optimal glycemic control noted. Discouraged juice intake. Ongoing diabetes management education. Written materials previously provided.     CURRENT WEIGHT:   (1/24) 143.3lbs     PERTINENT MEDS:   Pertinent Medications: MEDICATIONS  (STANDING):  amLODIPine   Tablet 5 milliGRAM(s) Oral daily  aspirin  chewable 81 milliGRAM(s) Oral daily  atorvastatin 80 milliGRAM(s) Oral at bedtime  clopidogrel Tablet 75 milliGRAM(s) Oral daily  dextrose 40% Gel 15 Gram(s) Oral once  dextrose 5%. 1000 milliLiter(s) (50 mL/Hr) IV Continuous <Continuous>  dextrose 5%. 1000 milliLiter(s) (100 mL/Hr) IV Continuous <Continuous>  dextrose 50% Injectable 25 Gram(s) IV Push once  dextrose 50% Injectable 25 Gram(s) IV Push once  dextrose 50% Injectable 12.5 Gram(s) IV Push once  donepezil 10 milliGRAM(s) Oral at bedtime  enoxaparin Injectable 40 milliGRAM(s) SubCutaneous at bedtime  gabapentin 400 milliGRAM(s) Oral three times a day  glucagon  Injectable 1 milliGRAM(s) IntraMuscular once  insulin lispro (ADMELOG) corrective regimen sliding scale   SubCutaneous three times a day before meals  insulin lispro (ADMELOG) corrective regimen sliding scale   SubCutaneous at bedtime  metFORMIN 500 milliGRAM(s) Oral two times a day with meals  pantoprazole    Tablet 40 milliGRAM(s) Oral before breakfast    MEDICATIONS  (PRN):  acetaminophen   Tablet .. 650 milliGRAM(s) Oral every 6 hours PRN Temp greater or equal to 38C (100.4F), Mild Pain (1 - 3)  bisacodyl Suppository 10 milliGRAM(s) Rectal daily PRN Constipation      PERTINENT LABS:  01-25 Na138 mmol/L Glu 98 mg/dL K+ 4.2 mmol/L Cr  0.78 mg/dL BUN 21 mg/dL 01-25 Alb 2.8 g/dL<L>    POCT (1/27) 129mg/dl    SKIN:  intact  EDEMA: +1 bilateral legs  LAST BM: 1/26    ESTIMATED NEEDS:   [X] no change since previous assessment  [ ] recalculated:     PREVIOUS NUTRITION DIAGNOSIS:    1. Altered nutrition related lab values- optimal glycemic control now. Ongoing diet education     NUTRITION DIAGNOSIS is :  (X)  Ongoing      NEW NUTRITION DIAGNOSIS: N/A    NUTRITION RECOMMENDATIONS:   1. Continue current nutrition plan of care. (glucerna per pt request)  2. Ongoing diet education +written materials  3. Obtain and honor food preferences as able     MONITORING AND EVALUATION:   1. Tolerance to diet prescription   2. PO intake  3. Weights  4. Labs  5. Follow Up per protocol     RD to remain available  Elise Long RDN   Pager #[phone]

## 2021-01-28 LAB
ALBUMIN SERPL ELPH-MCNC: 3.1 G/DL — LOW (ref 3.3–5)
ALP SERPL-CCNC: 106 U/L — SIGNIFICANT CHANGE UP (ref 40–120)
ALT FLD-CCNC: 51 U/L — HIGH (ref 10–45)
ANION GAP SERPL CALC-SCNC: 8 MMOL/L — SIGNIFICANT CHANGE UP (ref 5–17)
AST SERPL-CCNC: 25 U/L — SIGNIFICANT CHANGE UP (ref 10–40)
BASOPHILS # BLD AUTO: 0.03 K/UL — SIGNIFICANT CHANGE UP (ref 0–0.2)
BASOPHILS NFR BLD AUTO: 0.3 % — SIGNIFICANT CHANGE UP (ref 0–2)
BILIRUB SERPL-MCNC: 0.3 MG/DL — SIGNIFICANT CHANGE UP (ref 0.2–1.2)
BUN SERPL-MCNC: 12 MG/DL — SIGNIFICANT CHANGE UP (ref 7–23)
CALCIUM SERPL-MCNC: 9.1 MG/DL — SIGNIFICANT CHANGE UP (ref 8.4–10.5)
CHLORIDE SERPL-SCNC: 106 MMOL/L — SIGNIFICANT CHANGE UP (ref 96–108)
CO2 SERPL-SCNC: 27 MMOL/L — SIGNIFICANT CHANGE UP (ref 22–31)
CREAT SERPL-MCNC: 0.76 MG/DL — SIGNIFICANT CHANGE UP (ref 0.5–1.3)
EOSINOPHIL # BLD AUTO: 0.15 K/UL — SIGNIFICANT CHANGE UP (ref 0–0.5)
EOSINOPHIL NFR BLD AUTO: 1.7 % — SIGNIFICANT CHANGE UP (ref 0–6)
GLUCOSE BLDC GLUCOMTR-MCNC: 171 MG/DL — HIGH (ref 70–99)
GLUCOSE SERPL-MCNC: 134 MG/DL — HIGH (ref 70–99)
HCT VFR BLD CALC: 38.7 % — SIGNIFICANT CHANGE UP (ref 34.5–45)
HGB BLD-MCNC: 12.9 G/DL — SIGNIFICANT CHANGE UP (ref 11.5–15.5)
IMM GRANULOCYTES NFR BLD AUTO: 0.3 % — SIGNIFICANT CHANGE UP (ref 0–1.5)
LYMPHOCYTES # BLD AUTO: 2.12 K/UL — SIGNIFICANT CHANGE UP (ref 1–3.3)
LYMPHOCYTES # BLD AUTO: 23.7 % — SIGNIFICANT CHANGE UP (ref 13–44)
MCHC RBC-ENTMCNC: 31.9 PG — SIGNIFICANT CHANGE UP (ref 27–34)
MCHC RBC-ENTMCNC: 33.3 GM/DL — SIGNIFICANT CHANGE UP (ref 32–36)
MCV RBC AUTO: 95.8 FL — SIGNIFICANT CHANGE UP (ref 80–100)
MONOCYTES # BLD AUTO: 0.9 K/UL — SIGNIFICANT CHANGE UP (ref 0–0.9)
MONOCYTES NFR BLD AUTO: 10 % — SIGNIFICANT CHANGE UP (ref 2–14)
NEUTROPHILS # BLD AUTO: 5.73 K/UL — SIGNIFICANT CHANGE UP (ref 1.8–7.4)
NEUTROPHILS NFR BLD AUTO: 64 % — SIGNIFICANT CHANGE UP (ref 43–77)
NRBC # BLD: 0 /100 WBCS — SIGNIFICANT CHANGE UP (ref 0–0)
PLATELET # BLD AUTO: 246 K/UL — SIGNIFICANT CHANGE UP (ref 150–400)
POTASSIUM SERPL-MCNC: 4.4 MMOL/L — SIGNIFICANT CHANGE UP (ref 3.5–5.3)
POTASSIUM SERPL-SCNC: 4.4 MMOL/L — SIGNIFICANT CHANGE UP (ref 3.5–5.3)
PROT SERPL-MCNC: 6.5 G/DL — SIGNIFICANT CHANGE UP (ref 6–8.3)
RBC # BLD: 4.04 M/UL — SIGNIFICANT CHANGE UP (ref 3.8–5.2)
RBC # FLD: 14.6 % — HIGH (ref 10.3–14.5)
SODIUM SERPL-SCNC: 141 MMOL/L — SIGNIFICANT CHANGE UP (ref 135–145)
WBC # BLD: 8.96 K/UL — SIGNIFICANT CHANGE UP (ref 3.8–10.5)
WBC # FLD AUTO: 8.96 K/UL — SIGNIFICANT CHANGE UP (ref 3.8–10.5)

## 2021-01-28 PROCEDURE — 74018 RADEX ABDOMEN 1 VIEW: CPT | Mod: 26

## 2021-01-28 PROCEDURE — 99232 SBSQ HOSP IP/OBS MODERATE 35: CPT

## 2021-01-28 RX ORDER — MINERAL OIL
133 OIL (ML) MISCELLANEOUS ONCE
Refills: 0 | Status: DISCONTINUED | OUTPATIENT
Start: 2021-01-28 | End: 2021-01-29

## 2021-01-28 RX ADMIN — GABAPENTIN 400 MILLIGRAM(S): 400 CAPSULE ORAL at 06:14

## 2021-01-28 RX ADMIN — ATORVASTATIN CALCIUM 80 MILLIGRAM(S): 80 TABLET, FILM COATED ORAL at 20:45

## 2021-01-28 RX ADMIN — ENOXAPARIN SODIUM 40 MILLIGRAM(S): 100 INJECTION SUBCUTANEOUS at 20:46

## 2021-01-28 RX ADMIN — METFORMIN HYDROCHLORIDE 500 MILLIGRAM(S): 850 TABLET ORAL at 08:01

## 2021-01-28 RX ADMIN — CLOPIDOGREL BISULFATE 75 MILLIGRAM(S): 75 TABLET, FILM COATED ORAL at 11:28

## 2021-01-28 RX ADMIN — METFORMIN HYDROCHLORIDE 500 MILLIGRAM(S): 850 TABLET ORAL at 16:44

## 2021-01-28 RX ADMIN — DONEPEZIL HYDROCHLORIDE 10 MILLIGRAM(S): 10 TABLET, FILM COATED ORAL at 20:45

## 2021-01-28 RX ADMIN — Medication 1: at 07:59

## 2021-01-28 RX ADMIN — Medication 81 MILLIGRAM(S): at 11:28

## 2021-01-28 RX ADMIN — GABAPENTIN 400 MILLIGRAM(S): 400 CAPSULE ORAL at 16:45

## 2021-01-28 RX ADMIN — PANTOPRAZOLE SODIUM 40 MILLIGRAM(S): 20 TABLET, DELAYED RELEASE ORAL at 06:14

## 2021-01-28 RX ADMIN — GABAPENTIN 400 MILLIGRAM(S): 400 CAPSULE ORAL at 20:45

## 2021-01-28 RX ADMIN — AMLODIPINE BESYLATE 5 MILLIGRAM(S): 2.5 TABLET ORAL at 06:14

## 2021-01-28 NOTE — PROGRESS NOTE ADULT - ASSESSMENT
65 year old RH past medical history of HTN, DM2, HLD, peripheral artery stents, memory loss who presents with inability to ambulate and lower extremity weakness due to transverse myelitis with abnormal signal at T10 on MRI s/p steroid therapy  with Gait Instability, ADL impairments and Functional impairments- pt/ot/dvt ppx, pain meds    # leukocytosis- resolved, likely secondary to steroids    #Mood -on Aricept    #HTN-  amlodipine     #HLD- c/w statin    #CAD with stent  - ASA/ plavix    #DM2  - Lantus as d/c,   -  c/w metformin  - d/c Accu-Cheks   - diabetic diet  - A1C with Estimated Average Glucose Result: 8.9% (01.12.21 @ 11:46)      #Pain control: Tylenol PRN, gabapentin    # DVT: Lovenox    will follow  d/w dr. ardon

## 2021-01-28 NOTE — PROGRESS NOTE ADULT - ASSESSMENT
· Assessment	  ASSESSMENT/PLAN  65 year old RH past medical history of HTN, DM, HLD, peripheral artery stents, memory loss who presents with inability to ambulate and lower extremity weakness due to transverse myeiltis with abnormal signal at T10 on MRI s/p steroid therapy  with Gait Instability, ADL impairments and Functional impairments.    COMORBIDITES/ACTIVE MEDICAL ISSUES     Gait Instability, ADL impairments and Functional impairments: start Comprehensive Rehab Program of PT/OT     #Transverse Myelitis (T10)  - s/p methylprednisolone therapy x 3 days, likely cause of leukocytosis  - f/u NMO ab  - Bowel Mgmt -  constipation reports last bm was on 1/11, started bowel program. HOld bowel program for 2 days for runny stool, (was constipated for 11 days before it). Plan to restart 1//29 with daily miralax every other day+ prn suppository.   Plan to get KUB 1/28/21  - GI ppx with PPI  - Bladder: PVR q 8 hours (SC if > 400)    #Mood  - on aricept    #HTN/HLD/PAD with stent  - ASA/ plavix  - norvasc  - statin     #DM  -likely increased ISS scale/ BG due to recent steroid use  - will taper during course  - ISS    -Pain control: Tylenol PRN, gabapentin. Increase gabapentin to 300 TID.   - DVT: Lovenox, SCDs, TEDs     FEN   - Diet - Regular + Thins  [CCHO, DASH/TLC]      Precautions / PROPHYLAXIS:   - Falls  - ortho: Weight bearing status: full  - Lungs: Aspiration, Incentive Spirometer   - Pressure injury/Skin: Turn Q2hrs while in bed, OOB to Chair, PT/OT

## 2021-01-29 LAB — GLUCOSE BLDC GLUCOMTR-MCNC: 140 MG/DL — HIGH (ref 70–99)

## 2021-01-29 PROCEDURE — 99232 SBSQ HOSP IP/OBS MODERATE 35: CPT

## 2021-01-29 RX ORDER — MINERAL OIL
133 OIL (ML) MISCELLANEOUS
Refills: 0 | Status: COMPLETED | OUTPATIENT
Start: 2021-01-29 | End: 2021-01-29

## 2021-01-29 RX ADMIN — CLOPIDOGREL BISULFATE 75 MILLIGRAM(S): 75 TABLET, FILM COATED ORAL at 12:19

## 2021-01-29 RX ADMIN — ATORVASTATIN CALCIUM 80 MILLIGRAM(S): 80 TABLET, FILM COATED ORAL at 21:17

## 2021-01-29 RX ADMIN — PANTOPRAZOLE SODIUM 40 MILLIGRAM(S): 20 TABLET, DELAYED RELEASE ORAL at 06:37

## 2021-01-29 RX ADMIN — ENOXAPARIN SODIUM 40 MILLIGRAM(S): 100 INJECTION SUBCUTANEOUS at 21:17

## 2021-01-29 RX ADMIN — METFORMIN HYDROCHLORIDE 500 MILLIGRAM(S): 850 TABLET ORAL at 08:27

## 2021-01-29 RX ADMIN — GABAPENTIN 400 MILLIGRAM(S): 400 CAPSULE ORAL at 06:42

## 2021-01-29 RX ADMIN — DONEPEZIL HYDROCHLORIDE 10 MILLIGRAM(S): 10 TABLET, FILM COATED ORAL at 21:17

## 2021-01-29 RX ADMIN — POLYETHYLENE GLYCOL 3350 17 GRAM(S): 17 POWDER, FOR SOLUTION ORAL at 12:20

## 2021-01-29 RX ADMIN — AMLODIPINE BESYLATE 5 MILLIGRAM(S): 2.5 TABLET ORAL at 06:37

## 2021-01-29 RX ADMIN — METFORMIN HYDROCHLORIDE 500 MILLIGRAM(S): 850 TABLET ORAL at 17:28

## 2021-01-29 RX ADMIN — GABAPENTIN 400 MILLIGRAM(S): 400 CAPSULE ORAL at 12:24

## 2021-01-29 RX ADMIN — GABAPENTIN 400 MILLIGRAM(S): 400 CAPSULE ORAL at 21:18

## 2021-01-29 RX ADMIN — Medication 81 MILLIGRAM(S): at 12:20

## 2021-01-29 RX ADMIN — Medication 133 MILLILITER(S): at 21:17

## 2021-01-29 NOTE — PROGRESS NOTE ADULT - ASSESSMENT
65 year old RH past medical history of HTN, DM2, HLD, peripheral artery stents, memory loss who presents with inability to ambulate and lower extremity weakness due to transverse myelitis with abnormal signal at T10 on MRI s/p steroid therapy  with Gait Instability, ADL impairments and Functional impairments- pt/ot/dvt ppx, pain meds    #Mood -on Aricept    #HTN-  amlodipine     #HLD- c/w statin    #CAD with stent  - ASA/ plavix    #DM2- controlled  - c/w metformin, diabetic diet, Lantus iss, Accu-Cheks d/c'd  - A1C with Estimated Average Glucose Result: 8.9% (01.12.21 @ 11:46)    # leukocytosis- resolved, likely secondary to steroids    #Pain control: Tylenol PRN, gabapentin    # DVT: Lovenox    will follow  d/w rehab dr. estevez

## 2021-01-30 LAB
GLUCOSE BLDC GLUCOMTR-MCNC: 134 MG/DL — HIGH (ref 70–99)
GLUCOSE BLDC GLUCOMTR-MCNC: 152 MG/DL — HIGH (ref 70–99)
GLUCOSE BLDC GLUCOMTR-MCNC: 174 MG/DL — HIGH (ref 70–99)

## 2021-01-30 PROCEDURE — 99232 SBSQ HOSP IP/OBS MODERATE 35: CPT

## 2021-01-30 RX ADMIN — GABAPENTIN 400 MILLIGRAM(S): 400 CAPSULE ORAL at 12:56

## 2021-01-30 RX ADMIN — Medication 81 MILLIGRAM(S): at 12:56

## 2021-01-30 RX ADMIN — AMLODIPINE BESYLATE 5 MILLIGRAM(S): 2.5 TABLET ORAL at 06:50

## 2021-01-30 RX ADMIN — Medication 1: at 17:52

## 2021-01-30 RX ADMIN — DONEPEZIL HYDROCHLORIDE 10 MILLIGRAM(S): 10 TABLET, FILM COATED ORAL at 22:26

## 2021-01-30 RX ADMIN — Medication 1: at 08:46

## 2021-01-30 RX ADMIN — ENOXAPARIN SODIUM 40 MILLIGRAM(S): 100 INJECTION SUBCUTANEOUS at 22:24

## 2021-01-30 RX ADMIN — METFORMIN HYDROCHLORIDE 500 MILLIGRAM(S): 850 TABLET ORAL at 17:52

## 2021-01-30 RX ADMIN — CLOPIDOGREL BISULFATE 75 MILLIGRAM(S): 75 TABLET, FILM COATED ORAL at 12:56

## 2021-01-30 RX ADMIN — GABAPENTIN 400 MILLIGRAM(S): 400 CAPSULE ORAL at 22:45

## 2021-01-30 RX ADMIN — GABAPENTIN 400 MILLIGRAM(S): 400 CAPSULE ORAL at 06:51

## 2021-01-30 RX ADMIN — PANTOPRAZOLE SODIUM 40 MILLIGRAM(S): 20 TABLET, DELAYED RELEASE ORAL at 06:50

## 2021-01-30 RX ADMIN — METFORMIN HYDROCHLORIDE 500 MILLIGRAM(S): 850 TABLET ORAL at 08:47

## 2021-01-30 RX ADMIN — ATORVASTATIN CALCIUM 80 MILLIGRAM(S): 80 TABLET, FILM COATED ORAL at 22:26

## 2021-01-30 NOTE — PROGRESS NOTE ADULT - ASSESSMENT
65 year old RH past medical history of HTN, DM2, HLD, peripheral artery stents, memory loss who presents with inability to ambulate and lower extremity weakness due to transverse myelitis with abnormal signal at T10 on MRI s/p steroid therapy  with Gait Instability, ADL impairments and Functional impairments- pt/ot/dvt ppx, pain meds    #Mood -on Aricept    #HTN-  amlodipine     #HLD- c/w statin    #CAD with stent  - ASA/ plavix    #DM2- controlled  - c/w metformin, diabetic diet, Lantus iss, Accu-Cheks d/c'd  - A1C with Estimated Average Glucose Result: 8.9% (01.12.21 @ 11:46)    # leukocytosis- resolved, likely secondary to steroids    #Pain control: Tylenol PRN, gabapentin    # DVT: Lovenox

## 2021-01-31 LAB
GLUCOSE BLDC GLUCOMTR-MCNC: 146 MG/DL — HIGH (ref 70–99)
GLUCOSE BLDC GLUCOMTR-MCNC: 173 MG/DL — HIGH (ref 70–99)
GLUCOSE BLDC GLUCOMTR-MCNC: 178 MG/DL — HIGH (ref 70–99)
SARS-COV-2 RNA SPEC QL NAA+PROBE: SIGNIFICANT CHANGE UP

## 2021-01-31 PROCEDURE — 99232 SBSQ HOSP IP/OBS MODERATE 35: CPT

## 2021-01-31 RX ADMIN — Medication 1: at 12:55

## 2021-01-31 RX ADMIN — METFORMIN HYDROCHLORIDE 500 MILLIGRAM(S): 850 TABLET ORAL at 18:03

## 2021-01-31 RX ADMIN — AMLODIPINE BESYLATE 5 MILLIGRAM(S): 2.5 TABLET ORAL at 06:21

## 2021-01-31 RX ADMIN — PANTOPRAZOLE SODIUM 40 MILLIGRAM(S): 20 TABLET, DELAYED RELEASE ORAL at 06:21

## 2021-01-31 RX ADMIN — GABAPENTIN 400 MILLIGRAM(S): 400 CAPSULE ORAL at 18:03

## 2021-01-31 RX ADMIN — DONEPEZIL HYDROCHLORIDE 10 MILLIGRAM(S): 10 TABLET, FILM COATED ORAL at 21:18

## 2021-01-31 RX ADMIN — ATORVASTATIN CALCIUM 80 MILLIGRAM(S): 80 TABLET, FILM COATED ORAL at 21:18

## 2021-01-31 RX ADMIN — Medication 81 MILLIGRAM(S): at 12:53

## 2021-01-31 RX ADMIN — GABAPENTIN 400 MILLIGRAM(S): 400 CAPSULE ORAL at 06:22

## 2021-01-31 RX ADMIN — Medication 1: at 18:03

## 2021-01-31 RX ADMIN — CLOPIDOGREL BISULFATE 75 MILLIGRAM(S): 75 TABLET, FILM COATED ORAL at 12:53

## 2021-01-31 RX ADMIN — METFORMIN HYDROCHLORIDE 500 MILLIGRAM(S): 850 TABLET ORAL at 12:53

## 2021-01-31 RX ADMIN — ENOXAPARIN SODIUM 40 MILLIGRAM(S): 100 INJECTION SUBCUTANEOUS at 21:18

## 2021-01-31 RX ADMIN — GABAPENTIN 400 MILLIGRAM(S): 400 CAPSULE ORAL at 21:19

## 2021-01-31 NOTE — PROGRESS NOTE ADULT - ASSESSMENT
65 year old RH past medical history of HTN, DM2, HLD, peripheral artery stents, memory loss who presents with inability to ambulate and lower extremity weakness due to transverse myelitis with abnormal signal at T10 on MRI s/p steroid therapy  with Gait Instability    #Mood -on Aricept    #HTN-  amlodipine     #HLD- c/w statin    #CAD with stent  - ASA/ plavix    #DM2- controlled  - c/w metformin, diabetic diet, Lantus iss, Accu-Cheks d/c'd  - A1C with Estimated Average Glucose Result: 8.9% (01.12.21 @ 11:46)    # leukocytosis- resolved, likely secondary to steroids    #Pain control: Tylenol PRN, gabapentin    # DVT: Lovenox

## 2021-02-01 LAB
ALBUMIN SERPL ELPH-MCNC: 3.2 G/DL — LOW (ref 3.3–5)
ALP SERPL-CCNC: 114 U/L — SIGNIFICANT CHANGE UP (ref 40–120)
ALT FLD-CCNC: 69 U/L — HIGH (ref 10–45)
ANION GAP SERPL CALC-SCNC: 8 MMOL/L — SIGNIFICANT CHANGE UP (ref 5–17)
AST SERPL-CCNC: 27 U/L — SIGNIFICANT CHANGE UP (ref 10–40)
BASOPHILS # BLD AUTO: 0.04 K/UL — SIGNIFICANT CHANGE UP (ref 0–0.2)
BASOPHILS NFR BLD AUTO: 0.4 % — SIGNIFICANT CHANGE UP (ref 0–2)
BILIRUB SERPL-MCNC: 0.4 MG/DL — SIGNIFICANT CHANGE UP (ref 0.2–1.2)
BUN SERPL-MCNC: 18 MG/DL — SIGNIFICANT CHANGE UP (ref 7–23)
CALCIUM SERPL-MCNC: 9.5 MG/DL — SIGNIFICANT CHANGE UP (ref 8.4–10.5)
CHLORIDE SERPL-SCNC: 105 MMOL/L — SIGNIFICANT CHANGE UP (ref 96–108)
CO2 SERPL-SCNC: 27 MMOL/L — SIGNIFICANT CHANGE UP (ref 22–31)
CREAT SERPL-MCNC: 0.84 MG/DL — SIGNIFICANT CHANGE UP (ref 0.5–1.3)
EOSINOPHIL # BLD AUTO: 0.14 K/UL — SIGNIFICANT CHANGE UP (ref 0–0.5)
EOSINOPHIL NFR BLD AUTO: 1.5 % — SIGNIFICANT CHANGE UP (ref 0–6)
GLUCOSE BLDC GLUCOMTR-MCNC: 132 MG/DL — HIGH (ref 70–99)
GLUCOSE BLDC GLUCOMTR-MCNC: 138 MG/DL — HIGH (ref 70–99)
GLUCOSE BLDC GLUCOMTR-MCNC: 214 MG/DL — HIGH (ref 70–99)
GLUCOSE SERPL-MCNC: 130 MG/DL — HIGH (ref 70–99)
HCT VFR BLD CALC: 39.2 % — SIGNIFICANT CHANGE UP (ref 34.5–45)
HGB BLD-MCNC: 12.6 G/DL — SIGNIFICANT CHANGE UP (ref 11.5–15.5)
IMM GRANULOCYTES NFR BLD AUTO: 0.3 % — SIGNIFICANT CHANGE UP (ref 0–1.5)
LYMPHOCYTES # BLD AUTO: 2.14 K/UL — SIGNIFICANT CHANGE UP (ref 1–3.3)
LYMPHOCYTES # BLD AUTO: 23.4 % — SIGNIFICANT CHANGE UP (ref 13–44)
MCHC RBC-ENTMCNC: 30.9 PG — SIGNIFICANT CHANGE UP (ref 27–34)
MCHC RBC-ENTMCNC: 32.1 GM/DL — SIGNIFICANT CHANGE UP (ref 32–36)
MCV RBC AUTO: 96.1 FL — SIGNIFICANT CHANGE UP (ref 80–100)
MONOCYTES # BLD AUTO: 0.55 K/UL — SIGNIFICANT CHANGE UP (ref 0–0.9)
MONOCYTES NFR BLD AUTO: 6 % — SIGNIFICANT CHANGE UP (ref 2–14)
NEUTROPHILS # BLD AUTO: 6.26 K/UL — SIGNIFICANT CHANGE UP (ref 1.8–7.4)
NEUTROPHILS NFR BLD AUTO: 68.4 % — SIGNIFICANT CHANGE UP (ref 43–77)
NRBC # BLD: 0 /100 WBCS — SIGNIFICANT CHANGE UP (ref 0–0)
PLATELET # BLD AUTO: 226 K/UL — SIGNIFICANT CHANGE UP (ref 150–400)
POTASSIUM SERPL-MCNC: 4.1 MMOL/L — SIGNIFICANT CHANGE UP (ref 3.5–5.3)
POTASSIUM SERPL-SCNC: 4.1 MMOL/L — SIGNIFICANT CHANGE UP (ref 3.5–5.3)
PROT SERPL-MCNC: 6.6 G/DL — SIGNIFICANT CHANGE UP (ref 6–8.3)
RBC # BLD: 4.08 M/UL — SIGNIFICANT CHANGE UP (ref 3.8–5.2)
RBC # FLD: 14.8 % — HIGH (ref 10.3–14.5)
SODIUM SERPL-SCNC: 140 MMOL/L — SIGNIFICANT CHANGE UP (ref 135–145)
WBC # BLD: 9.16 K/UL — SIGNIFICANT CHANGE UP (ref 3.8–10.5)
WBC # FLD AUTO: 9.16 K/UL — SIGNIFICANT CHANGE UP (ref 3.8–10.5)

## 2021-02-01 PROCEDURE — 99232 SBSQ HOSP IP/OBS MODERATE 35: CPT

## 2021-02-01 RX ORDER — ICOSAPENT ETHYL 500 MG/1
2 CAPSULE, LIQUID FILLED ORAL
Qty: 0 | Refills: 0 | DISCHARGE

## 2021-02-01 RX ORDER — GABAPENTIN 400 MG/1
1 CAPSULE ORAL
Qty: 90 | Refills: 0
Start: 2021-02-01 | End: 2021-03-02

## 2021-02-01 RX ORDER — PANTOPRAZOLE SODIUM 20 MG/1
1 TABLET, DELAYED RELEASE ORAL
Qty: 30 | Refills: 0
Start: 2021-02-01 | End: 2021-03-02

## 2021-02-01 RX ORDER — CLOPIDOGREL BISULFATE 75 MG/1
1 TABLET, FILM COATED ORAL
Qty: 0 | Refills: 0 | DISCHARGE

## 2021-02-01 RX ORDER — ATORVASTATIN CALCIUM 80 MG/1
1 TABLET, FILM COATED ORAL
Qty: 0 | Refills: 0 | DISCHARGE

## 2021-02-01 RX ORDER — ASPIRIN/CALCIUM CARB/MAGNESIUM 324 MG
1 TABLET ORAL
Qty: 30 | Refills: 0
Start: 2021-02-01 | End: 2021-03-02

## 2021-02-01 RX ORDER — DONEPEZIL HYDROCHLORIDE 10 MG/1
1 TABLET, FILM COATED ORAL
Qty: 0 | Refills: 0 | DISCHARGE

## 2021-02-01 RX ORDER — METFORMIN HYDROCHLORIDE 850 MG/1
1 TABLET ORAL
Qty: 0 | Refills: 0 | DISCHARGE

## 2021-02-01 RX ORDER — ACETAMINOPHEN 500 MG
2 TABLET ORAL
Qty: 0 | Refills: 0 | DISCHARGE
Start: 2021-02-01

## 2021-02-01 RX ORDER — AMLODIPINE BESYLATE 2.5 MG/1
1 TABLET ORAL
Qty: 30 | Refills: 0
Start: 2021-02-01 | End: 2021-03-02

## 2021-02-01 RX ORDER — POLYETHYLENE GLYCOL 3350 17 G/17G
17 POWDER, FOR SOLUTION ORAL
Qty: 0 | Refills: 0 | DISCHARGE
Start: 2021-02-01

## 2021-02-01 RX ORDER — CLOPIDOGREL BISULFATE 75 MG/1
1 TABLET, FILM COATED ORAL
Qty: 30 | Refills: 0
Start: 2021-02-01 | End: 2021-03-02

## 2021-02-01 RX ORDER — ATORVASTATIN CALCIUM 80 MG/1
1 TABLET, FILM COATED ORAL
Qty: 30 | Refills: 0
Start: 2021-02-01 | End: 2021-03-02

## 2021-02-01 RX ORDER — GABAPENTIN 400 MG/1
1 CAPSULE ORAL
Qty: 0 | Refills: 0 | DISCHARGE

## 2021-02-01 RX ORDER — AMLODIPINE BESYLATE 2.5 MG/1
1 TABLET ORAL
Qty: 0 | Refills: 0 | DISCHARGE

## 2021-02-01 RX ORDER — DONEPEZIL HYDROCHLORIDE 10 MG/1
1 TABLET, FILM COATED ORAL
Qty: 30 | Refills: 0
Start: 2021-02-01 | End: 2021-03-02

## 2021-02-01 RX ORDER — METFORMIN HYDROCHLORIDE 850 MG/1
1 TABLET ORAL
Qty: 60 | Refills: 0
Start: 2021-02-01 | End: 2021-03-02

## 2021-02-01 RX ORDER — ASPIRIN/CALCIUM CARB/MAGNESIUM 324 MG
1 TABLET ORAL
Qty: 0 | Refills: 0 | DISCHARGE

## 2021-02-01 RX ADMIN — AMLODIPINE BESYLATE 5 MILLIGRAM(S): 2.5 TABLET ORAL at 05:59

## 2021-02-01 RX ADMIN — Medication 81 MILLIGRAM(S): at 11:55

## 2021-02-01 RX ADMIN — GABAPENTIN 400 MILLIGRAM(S): 400 CAPSULE ORAL at 05:59

## 2021-02-01 RX ADMIN — ENOXAPARIN SODIUM 40 MILLIGRAM(S): 100 INJECTION SUBCUTANEOUS at 21:54

## 2021-02-01 RX ADMIN — METFORMIN HYDROCHLORIDE 500 MILLIGRAM(S): 850 TABLET ORAL at 07:56

## 2021-02-01 RX ADMIN — Medication 2: at 11:54

## 2021-02-01 RX ADMIN — DONEPEZIL HYDROCHLORIDE 10 MILLIGRAM(S): 10 TABLET, FILM COATED ORAL at 21:54

## 2021-02-01 RX ADMIN — CLOPIDOGREL BISULFATE 75 MILLIGRAM(S): 75 TABLET, FILM COATED ORAL at 11:55

## 2021-02-01 RX ADMIN — PANTOPRAZOLE SODIUM 40 MILLIGRAM(S): 20 TABLET, DELAYED RELEASE ORAL at 05:59

## 2021-02-01 RX ADMIN — ATORVASTATIN CALCIUM 80 MILLIGRAM(S): 80 TABLET, FILM COATED ORAL at 21:54

## 2021-02-01 RX ADMIN — GABAPENTIN 400 MILLIGRAM(S): 400 CAPSULE ORAL at 21:54

## 2021-02-01 RX ADMIN — GABAPENTIN 400 MILLIGRAM(S): 400 CAPSULE ORAL at 12:46

## 2021-02-01 RX ADMIN — METFORMIN HYDROCHLORIDE 500 MILLIGRAM(S): 850 TABLET ORAL at 17:21

## 2021-02-01 NOTE — PROGRESS NOTE ADULT - ASSESSMENT
65 year old RH past medical history of HTN, DM2, HLD, peripheral artery stents, memory loss who presents with inability to ambulate and lower extremity weakness due to transverse myelitis with abnormal signal at T10 on MRI s/p steroid therapy  with Gait Instability, ADL impairments and Functional impairments- pt/ot/dvt ppx, pain meds    #Mood -on Aricept    #HTN-  amlodipine     #HLD- c/w statin    #CAD with stent  - ASA/ plavix    #DM2- controlled  - c/w metformin, diabetic diet, Lantus d/c  - A1C with Estimated Average Glucose Result: 8.9% (01.12.21 @ 11:46)    # leukocytosis- resolved, likely secondary to steroids    #Pain control: Tylenol PRN, gabapentin    # DVT: Lovenox    will follow  d/w rehab dr. frye

## 2021-02-02 ENCOUNTER — TRANSCRIPTION ENCOUNTER (OUTPATIENT)
Age: 66
End: 2021-02-02

## 2021-02-02 VITALS
SYSTOLIC BLOOD PRESSURE: 122 MMHG | HEART RATE: 74 BPM | OXYGEN SATURATION: 96 % | TEMPERATURE: 98 F | RESPIRATION RATE: 15 BRPM | DIASTOLIC BLOOD PRESSURE: 67 MMHG

## 2021-02-02 PROCEDURE — 99232 SBSQ HOSP IP/OBS MODERATE 35: CPT

## 2021-02-02 PROCEDURE — 99238 HOSP IP/OBS DSCHRG MGMT 30/<: CPT

## 2021-02-02 RX ADMIN — POLYETHYLENE GLYCOL 3350 17 GRAM(S): 17 POWDER, FOR SOLUTION ORAL at 12:21

## 2021-02-02 RX ADMIN — Medication 81 MILLIGRAM(S): at 12:21

## 2021-02-02 RX ADMIN — METFORMIN HYDROCHLORIDE 500 MILLIGRAM(S): 850 TABLET ORAL at 08:10

## 2021-02-02 RX ADMIN — GABAPENTIN 400 MILLIGRAM(S): 400 CAPSULE ORAL at 12:21

## 2021-02-02 RX ADMIN — GABAPENTIN 400 MILLIGRAM(S): 400 CAPSULE ORAL at 05:22

## 2021-02-02 RX ADMIN — PANTOPRAZOLE SODIUM 40 MILLIGRAM(S): 20 TABLET, DELAYED RELEASE ORAL at 05:22

## 2021-02-02 RX ADMIN — AMLODIPINE BESYLATE 5 MILLIGRAM(S): 2.5 TABLET ORAL at 05:22

## 2021-02-02 RX ADMIN — CLOPIDOGREL BISULFATE 75 MILLIGRAM(S): 75 TABLET, FILM COATED ORAL at 12:21

## 2021-02-02 NOTE — PROGRESS NOTE ADULT - ASSESSMENT
ASSESSMENT/PLAN  65 year old RH past medical history of HTN, DM, HLD, peripheral artery stents, memory loss who presents with inability to ambulate and lower extremity weakness due to transverse myeiltis with abnormal signal at T10 on MRI s/p steroid therapy  with Gait Instability, ADL impairments and Functional impairments.    COMORBIDITES/ACTIVE MEDICAL ISSUES     Gait Instability, ADL impairments and Functional impairments: start Comprehensive Rehab Program of PT/OT     #Transverse Myelitis (T10)  - s/p methylprednisolone therapy x 3 days, likely cause of leukocytosis  - f/u NMO ab  - Bowel Mgmt -  constipation in admission now improved with bowel regimen: miralax, dulcolax suppository  - GI ppx with PPI  - Bladder: PVRs negative    #Mood  - on aricept    #HTN/HLD/PAD with stent  - ASA/ plavix  - norvasc  - statin     #DM  - controlled on metformin   - will taper during course  - ISS    -Pain control: Tylenol PRN, gabapentin. Increase gabapentin to 300 TID.   - DVT: Lovenox, SCDs, TEDs     FEN   - Diet - Regular + Thins  [CCHO, DASH/TLC]      Precautions / PROPHYLAXIS:   - Falls  - ortho: Weight bearing status: full  - Lungs: Aspiration, Incentive Spirometer   - Pressure injury/Skin: Turn Q2hrs while in bed, OOB to Chair, PT/OT

## 2021-02-02 NOTE — DISCHARGE NOTE NURSING/CASE MANAGEMENT/SOCIAL WORK - PATIENT PORTAL LINK FT
You can access the FollowMyHealth Patient Portal offered by [facility] by registering at the following website: [email or web address]. By joining [facility]’s FollowMyHealth portal, you will also be able to view your health information using other applications (apps) compatible with our system.

## 2021-02-02 NOTE — PROGRESS NOTE ADULT - REASON FOR ADMISSION
transverse myelitis
transverse myelitis  04.130 Other Non-Traumatic Spinal Cord Dysfunction
transverse myelitis  04.130 Other Non-Traumatic Spinal Cord Dysfunction
transverse myelitis
transverse myelitis  04.130 Other Non-Traumatic Spinal Cord Dysfunction
transverse myelitis
transverse myelitis  04.130 Other Non-Traumatic Spinal Cord Dysfunction
transverse myelitis
transverse myelitis  04.130 Other Non-Traumatic Spinal Cord Dysfunction
transverse myelitis  04.130 Other Non-Traumatic Spinal Cord Dysfunction
transverse myelitis
transverse myelitis

## 2021-02-02 NOTE — PROGRESS NOTE ADULT - PROVIDER SPECIALTY LIST ADULT
Hospitalist
Rehab Medicine
Hospitalist
Physiatry
Hospitalist
Rehab Medicine
Hospitalist
Physiatry
Rehab Medicine
Rehab Medicine

## 2021-02-02 NOTE — PROGRESS NOTE ADULT - SUBJECTIVE AND OBJECTIVE BOX
65 year old female s/p transverse myelitis with abnormal signal at T10 on MRI s/p steroid therapy.    seen at the bedside,  feels well, no n/v, no sob. no abdominal pain  no dysuria, no dizziness.       Vital Signs Last 24 Hrs  T(C): 36.6 (02 Feb 2021 09:32), Max: 36.6 (01 Feb 2021 21:53)  T(F): 97.8 (02 Feb 2021 09:32), Max: 97.8 (01 Feb 2021 21:53)  HR: 74 (02 Feb 2021 09:32) (67 - 81)  BP: 122/67 (02 Feb 2021 09:32) (115/68 - 134/72)  BP(mean): --  RR: 15 (02 Feb 2021 09:32) (14 - 15)  SpO2: 96% (02 Feb 2021 09:32) (96% - 99%)    GENERAL- NAD  EAR/NOSE/MOUTH/THROAT - no pharyngeal exudates, no oral lesions  MMM  EYES- CHRYSTAL, conjunctiva and Sclera clear  NECK- supple  RESPIRATORY-  clear to auscultation bilaterally  CARDIOVASCULAR - SIS2, RRR  GI - soft NT BS present  EXTREMITIES- no pedal edema  NEUROLOGY- right LE weakness  SKIN- no rashes, warm to touch  PSYCHIATRY- AAO X 3  MUSCULOSKELETAL- PROM normal                   12.6                 140  | 27   | 18           9.16  >-----------< 226     ------------------------< 130                   39.2                 4.1  | 105  | 0.84                                         Ca 9.5   Mg x     Ph x        MEDICATIONS  (STANDING):  amLODIPine   Tablet 5 milliGRAM(s) Oral daily  aspirin  chewable 81 milliGRAM(s) Oral daily  atorvastatin 80 milliGRAM(s) Oral at bedtime  clopidogrel Tablet 75 milliGRAM(s) Oral daily  donepezil 10 milliGRAM(s) Oral at bedtime  enoxaparin Injectable 40 milliGRAM(s) SubCutaneous at bedtime  gabapentin 400 milliGRAM(s) Oral three times a day  glucagon  Injectable 1 milliGRAM(s) IntraMuscular once  insulin lispro (ADMELOG) corrective regimen sliding scale   SubCutaneous three times a day before meals  metFORMIN 500 milliGRAM(s) Oral two times a day with meals  pantoprazole    Tablet 40 milliGRAM(s) Oral before breakfast  polyethylene glycol 3350 17 Gram(s) Oral <User Schedule>    MEDICATIONS  (PRN):  acetaminophen   Tablet .. 650 milliGRAM(s) Oral every 6 hours PRN Temp greater or equal to 38C (100.4F), Mild Pain (1 - 3)  bisacodyl Suppository 10 milliGRAM(s) Rectal daily PRN Constipation  
65 year old female s/p transverse myelitis with abnormal signal at T10 on MRI s/p steroid therapy.    seen at the bedside, no new complaints, feels well, no n/v, no sob.  no dysuria, no dizziness.     Vital Signs Last 24 Hrs  T(C): 36.6 (23 Jan 2021 09:48), Max: 36.7 (22 Jan 2021 20:00)  T(F): 97.8 (23 Jan 2021 09:48), Max: 98 (22 Jan 2021 20:00)  HR: 86 (23 Jan 2021 09:48) (65 - 86)  BP: 110/73 (23 Jan 2021 09:48) (110/73 - 118/65)  BP(mean): --  RR: 15 (23 Jan 2021 09:48) (14 - 15)  SpO2: 95% (23 Jan 2021 09:48) (95% - 97%)    GENERAL- NAD  EAR/NOSE/MOUTH/THROAT - no pharyngeal exudates, no oral lesions  MMM  EYES- CHRYSTAL, conjunctiva and Sclera clear  NECK- supple  RESPIRATORY-  clear to auscultation bilaterally  CARDIOVASCULAR - SIS2, RRR  GI - soft NT BS present  EXTREMITIES- no pedal edema  NEUROLOGY- right LE weakness  SKIN- no rashes, warm to touch  PSYCHIATRY- AAO X 3  MUSCULOSKELETAL- PROM normal       CAPILLARY BLOOD GLUCOSE      POCT Blood Glucose.: 155 mg/dL (23 Jan 2021 07:45)  POCT Blood Glucose.: 182 mg/dL (22 Jan 2021 21:10)  POCT Blood Glucose.: 144 mg/dL (22 Jan 2021 16:39)  POCT Blood Glucose.: 290 mg/dL (22 Jan 2021 11:59)    MEDICATIONS  (STANDING):  amLODIPine   Tablet 5 milliGRAM(s) Oral daily  aspirin  chewable 81 milliGRAM(s) Oral daily  atorvastatin 80 milliGRAM(s) Oral at bedtime  bisacodyl Suppository 10 milliGRAM(s) Rectal daily  clopidogrel Tablet 75 milliGRAM(s) Oral daily  donepezil 10 milliGRAM(s) Oral at bedtime  enoxaparin Injectable 40 milliGRAM(s) SubCutaneous at bedtime  gabapentin 300 milliGRAM(s) Oral three times a day  glucagon  Injectable 1 milliGRAM(s) IntraMuscular once  insulin glargine Injectable (LANTUS) 10 Unit(s) SubCutaneous at bedtime  insulin lispro (ADMELOG) corrective regimen sliding scale   SubCutaneous three times a day before meals  insulin lispro (ADMELOG) corrective regimen sliding scale   SubCutaneous at bedtime  metFORMIN 500 milliGRAM(s) Oral two times a day with meals  pantoprazole    Tablet 40 milliGRAM(s) Oral before breakfast  polyethylene glycol 3350 17 Gram(s) Oral two times a day  senna 2 Tablet(s) Oral at bedtime    MEDICATIONS  (PRN):  acetaminophen   Tablet .. 650 milliGRAM(s) Oral every 6 hours PRN Temp greater or equal to 38C (100.4F), Mild Pain (1 - 3)  
Patient is a 65y old  Female who presents with a chief complaint of transverse myelitis (20 Jan 2021 15:00)      HPI:  65 year old RH Danish speaking female with a past medical history of HTN, DM, HLD, peripheral artery stents, and memory loss who presented to [facility] on 1/11 with inability to ambulate and lower extremity weakness. At baseline, the patient states that she is able to ambulate without any assistive devices but does so slowly. Approximately 1 week PTA she began to feel a pain in her lower abdomen that wrapped around her stomach and radiated to her back bilaterally at approximately the belly button level. She stated that the pain was uncomfortable but was not associated with any weakness and she was still able to perform her daily activities. Did not have any nausea/vomiting at that time. Pain was present for several days and then 3 days PTA she noted that she began to have lower extremity weakness which was worse in her RLE. Over that time, she noted that it was difficult for her to get up and ambulate and she required assistance from her family in order to walk. She then presented to the ED (1/11) for further evaluation. Patient notes that she feels that she has reduced sensation in her lower extremities. Denies head trauma. Denies any symptoms in her upper extremities, or face. Denies headaches, dizziness, lightheadedness, dysphagia, dysarthria, difficulty expressing herself. Denies any recent illnesses, sick contacts, animal exposure or bites. States that she received a flu vaccine in November and has not had any vaccinations since then. No exposure to COVID-19.     Patient received CT head (microvascular disease volume loss) and MR lumbosacral spine concerning for abnormal signal intensity at T9-T11, prompting further spinal imaging and lumbar puncture to rule out other etiologies for abnormal signal intensity. Patient arrived to the floor in stable condition. MR cervical and thoracic spine revealed  focus of abnormal signal at T10 that does not enhance. CSF studies showed 6 cells, protein of 78, negative PCR, and negative flow cytometry. NMO and MOG serum studies were sent. Patient was treated with a three day course of methylprednisolone (1/15-1/17).   Patient was deemed medically stable and discharged to [facility] on 1/19.    (19 Jan 2021 15:03)      TODAY'S SUBJECTIVE & REVIEW OF SYMPTOMS:      Patient seen and evaluated this morning. No acute events overnight. Participating well in therapy. Pain is well controlled.  lept well last night. Tolerating gabapentin well. Reports mutliple bowel movements yesterday and today, non-watery, difficulty in therapy.   low PVR , on scheduled toileting.     [X] Constiutional WNL        [X] Cardio WNL              [X] Resp WNL  [X] GI WNL                            [X]  WNL                    [X] Heme WNL  [X] Endo WNL                       [X] Skin WNL                  [X] MSK WNL  [X] Neuro WNL                     [X] Cognitive WNL         [X] Psych WNL      PHYSICAL EXAM     Vital Signs Last 24 Hrs  T(C): 36.6 (25 Jan 2021 08:47), Max: 36.7 (24 Jan 2021 20:10)  T(F): 97.9 (25 Jan 2021 08:47), Max: 98.1 (24 Jan 2021 20:10)  HR: 77 (25 Jan 2021 08:47) (74 - 80)  BP: 121/60 (25 Jan 2021 08:47) (121/60 - 126/73)  BP(mean): --  RR: 16 (25 Jan 2021 08:47) (16 - 16)  SpO2: 97% (25 Jan 2021 08:47) (96% - 99%)     General: NAD, Resting Comfortable,                                  HEENT: NC/AT, EOM I, PERRLA, Normal Conjunctivae  Cardio: RRR, Normal S1-S2, No M/G/R                              Pulm: No Respiratory Distress,  Lungs CTAB                        Abdomen: NT, Soft, BS+, abdomen distended                                                MSK: No joint swelling, Full ROM                                         Ext: No C/C/E, Pulses 2+ throughout, No calf tenderness    Skin:  all skin intact, ecchymosis on the left thigh (site of lovenox injection as per patient)                                                                Neurological Examination    Cognitive: AAO x 3                                                                         Mood/Affect: wnl                                                                           Communication:  Fluent                                                                              Sensory: diminished to light touch on the abdomen below the T7 - L1 level bilaterally and right L2-S1, intact on left L2-s1 on the left                                                                               Tone: normal Throughout     Motor    LEFT    UE: SF [5/5], EF [5/5], EE [5/5], WE [5/5],  [wnl]  RIGHT UE: SF [5/5], EF [5/5], EE [5/5], WE [5/5],  [wnl]  LEFT    LE:  HF [5/5], KE [5/5], DF [5/5], EHL [5/5],  PF [5/5]  RIGHT LE:  HF [4/5], KE [5/5], DF [4/5], EHL [3/5],  PF [4/5]      Reflex:  2 + thoroughout, Garcia/Babinski negative    RECENT LABS/IMAGING                                        12.8   9.24  )-----------( 260      ( 25 Jan 2021 06:52 )             38.5     01-25    138  |  104  |  21  ----------------------------<  98  4.2   |  26  |  0.78    Ca    9.1      25 Jan 2021 06:52    TPro  6.2  /  Alb  2.8<L>  /  TBili  0.4  /  DBili  x   /  AST  20  /  ALT  44  /  AlkPhos  95  01-25        
65 year old female s/p transverse myelitis with abnormal signal at T10 on MRI s/p steroid therapy.    seen at the bedside,  feels well, no n/v, no sob. no abdominal pain  no dysuria, no dizziness.       Vital Signs Last 24 Hrs  T(C): 36.6 (27 Jan 2021 08:29), Max: 36.7 (26 Jan 2021 21:14)  T(F): 97.8 (27 Jan 2021 08:29), Max: 98 (26 Jan 2021 21:14)  HR: 71 (27 Jan 2021 08:29) (71 - 73)  BP: 127/65 (27 Jan 2021 08:29) (110/66 - 127/65)  BP(mean): --  RR: 15 (27 Jan 2021 08:29) (15 - 15)  SpO2: 97% (27 Jan 2021 08:29) (97% - 99%)    GENERAL- NAD  EAR/NOSE/MOUTH/THROAT - no pharyngeal exudates, no oral lesions  MMM  EYES- CHRYSTAL, conjunctiva and Sclera clear  NECK- supple  RESPIRATORY-  clear to auscultation bilaterally  CARDIOVASCULAR - SIS2, RRR  GI - soft NT BS present  EXTREMITIES- no pedal edema  NEUROLOGY- right LE weakness  SKIN- no rashes, warm to touch  PSYCHIATRY- AAO X 3  MUSCULOSKELETAL- PROM normal    CAPILLARY BLOOD GLUCOSE      POCT Blood Glucose.: 129 mg/dL (27 Jan 2021 07:39)  POCT Blood Glucose.: 158 mg/dL (26 Jan 2021 20:50)  POCT Blood Glucose.: 201 mg/dL (26 Jan 2021 17:08)  POCT Blood Glucose.: 149 mg/dL (26 Jan 2021 11:40)    MEDICATIONS  (STANDING):  amLODIPine   Tablet 5 milliGRAM(s) Oral daily  aspirin  chewable 81 milliGRAM(s) Oral daily  atorvastatin 80 milliGRAM(s) Oral at bedtime  clopidogrel Tablet 75 milliGRAM(s) Oral daily  donepezil 10 milliGRAM(s) Oral at bedtime  enoxaparin Injectable 40 milliGRAM(s) SubCutaneous at bedtime  gabapentin 400 milliGRAM(s) Oral three times a day  glucagon  Injectable 1 milliGRAM(s) IntraMuscular once  insulin lispro (ADMELOG) corrective regimen sliding scale   SubCutaneous three times a day before meals  insulin lispro (ADMELOG) corrective regimen sliding scale   SubCutaneous at bedtime  metFORMIN 500 milliGRAM(s) Oral two times a day with meals  pantoprazole    Tablet 40 milliGRAM(s) Oral before breakfast    MEDICATIONS  (PRN):  acetaminophen   Tablet .. 650 milliGRAM(s) Oral every 6 hours PRN Temp greater or equal to 38C (100.4F), Mild Pain (1 - 3)  bisacodyl Suppository 10 milliGRAM(s) Rectal daily PRN Constipation    
65 year old female s/p transverse myelitis with abnormal signal at T10 on MRI s/p steroid therapy.    seen at the bedside,  feels well, no n/v, no sob. no abdominal pain  no dysuria, no dizziness.     Vital Signs Last 24 Hrs  T(C): 36.4 (01 Feb 2021 07:52), Max: 36.7 (01 Feb 2021 05:54)  T(F): 97.6 (01 Feb 2021 07:52), Max: 98 (01 Feb 2021 05:54)  HR: 77 (01 Feb 2021 07:52) (67 - 85)  BP: 120/71 (01 Feb 2021 07:52) (117/69 - 123/70)  BP(mean): --  RR: 14 (01 Feb 2021 07:52) (14 - 15)  SpO2: 99% (01 Feb 2021 07:52) (97% - 99%)    GENERAL- NAD  EAR/NOSE/MOUTH/THROAT - no pharyngeal exudates, no oral lesions  MMM  EYES- CHRYSTAL, conjunctiva and Sclera clear  NECK- supple  RESPIRATORY-  clear to auscultation bilaterally  CARDIOVASCULAR - SIS2, RRR  GI - soft NT BS present  EXTREMITIES- no pedal edema  NEUROLOGY- right LE weakness  SKIN- no rashes, warm to touch  PSYCHIATRY- AAO X 3  MUSCULOSKELETAL- PROM normal                12.6                 140  | 27   | 18           9.16  >-----------< 226     ------------------------< 130                   39.2                 4.1  | 105  | 0.84                                         Ca 9.5   Mg x     Ph x          MEDICATIONS  (STANDING):  amLODIPine Tablet 5 milliGRAM(s) Oral daily  aspirin  chewable 81 milliGRAM(s) Oral daily  atorvastatin 80 milliGRAM(s) Oral at bedtime  clopidogrel Tablet 75 milliGRAM(s) Oral daily  donepezil 10 milliGRAM(s) Oral at bedtime  enoxaparin Injectable 40 milliGRAM(s) SubCutaneous at bedtime  gabapentin 400 milliGRAM(s) Oral three times a day  glucagon  Injectable 1 milliGRAM(s) IntraMuscular once  insulin lispro (ADMELOG) corrective regimen sliding scale   SubCutaneous three times a day before meals  metFORMIN 500 milliGRAM(s) Oral two times a day with meals  pantoprazole    Tablet 40 milliGRAM(s) Oral before breakfast  polyethylene glycol 3350 17 Gram(s) Oral <User Schedule>    MEDICATIONS  (PRN):  acetaminophen   Tablet .. 650 milliGRAM(s) Oral every 6 hours PRN Temp greater or equal to 38C (100.4F), Mild Pain (1 - 3)  bisacodyl Suppository 10 milliGRAM(s) Rectal daily PRN Constipation  
No overnight events.  Slept well.  Reports pain in back is controlled.  Pain controlled, no chest pain, no N/V, no Fevers/Chills. No other new ROS.  Has been tolerating rehabilitation program.    VITALS  T(C): 36.9 (01-31-21 @ 06:10), Max: 36.9 (01-31-21 @ 06:10)  HR: 68 (01-31-21 @ 06:10) (63 - 68)  BP: 113/65 (01-31-21 @ 06:10) (113/65 - 132/69)  RR: 14 (01-31-21 @ 06:10) (14 - 14)  SpO2: 98% (01-31-21 @ 06:10) (97% - 98%)  Wt(kg): --     MEDICATIONS   acetaminophen   Tablet .. 650 milliGRAM(s) every 6 hours PRN  amLODIPine   Tablet 5 milliGRAM(s) daily  aspirin  chewable 81 milliGRAM(s) daily  atorvastatin 80 milliGRAM(s) at bedtime  bisacodyl Suppository 10 milliGRAM(s) daily PRN  clopidogrel Tablet 75 milliGRAM(s) daily  donepezil 10 milliGRAM(s) at bedtime  enoxaparin Injectable 40 milliGRAM(s) at bedtime  gabapentin 400 milliGRAM(s) three times a day  glucagon  Injectable 1 milliGRAM(s) once  insulin lispro (ADMELOG) corrective regimen sliding scale   three times a day before meals  metFORMIN 500 milliGRAM(s) two times a day with meals  pantoprazole    Tablet 40 milliGRAM(s) before breakfast  polyethylene glycol 3350 17 Gram(s) <User Schedule>      RECENT LABS/IMAGING                    POCT Blood Glucose.: 146 mg/dL (01-31-21 @ 07:46)  POCT Blood Glucose.: 152 mg/dL (01-30-21 @ 17:19)  POCT Blood Glucose.: 134 mg/dL (01-30-21 @ 12:55)    ---------  PHYSICAL EXAM  Constitutional - NAD, Comfortable  Pulm - Breathing comfortably, No wheezing  Abd - Nondistended  Extremities - No calf tenderness  Neurologic Exam - Awake, Alert            Motor - Exam unchanged  Psychiatric - Mood WNL     ASSESSMENT/PLAN  65y Female with impairments in mobility and ADLs   - Continue 3hrs a day of comprehensive rehab program  - Continue current medications, medically stable  - DVT prophylaxis - SCD, Lovenox  - Skin - OOB and mobilization daily 
No overnight events.  Slept well.  Reports pain is controlled.  Still waiting on BM, had a tiny one.  RN aware, patient will wait to give another dose.   She wants to try Miralax again.     REVIEW OF SYSTEMS  Constitutional - No fever,  No fatigue  Musculoskeletal - No joint pain, No joint swelling, No muscle pain    VITALS  T(C): 36.8 (01-30-21 @ 08:06), Max: 36.8 (01-30-21 @ 08:06)  HR: 75 (01-30-21 @ 08:06) (75 - 87)  BP: 130/59 (01-30-21 @ 08:06) (117/67 - 138/71)  RR: 14 (01-30-21 @ 08:06) (14 - 16)  SpO2: 98% (01-30-21 @ 08:06) (98% - 98%)  Wt(kg): --     MEDICATIONS   acetaminophen   Tablet .. 650 milliGRAM(s) every 6 hours PRN  amLODIPine   Tablet 5 milliGRAM(s) daily  aspirin  chewable 81 milliGRAM(s) daily  atorvastatin 80 milliGRAM(s) at bedtime  bisacodyl Suppository 10 milliGRAM(s) daily PRN  clopidogrel Tablet 75 milliGRAM(s) daily  donepezil 10 milliGRAM(s) at bedtime  enoxaparin Injectable 40 milliGRAM(s) at bedtime  gabapentin 400 milliGRAM(s) three times a day  glucagon  Injectable 1 milliGRAM(s) once  insulin lispro (ADMELOG) corrective regimen sliding scale   three times a day before meals  metFORMIN 500 milliGRAM(s) two times a day with meals  pantoprazole    Tablet 40 milliGRAM(s) before breakfast  polyethylene glycol 3350 17 Gram(s) <User Schedule>      RECENT LABS/IMAGING                    POCT Blood Glucose.: 174 mg/dL (01-30-21 @ 07:58)    ---------  PHYSICAL EXAM  Constitutional - NAD, Comfortable  Pulm - Breathing comfortably, No wheezing  Abd - Nondistended  Extremities - No calf tenderness  Neurologic Exam - Awake, Alert            Motor - Exam unchanged  Psychiatric - Mood WNL     ASSESSMENT/PLAN  65y Female with impairments in mobility and ADLs   - Continue 3hrs a day of comprehensive rehab program  - Continue current medications, medically stable  - DVT prophylaxis - SCD, Lovenox  - Skin - OOB and mobilization daily     
Patient is a 65y old  Female who presents with a chief complaint of transverse myelitis (20 Jan 2021 15:00)      HPI:  65 year old RH Angolan speaking female with a past medical history of HTN, DM, HLD, peripheral artery stents, and memory loss who presented to [facility] on 1/11 with inability to ambulate and lower extremity weakness. At baseline, the patient states that she is able to ambulate without any assistive devices but does so slowly. Approximately 1 week PTA she began to feel a pain in her lower abdomen that wrapped around her stomach and radiated to her back bilaterally at approximately the belly button level. She stated that the pain was uncomfortable but was not associated with any weakness and she was still able to perform her daily activities. Did not have any nausea/vomiting at that time. Pain was present for several days and then 3 days PTA she noted that she began to have lower extremity weakness which was worse in her RLE. Over that time, she noted that it was difficult for her to get up and ambulate and she required assistance from her family in order to walk. She then presented to the ED (1/11) for further evaluation. Patient notes that she feels that she has reduced sensation in her lower extremities. Denies head trauma. Denies any symptoms in her upper extremities, or face. Denies headaches, dizziness, lightheadedness, dysphagia, dysarthria, difficulty expressing herself. Denies any recent illnesses, sick contacts, animal exposure or bites. States that she received a flu vaccine in November and has not had any vaccinations since then. No exposure to COVID-19.     Patient received CT head (microvascular disease volume loss) and MR lumbosacral spine concerning for abnormal signal intensity at T9-T11, prompting further spinal imaging and lumbar puncture to rule out other etiologies for abnormal signal intensity. Patient arrived to the floor in stable condition. MR cervical and thoracic spine revealed  focus of abnormal signal at T10 that does not enhance. CSF studies showed 6 cells, protein of 78, negative PCR, and negative flow cytometry. NMO and MOG serum studies were sent. Patient was treated with a three day course of methylprednisolone (1/15-1/17).   Patient was deemed medically stable and discharged to [facility] on 1/19.    (19 Jan 2021 15:03)      TODAY'S SUBJECTIVE & REVIEW OF SYMPTOMS:      Patient seen and evaluated this morning. No acute events overnight. Participating well in therapy. Pain is well controlled.  participating well in therapy.     [X] Constiutional WNL        [X] Cardio WNL              [X] Resp WNL  [X] GI WNL                            [X]  WNL                    [X] Heme WNL  [X] Endo WNL                       [X] Skin WNL                  [X] MSK WNL  [X] Neuro WNL                     [X] Cognitive WNL         [X] Psych WNL      PHYSICAL EXAM     Vital Signs Last 24 Hrs  T(C): 36.4 (01 Feb 2021 07:52), Max: 36.7 (01 Feb 2021 05:54)  T(F): 97.6 (01 Feb 2021 07:52), Max: 98 (01 Feb 2021 05:54)  HR: 77 (01 Feb 2021 07:52) (67 - 85)  BP: 120/71 (01 Feb 2021 07:52) (117/69 - 123/70)  BP(mean): --  RR: 14 (01 Feb 2021 07:52) (14 - 15)  SpO2: 99% (01 Feb 2021 07:52) (97% - 99%)      General: NAD, Resting Comfortable,                                  HEENT: NC/AT, EOM I, PERRLA, Normal Conjunctivae  Cardio: RRR, Normal S1-S2, No M/G/R                              Pulm: No Respiratory Distress,  Lungs CTAB                        Abdomen: NT, Soft, BS+, abdomen distended                                                MSK: No joint swelling, Full ROM                                         Ext: No C/C/E, Pulses 2+ throughout, No calf tenderness    Skin:  all skin intact, ecchymosis on the left thigh (site of lovenox injection as per patient)                                                                Neurological Examination    Cognitive: AAO x 3                                                                         Mood/Affect: wnl                                                                           Communication:  Fluent                                                                              Sensory: diminished to light touch on the abdomen below the T7 - L1 level bilaterally and right L2-S1, intact on left L2-s1 on the left                                                                               Tone: normal Throughout     Motor    LEFT    UE: SF [5/5], EF [5/5], EE [5/5], WE [5/5],  [wnl]  RIGHT UE: SF [5/5], EF [5/5], EE [5/5], WE [5/5],  [wnl]  LEFT    LE:  HF [5/5], KE [5/5], DF [5/5], EHL [5/5],  PF [5/5]  RIGHT LE:  HF [4/5], KE [5/5], DF [4/5], EHL [3/5],  PF [4/5]      Reflex:  2 + thoroughout, Garcia/Babinski negative    RECENT LABS/IMAGING                                  12.6   9.16  )-----------( 226      ( 01 Feb 2021 05:45 )             39.2     02-01    140  |  105  |  18  ----------------------------<  130<H>  4.1   |  27  |  0.84    Ca    9.5      01 Feb 2021 05:45    TPro  6.6  /  Alb  3.2<L>  /  TBili  0.4  /  DBili  x   /  AST  27  /  ALT  69<H>  /  AlkPhos  114  02-01                  
Patient is a 65y old  Female who presents with a chief complaint of transverse myelitis (20 Jan 2021 15:00)      HPI:  65 year old RH Cymro speaking female with a past medical history of HTN, DM, HLD, peripheral artery stents, and memory loss who presented to [facility] on 1/11 with inability to ambulate and lower extremity weakness. At baseline, the patient states that she is able to ambulate without any assistive devices but does so slowly. Approximately 1 week PTA she began to feel a pain in her lower abdomen that wrapped around her stomach and radiated to her back bilaterally at approximately the belly button level. She stated that the pain was uncomfortable but was not associated with any weakness and she was still able to perform her daily activities. Did not have any nausea/vomiting at that time. Pain was present for several days and then 3 days PTA she noted that she began to have lower extremity weakness which was worse in her RLE. Over that time, she noted that it was difficult for her to get up and ambulate and she required assistance from her family in order to walk. She then presented to the ED (1/11) for further evaluation. Patient notes that she feels that she has reduced sensation in her lower extremities. Denies head trauma. Denies any symptoms in her upper extremities, or face. Denies headaches, dizziness, lightheadedness, dysphagia, dysarthria, difficulty expressing herself. Denies any recent illnesses, sick contacts, animal exposure or bites. States that she received a flu vaccine in November and has not had any vaccinations since then. No exposure to COVID-19.     Patient received CT head (microvascular disease volume loss) and MR lumbosacral spine concerning for abnormal signal intensity at T9-T11, prompting further spinal imaging and lumbar puncture to rule out other etiologies for abnormal signal intensity. Patient arrived to the floor in stable condition. MR cervical and thoracic spine revealed  focus of abnormal signal at T10 that does not enhance. CSF studies showed 6 cells, protein of 78, negative PCR, and negative flow cytometry. NMO and MOG serum studies were sent. Patient was treated with a three day course of methylprednisolone (1/15-1/17).   Patient was deemed medically stable and discharged to [facility] on 1/19.    (19 Jan 2021 15:03)      TODAY'S SUBJECTIVE & REVIEW OF SYMPTOMS:      Patient seen and evaluated this morning. No acute events overnight. Participating well in therapy. Pain is well controlled. Denies chest pain, SOB, nausea, vomiting, constipation or diarrhea. Slept well last night. Low PVRs    [X] Constiutional WNL        [X] Cardio WNL              [X] Resp WNL  [X] GI WNL                            [X]  WNL                    [X] Heme WNL  [X] Endo WNL                       [X] Skin WNL                  [X] MSK WNL  [X] Neuro WNL                     [X] Cognitive WNL         [X] Psych WNL      PHYSICAL EXAM     Vital Signs Last 24 Hrs   Vital Signs Last 24 Hrs  T(C): 36.6 (27 Jan 2021 08:29), Max: 36.7 (26 Jan 2021 21:14)  T(F): 97.8 (27 Jan 2021 08:29), Max: 98 (26 Jan 2021 21:14)  HR: 71 (27 Jan 2021 08:29) (71 - 73)  BP: 127/65 (27 Jan 2021 08:29) (110/66 - 127/65)  BP(mean): --  RR: 15 (27 Jan 2021 08:29) (15 - 15)  SpO2: 97% (27 Jan 2021 08:29) (97% - 99%)     General: NAD, Resting Comfortable,                                  HEENT: NC/AT, EOM I, PERRLA, Normal Conjunctivae  Cardio: RRR, Normal S1-S2, No M/G/R                              Pulm: No Respiratory Distress,  Lungs CTAB                        Abdomen: NT, Soft, BS+, abdomen distended                                                MSK: No joint swelling, Full ROM                                         Ext: No C/C/E, Pulses 2+ throughout, No calf tenderness    Skin:  all skin intact, ecchymosis on the left thigh (site of lovenox injection as per patient)                                                                Neurological Examination    Cognitive: AAO x 3                                                                         Mood/Affect: wnl                                                                           Communication:  Fluent                                                                              Sensory: diminished to light touch on the abdomen below the T7 - L1 level bilaterally and right L2-S1, intact on left L2-s1 on the left                                                                               Tone: normal Throughout     Motor    LEFT    UE: SF [5/5], EF [5/5], EE [5/5], WE [5/5],  [wnl]  RIGHT UE: SF [5/5], EF [5/5], EE [5/5], WE [5/5],  [wnl]  LEFT    LE:  HF [5/5], KE [5/5], DF [5/5], EHL [5/5],  PF [5/5]  RIGHT LE:  HF [4/5], KE [5/5], DF [4/5], EHL [3/5],  PF [4/5]      Reflex:  2 + thoroughout, Garcia/Babinski negative    RECENT LABS/IMAGING                                 
Patient is a 65y old  Female who presents with a chief complaint of transverse myelitis (20 Jan 2021 15:00)      HPI:  65 year old RH O[place]i speaking female with a past medical history of HTN, DM, HLD, peripheral artery stents, and memory loss who presented to [facility] on 1/11 with inability to ambulate and lower extremity weakness. At baseline, the patient states that she is able to ambulate without any assistive devices but does so slowly. Approximately 1 week PTA she began to feel a pain in her lower abdomen that wrapped around her stomach and radiated to her back bilaterally at approximately the belly button level. She stated that the pain was uncomfortable but was not associated with any weakness and she was still able to perform her daily activities. Did not have any nausea/vomiting at that time. Pain was present for several days and then 3 days PTA she noted that she began to have lower extremity weakness which was worse in her RLE. Over that time, she noted that it was difficult for her to get up and ambulate and she required assistance from her family in order to walk. She then presented to the ED (1/11) for further evaluation. Patient notes that she feels that she has reduced sensation in her lower extremities. Denies head trauma. Denies any symptoms in her upper extremities, or face. Denies headaches, dizziness, lightheadedness, dysphagia, dysarthria, difficulty expressing herself. Denies any recent illnesses, sick contacts, animal exposure or bites. States that she received a flu vaccine in November and has not had any vaccinations since then. No exposure to COVID-19.     Patient received CT head (microvascular disease volume loss) and MR lumbosacral spine concerning for abnormal signal intensity at T9-T11, prompting further spinal imaging and lumbar puncture to rule out other etiologies for abnormal signal intensity. Patient arrived to the floor in stable condition. MR cervical and thoracic spine revealed  focus of abnormal signal at T10 that does not enhance. CSF studies showed 6 cells, protein of 78, negative PCR, and negative flow cytometry. NMO and MOG serum studies were sent. Patient was treated with a three day course of methylprednisolone (1/15-1/17).   Patient was deemed medically stable and discharged to [facility] on 1/19.    (19 Jan 2021 15:03)      TODAY'S SUBJECTIVE & REVIEW OF SYMPTOMS: Patient seen in room. Bladder: low PVR but incontinent. Last bowel movement day before, feels constipated, no nausea or vomiting.       [X] Constiutional WNL        [X] Cardio WNL              [X] Resp WNL  [X] GI WNL                            [X]  WNL                    [X] Heme WNL  [X] Endo WNL                       [X] Skin WNL                  [X] MSK WNL  [X] Neuro WNL                     [X] Cognitive WNL         [X] Psych WNL      PHYSICAL EXAM    Vital Signs Last 24 Hrs  T(C): 36.3 (21 Jan 2021 09:07), Max: 36.4 (20 Jan 2021 22:07)  T(F): 97.4 (21 Jan 2021 09:07), Max: 97.5 (20 Jan 2021 22:07)  HR: 67 (21 Jan 2021 09:07) (61 - 67)  BP: 132/74 (21 Jan 2021 09:07) (111/68 - 132/74)  BP(mean): --  RR: 18 (21 Jan 2021 09:07) (18 - 18)  SpO2: 97% (21 Jan 2021 09:07) (97% - 97%)       General: NAD, Resting Comfortable,                                  HEENT: NC/AT, EOM I, PERRLA, Normal Conjunctivae  Cardio: RRR, Normal S1-S2, No M/G/R                              Pulm: No Respiratory Distress,  Lungs CTAB                        Abdomen: NT, Soft, BS+, abdomen distended                                                MSK: No joint swelling, Full ROM                                         Ext: No C/C/E, Pulses 2+ throughout, No calf tenderness    Skin:  all skin intact, ecchymosis on the left thigh (site of lovenox injection as per patient)                                                                Neurological Examination    Cognitive: AAO x 3                                                                         Mood/Affect: wnl                                                                           Communication:  Fluent                                                                              Sensory: diminished to light touch on the abdomen below the T7 - L1 level bilaterally and right L2-S1, intact on left L2-s1 on the left                                                                               Tone: normal Throughout     Motor    LEFT    UE: SF [5/5], EF [5/5], EE [5/5], WE [5/5],  [wnl]  RIGHT UE: SF [5/5], EF [5/5], EE [5/5], WE [5/5],  [wnl]  LEFT    LE:  HF [5/5], KE [5/5], DF [5/5], EHL [5/5],  PF [5/5]  RIGHT LE:  HF [4/5], KE [5/5], DF [4/5], EHL [3/5],  PF [4/5]      Reflex:  2 + thoroughout, Garcia/Babinski negative    RECENT LABS/IMAGING                        13.6   10.65 )-----------( 257      ( 21 Jan 2021 06:00 )             41.8     01-21    139  |  104  |  26<H>  ----------------------------<  160<H>  4.9   |  31  |  0.73    Ca    9.1      21 Jan 2021 06:00    TPro  6.1  /  Alb  2.9<L>  /  TBili  0.4  /  DBili  x   /  AST  15  /  ALT  45  /  AlkPhos  97  01-21          ASSESSMENT/PLAN    NATALIE MAGAÑA is a 64yo Female with _____    # Gait Instability, ADL impairments and Functional impairments: continue Comprehensive Rehab Program of PT/OT         
    65 year old female s/p transverse myelitis with abnormal signal at T10 on MRI s/p steroid therapy.    seen at the bedside, no new complaints, feels well, no n/v, no sob.   says she is able to move her right leg better now.   no dysuria, no dizziness.     Vital Signs Last 24 Hrs  T(C): 36.8 (24 Jan 2021 08:28), Max: 36.8 (23 Jan 2021 19:50)  T(F): 98.3 (24 Jan 2021 08:28), Max: 98.3 (24 Jan 2021 08:28)  HR: 77 (24 Jan 2021 08:28) (73 - 86)  BP: 105/67 (24 Jan 2021 08:28) (100/62 - 110/73)  BP(mean): --  RR: 16 (24 Jan 2021 08:28) (15 - 16)  SpO2: 97% (24 Jan 2021 08:28) (95% - 97%)    GENERAL- NAD  EAR/NOSE/MOUTH/THROAT - no pharyngeal exudates, no oral lesions  MMM  EYES- CHRYSTAL, conjunctiva and Sclera clear  NECK- supple  RESPIRATORY-  clear to auscultation bilaterally  CARDIOVASCULAR - SIS2, RRR  GI - soft NT BS present  EXTREMITIES- no pedal edema  NEUROLOGY- right LE weakness  SKIN- no rashes, warm to touch  PSYCHIATRY- AAO X 3  MUSCULOSKELETAL- PROM normal      CAPILLARY BLOOD GLUCOSE      POCT Blood Glucose.: 84 mg/dL (24 Jan 2021 07:47)  POCT Blood Glucose.: 156 mg/dL (23 Jan 2021 21:33)  POCT Blood Glucose.: 210 mg/dL (23 Jan 2021 16:38)  POCT Blood Glucose.: 132 mg/dL (23 Jan 2021 12:14)      MEDICATIONS  (STANDING):  amLODIPine   Tablet 5 milliGRAM(s) Oral daily  aspirin  chewable 81 milliGRAM(s) Oral daily  atorvastatin 80 milliGRAM(s) Oral at bedtime  bisacodyl Suppository 10 milliGRAM(s) Rectal daily  clopidogrel Tablet 75 milliGRAM(s) Oral daily  dextrose 40% Gel 15 Gram(s) Oral once  dextrose 5%. 1000 milliLiter(s) (50 mL/Hr) IV Continuous <Continuous>  dextrose 5%. 1000 milliLiter(s) (100 mL/Hr) IV Continuous <Continuous>  dextrose 50% Injectable 25 Gram(s) IV Push once  dextrose 50% Injectable 12.5 Gram(s) IV Push once  dextrose 50% Injectable 25 Gram(s) IV Push once  donepezil 10 milliGRAM(s) Oral at bedtime  enoxaparin Injectable 40 milliGRAM(s) SubCutaneous at bedtime  gabapentin 300 milliGRAM(s) Oral three times a day  glucagon  Injectable 1 milliGRAM(s) IntraMuscular once  insulin glargine Injectable (LANTUS) 7 Unit(s) SubCutaneous at bedtime  insulin lispro (ADMELOG) corrective regimen sliding scale   SubCutaneous three times a day before meals  insulin lispro (ADMELOG) corrective regimen sliding scale   SubCutaneous at bedtime  metFORMIN 850 milliGRAM(s) Oral two times a day with meals  pantoprazole    Tablet 40 milliGRAM(s) Oral before breakfast  polyethylene glycol 3350 17 Gram(s) Oral two times a day  senna 2 Tablet(s) Oral at bedtime    MEDICATIONS  (PRN):  acetaminophen   Tablet .. 650 milliGRAM(s) Oral every 6 hours PRN Temp greater or equal to 38C (100.4F), Mild Pain (1 - 3)  
HPI:  65 year old female with a past medical history of HTN, DM, HLD, peripheral artery stents, and memory loss who presented to [facility] on 1/11 with inability to ambulate and lower extremity weakness.   Patient received CT head (microvascular disease volume loss) and MR lumbosacral spine concerning for abnormal signal intensity at T9-T11, prompting further spinal imaging and lumbar puncture to rule out other etiologies for abnormal signal intensity. MR cervical and thoracic spine revealed  focus of abnormal signal at T10 that does not enhance. Had LP   She was treated with a three day course of methylprednisolone (1/15-1/17). Patient was deemed medically stable and discharged to [facility] on 1/19.          TODAY'S SUBJECTIVE & REVIEW OF SYMPTOMS  Patient seen at bedside  States that she feels ok  Per CNA- bowel movement watery  Patient reports some lower  abdominal pain and back pain   Denies HA/CP/palpitations/ nausea      PHYSICAL EXAM    Vital Signs Last 24 Hrs  T(C): 36.6 (23 Jan 2021 09:48), Max: 36.7 (22 Jan 2021 20:00)  T(F): 97.8 (23 Jan 2021 09:48), Max: 98 (22 Jan 2021 20:00)  HR: 86 (23 Jan 2021 09:48) (65 - 86)  BP: 110/73 (23 Jan 2021 09:48) (110/73 - 118/65)  BP(mean): --  RR: 15 (23 Jan 2021 09:48) (14 - 15)  SpO2: 95% (23 Jan 2021 09:48) (95% - 97%)    Constitutional - NAD, Comfortable  Chest - breathing comfortably  Cardiovascular - S1S2  Abdomen -  Soft, mild distension,   Extremities - No C/C/E, No calf tenderness   Right LE weaker compared to left UE 5/5      MEDICATIONS  (STANDING):  amLODIPine   Tablet 5 milliGRAM(s) Oral daily  aspirin  chewable 81 milliGRAM(s) Oral daily  atorvastatin 80 milliGRAM(s) Oral at bedtime  bisacodyl Suppository 10 milliGRAM(s) Rectal daily  clopidogrel Tablet 75 milliGRAM(s) Oral daily  dextrose 40% Gel 15 Gram(s) Oral once  dextrose 5%. 1000 milliLiter(s) (50 mL/Hr) IV Continuous <Continuous>  dextrose 5%. 1000 milliLiter(s) (100 mL/Hr) IV Continuous <Continuous>  dextrose 50% Injectable 25 Gram(s) IV Push once  dextrose 50% Injectable 12.5 Gram(s) IV Push once  dextrose 50% Injectable 25 Gram(s) IV Push once  donepezil 10 milliGRAM(s) Oral at bedtime  enoxaparin Injectable 40 milliGRAM(s) SubCutaneous at bedtime  gabapentin 300 milliGRAM(s) Oral three times a day  glucagon  Injectable 1 milliGRAM(s) IntraMuscular once  insulin glargine Injectable (LANTUS) 7 Unit(s) SubCutaneous at bedtime  insulin lispro (ADMELOG) corrective regimen sliding scale   SubCutaneous three times a day before meals  insulin lispro (ADMELOG) corrective regimen sliding scale   SubCutaneous at bedtime  metFORMIN 850 milliGRAM(s) Oral two times a day with meals  pantoprazole    Tablet 40 milliGRAM(s) Oral before breakfast  polyethylene glycol 3350 17 Gram(s) Oral two times a day  senna 2 Tablet(s) Oral at bedtime    MEDICATIONS  (PRN):  acetaminophen   Tablet .. 650 milliGRAM(s) Oral every 6 hours PRN Temp greater or equal to 38C (100.4F), Mild Pain (1 - 3)      CAPILLARY BLOOD GLUCOSE      POCT Blood Glucose.: 210 mg/dL (23 Jan 2021 16:38)  POCT Blood Glucose.: 132 mg/dL (23 Jan 2021 12:14)  POCT Blood Glucose.: 155 mg/dL (23 Jan 2021 07:45)  POCT Blood Glucose.: 182 mg/dL (22 Jan 2021 21:10)      
Hospitalist: Zofia Mead DO    CHIEF COMPLAINT: Patient is a 65y old  female who presents with a chief complaint of transverse myelitis  04.130 Other Non-Traumatic Spinal Cord Dysfunction (30 Jan 2021 15:29)    SUBJECTIVE / OVERNIGHT EVENTS: Patient seen and examined. No acute events overnight. Pain well controlled and patient without any complaints.    MEDICATIONS  (STANDING):  amLODIPine   Tablet 5 milliGRAM(s) Oral daily  aspirin  chewable 81 milliGRAM(s) Oral daily  atorvastatin 80 milliGRAM(s) Oral at bedtime  clopidogrel Tablet 75 milliGRAM(s) Oral daily  donepezil 10 milliGRAM(s) Oral at bedtime  enoxaparin Injectable 40 milliGRAM(s) SubCutaneous at bedtime  gabapentin 400 milliGRAM(s) Oral three times a day  glucagon  Injectable 1 milliGRAM(s) IntraMuscular once  insulin lispro (ADMELOG) corrective regimen sliding scale   SubCutaneous three times a day before meals  metFORMIN 500 milliGRAM(s) Oral two times a day with meals  pantoprazole    Tablet 40 milliGRAM(s) Oral before breakfast  polyethylene glycol 3350 17 Gram(s) Oral <User Schedule>    MEDICATIONS  (PRN):  acetaminophen   Tablet .. 650 milliGRAM(s) Oral every 6 hours PRN Temp greater or equal to 38C (100.4F), Mild Pain (1 - 3)  bisacodyl Suppository 10 milliGRAM(s) Rectal daily PRN Constipation      VITALS:  T(F): 97.8 (01-31-21 @ 08:58), Max: 98.4 (01-31-21 @ 06:10)  HR: 72 (01-31-21 @ 08:58) (63 - 72)  BP: 130/69 (01-31-21 @ 08:58) (113/65 - 132/69)  RR: 14 (01-31-21 @ 08:58) (14 - 14)  SpO2: 96% (01-31-21 @ 08:58)    Weight (kg): 61.2 (06:10)    REVIEW OF SYSTEMS:  For ROV please refer back to H&P     PHYSICAL EXAM:  GENERAL- NAD  EAR/NOSE/MOUTH/THROAT - no pharyngeal exudates, no oral lesions  MMM  EYES- CHRYSTAL, conjunctiva and Sclera clear  NECK- supple  RESPIRATORY-  clear to auscultation bilaterally  CARDIOVASCULAR - SIS2, RRR  GI - soft NT BS present  EXTREMITIES- no pedal edema  NEUROLOGY- right LE weakness  SKIN- no rashes, warm to touch  PSYCHIATRY- AAO X 3  MUSCULOSKELETAL- PROM normal     CAPILLARY BLOOD GLUCOSE      POCT Blood Glucose.: 178 mg/dL (31 Jan 2021 11:53)  POCT Blood Glucose.: 146 mg/dL (31 Jan 2021 07:46)  POCT Blood Glucose.: 152 mg/dL (30 Jan 2021 17:19)        MICROBIOLOGY:          RADIOLOGY & ADDITIONAL TESTS:    Imaging Personally Reviewed:    [X] Consultant(s) Notes Reviewed:  [X] Care Discussed with Consultants/Other Providers:  
65 year old female s/p transverse myelitis with abnormal signal at T10 on MRI s/p steroid therapy.    seen at the bedside,  feels well, no n/v, no sob. no abdominal pain  no dysuria, no dizziness.     Vital Signs Last 24 Hrs  T(C): 36.1 (29 Jan 2021 11:01), Max: 36.7 (28 Jan 2021 20:42)  T(F): 97 (29 Jan 2021 11:01), Max: 98 (28 Jan 2021 20:42)  HR: 82 (29 Jan 2021 11:01) (70 - 82)  BP: 127/68 (29 Jan 2021 11:01) (127/63 - 139/71)  BP(mean): --  RR: 16 (29 Jan 2021 11:01) (16 - 16)  SpO2: 98% (29 Jan 2021 11:01) (98% - 99%)    GENERAL- NAD  EAR/NOSE/MOUTH/THROAT - no pharyngeal exudates, no oral lesions  MMM  EYES- CHRYSTAL, conjunctiva and Sclera clear  NECK- supple  RESPIRATORY-  clear to auscultation bilaterally  CARDIOVASCULAR - SIS2, RRR  GI - soft NT BS present  EXTREMITIES- no pedal edema  NEUROLOGY- right LE weakness  SKIN- no rashes, warm to touch  PSYCHIATRY- AAO X 3  MUSCULOSKELETAL- PROM normal                  12.9                 141  | 27   | 12           8.96  >-----------< 246     ------------------------< 134                   38.7                 4.4  | 106  | 0.76                                         Ca 9.1   Mg x     Ph x          MEDICATIONS  (STANDING):  amLODIPine   Tablet 5 milliGRAM(s) Oral daily  aspirin  chewable 81 milliGRAM(s) Oral daily  atorvastatin 80 milliGRAM(s) Oral at bedtime  clopidogrel Tablet 75 milliGRAM(s) Oral daily  donepezil 10 milliGRAM(s) Oral at bedtime  enoxaparin Injectable 40 milliGRAM(s) SubCutaneous at bedtime  gabapentin 400 milliGRAM(s) Oral three times a day  glucagon  Injectable 1 milliGRAM(s) IntraMuscular once  insulin lispro (ADMELOG) corrective regimen sliding scale   SubCutaneous three times a day before meals  metFORMIN 500 milliGRAM(s) Oral two times a day with meals  mineral oil enema 133 milliLiter(s) Rectal <User Schedule>  pantoprazole    Tablet 40 milliGRAM(s) Oral before breakfast  polyethylene glycol 3350 17 Gram(s) Oral <User Schedule>    MEDICATIONS  (PRN):  acetaminophen   Tablet .. 650 milliGRAM(s) Oral every 6 hours PRN Temp greater or equal to 38C (100.4F), Mild Pain (1 - 3)  bisacodyl Suppository 10 milliGRAM(s) Rectal daily PRN Constipation  
65 year old female s/p transverse myelitis with abnormal signal at T10 on MRI s/p steroid therapy.    seen at the bedside,  feels well, no n/v, no sob. no abdominal pain  no dysuria, no dizziness.     Vital Signs Last 24 Hrs  T(C): 36.9 (28 Jan 2021 09:00), Max: 36.9 (28 Jan 2021 09:00)  T(F): 98.4 (28 Jan 2021 09:00), Max: 98.4 (28 Jan 2021 09:00)  HR: 70 (28 Jan 2021 09:00) (68 - 82)  BP: 142/76 (28 Jan 2021 09:00) (114/66 - 142/76)  BP(mean): --  RR: 15 (28 Jan 2021 09:00) (15 - 15)  SpO2: 100% (28 Jan 2021 09:00) (98% - 100%)    GENERAL- NAD  EAR/NOSE/MOUTH/THROAT - no pharyngeal exudates, no oral lesions  MMM  EYES- CHRYSTAL, conjunctiva and Sclera clear  NECK- supple  RESPIRATORY-  clear to auscultation bilaterally  CARDIOVASCULAR - SIS2, RRR  GI - soft NT BS present  EXTREMITIES- no pedal edema  NEUROLOGY- right LE weakness  SKIN- no rashes, warm to touch  PSYCHIATRY- AAO X 3  MUSCULOSKELETAL- PROM normal                12.9                 141  | 27   | 12           8.96  >-----------< 246     ------------------------< 134                   38.7                 4.4  | 106  | 0.76                                         Ca 9.1   Mg x     Ph x          CAPILLARY BLOOD GLUCOSE      POCT Blood Glucose.: 171 mg/dL (28 Jan 2021 07:57)    MEDICATIONS  (STANDING):  amLODIPine   Tablet 5 milliGRAM(s) Oral daily  aspirin  chewable 81 milliGRAM(s) Oral daily  atorvastatin 80 milliGRAM(s) Oral at bedtime  clopidogrel Tablet 75 milliGRAM(s) Oral daily  donepezil 10 milliGRAM(s) Oral at bedtime  enoxaparin Injectable 40 milliGRAM(s) SubCutaneous at bedtime  gabapentin 400 milliGRAM(s) Oral three times a day  glucagon  Injectable 1 milliGRAM(s) IntraMuscular once  insulin lispro (ADMELOG) corrective regimen sliding scale   SubCutaneous three times a day before meals  insulin lispro (ADMELOG) corrective regimen sliding scale   SubCutaneous at bedtime  metFORMIN 500 milliGRAM(s) Oral two times a day with meals  pantoprazole    Tablet 40 milliGRAM(s) Oral before breakfast    MEDICATIONS  (PRN):  acetaminophen   Tablet .. 650 milliGRAM(s) Oral every 6 hours PRN Temp greater or equal to 38C (100.4F), Mild Pain (1 - 3)  bisacodyl Suppository 10 milliGRAM(s) Rectal daily PRN Constipation    
65 year old female s/p transverse myelitis with abnormal signal at T10 on MRI s/p steroid therapy.    seen at the bedside, c/o frequent soft stools this am, likely due to laxatives, feels well, no n/v, no sob. no abdominal pain  no dysuria, no dizziness.     Vital Signs Last 24 Hrs  T(C): 36.4 (26 Jan 2021 07:50), Max: 36.8 (25 Jan 2021 21:10)  T(F): 97.6 (26 Jan 2021 07:50), Max: 98.2 (25 Jan 2021 21:10)  HR: 77 (26 Jan 2021 07:50) (68 - 83)  BP: 127/65 (26 Jan 2021 07:50) (127/65 - 144/70)  BP(mean): --  RR: 16 (26 Jan 2021 07:50) (16 - 17)  SpO2: 99% (26 Jan 2021 07:50) (99% - 99%)    GENERAL- NAD  EAR/NOSE/MOUTH/THROAT - no pharyngeal exudates, no oral lesions  MMM  EYES- CHRYSTAL, conjunctiva and Sclera clear  NECK- supple  RESPIRATORY-  clear to auscultation bilaterally  CARDIOVASCULAR - SIS2, RRR  GI - soft NT BS present  EXTREMITIES- no pedal edema  NEUROLOGY- right LE weakness  SKIN- no rashes, warm to touch  PSYCHIATRY- AAO X 3  MUSCULOSKELETAL- PROM normal                12.8                 138  | 26   | 21           9.24  >-----------< 260     ------------------------< 98                    38.5                 4.2  | 104  | 0.78                                         Ca 9.1   Mg x     Ph x        CAPILLARY BLOOD GLUCOSE      POCT Blood Glucose.: 149 mg/dL (26 Jan 2021 11:40)  POCT Blood Glucose.: 155 mg/dL (26 Jan 2021 07:43)  POCT Blood Glucose.: 120 mg/dL (26 Jan 2021 05:51)  POCT Blood Glucose.: 106 mg/dL (25 Jan 2021 21:06)  POCT Blood Glucose.: 117 mg/dL (25 Jan 2021 17:23)  POCT Blood Glucose.: 152 mg/dL (25 Jan 2021 11:57)    MEDICATIONS  (STANDING):  amLODIPine   Tablet 5 milliGRAM(s) Oral daily  aspirin  chewable 81 milliGRAM(s) Oral daily  atorvastatin 80 milliGRAM(s) Oral at bedtime  clopidogrel Tablet 75 milliGRAM(s) Oral daily  donepezil 10 milliGRAM(s) Oral at bedtime  enoxaparin Injectable 40 milliGRAM(s) SubCutaneous at bedtime  gabapentin 400 milliGRAM(s) Oral three times a day  glucagon  Injectable 1 milliGRAM(s) IntraMuscular once  insulin lispro (ADMELOG) corrective regimen sliding scale   SubCutaneous three times a day before meals  insulin lispro (ADMELOG) corrective regimen sliding scale   SubCutaneous at bedtime  metFORMIN 500 milliGRAM(s) Oral two times a day with meals  pantoprazole    Tablet 40 milliGRAM(s) Oral before breakfast    MEDICATIONS  (PRN):  acetaminophen   Tablet .. 650 milliGRAM(s) Oral every 6 hours PRN Temp greater or equal to 38C (100.4F), Mild Pain (1 - 3)  bisacodyl Suppository 10 milliGRAM(s) Rectal daily PRN Constipation  
65 year old female s/p transverse myelitis with abnormal signal at T10 on MRI s/p steroid therapy.    seen at the bedside, c/o loose tool this am, likely due to laxatives, feels well, no n/v, no sob. no abdominal pain  no dysuria, no dizziness.     Vital Signs Last 24 Hrs  T(C): 36.6 (25 Jan 2021 08:47), Max: 36.7 (24 Jan 2021 20:10)  T(F): 97.9 (25 Jan 2021 08:47), Max: 98.1 (24 Jan 2021 20:10)  HR: 77 (25 Jan 2021 08:47) (74 - 80)  BP: 121/60 (25 Jan 2021 08:47) (121/60 - 126/73)  BP(mean): --  RR: 16 (25 Jan 2021 08:47) (16 - 16)  SpO2: 97% (25 Jan 2021 08:47) (96% - 99%)    GENERAL- NAD  EAR/NOSE/MOUTH/THROAT - no pharyngeal exudates, no oral lesions  MMM  EYES- CHRYSTAL, conjunctiva and Sclera clear  NECK- supple  RESPIRATORY-  clear to auscultation bilaterally  CARDIOVASCULAR - SIS2, RRR  GI - soft NT BS present  EXTREMITIES- no pedal edema  NEUROLOGY- right LE weakness  SKIN- no rashes, warm to touch  PSYCHIATRY- AAO X 3  MUSCULOSKELETAL- PROM normal                  12.8                 138  | 26   | 21           9.24  >-----------< 260     ------------------------< 98                    38.5                 4.2  | 104  | 0.78                                         Ca 9.1   Mg x     Ph x        CAPILLARY BLOOD GLUCOSE      POCT Blood Glucose.: 97 mg/dL (25 Jan 2021 07:35)    MEDICATIONS  (STANDING):  amLODIPine   Tablet 5 milliGRAM(s) Oral daily  aspirin  chewable 81 milliGRAM(s) Oral daily  atorvastatin 80 milliGRAM(s) Oral at bedtime  clopidogrel Tablet 75 milliGRAM(s) Oral daily  dextrose 40% Gel 15 Gram(s) Oral once  dextrose 5%. 1000 milliLiter(s) (50 mL/Hr) IV Continuous <Continuous>  dextrose 5%. 1000 milliLiter(s) (100 mL/Hr) IV Continuous <Continuous>  dextrose 50% Injectable 25 Gram(s) IV Push once  dextrose 50% Injectable 12.5 Gram(s) IV Push once  dextrose 50% Injectable 25 Gram(s) IV Push once  donepezil 10 milliGRAM(s) Oral at bedtime  enoxaparin Injectable 40 milliGRAM(s) SubCutaneous at bedtime  gabapentin 300 milliGRAM(s) Oral three times a day  glucagon  Injectable 1 milliGRAM(s) IntraMuscular once  insulin lispro (ADMELOG) corrective regimen sliding scale   SubCutaneous three times a day before meals  insulin lispro (ADMELOG) corrective regimen sliding scale   SubCutaneous at bedtime  metFORMIN 850 milliGRAM(s) Oral two times a day with meals  pantoprazole    Tablet 40 milliGRAM(s) Oral before breakfast    MEDICATIONS  (PRN):  acetaminophen   Tablet .. 650 milliGRAM(s) Oral every 6 hours PRN Temp greater or equal to 38C (100.4F), Mild Pain (1 - 3)  bisacodyl Suppository 10 milliGRAM(s) Rectal daily PRN Constipation    
65 year old female s/p transverse myelitis with abnormal signal at T10 on MRI s/p steroid therapy.    seen at the bedside, no new complaints, states sh eis doing well with PT, no n/v, no sob.  no dysuria, no dizziness.     Vital Signs Last 24 Hrs  T(C): 36.3 (21 Jan 2021 09:07), Max: 36.4 (20 Jan 2021 22:07)  T(F): 97.4 (21 Jan 2021 09:07), Max: 97.5 (20 Jan 2021 22:07)  HR: 67 (21 Jan 2021 09:07) (61 - 67)  BP: 132/74 (21 Jan 2021 09:07) (111/68 - 132/74)  BP(mean): --  RR: 18 (21 Jan 2021 09:07) (18 - 18)  SpO2: 97% (21 Jan 2021 09:07) (97% - 97%)    GENERAL- NAD  EAR/NOSE/MOUTH/THROAT - no pharyngeal exudates, no oral lesions  MMM  EYES- CHRYSTAL, conjunctiva and Sclera clear  NECK- supple  RESPIRATORY-  clear to auscultation bilaterally  CARDIOVASCULAR - SIS2, RRR  GI - soft NT BS present  EXTREMITIES- no pedal edema  NEUROLOGY- right LE weakness  SKIN- no rashes, warm to touch  PSYCHIATRY- AAO X 3  MUSCULOSKELETAL- PROM normal                13.6                 139  | 31   | 26           10.65 >-----------< 257     ------------------------< 160                   41.8                 4.9  | 104  | 0.73                                         Ca 9.1   Mg x     Ph x        CAPILLARY BLOOD GLUCOSE      POCT Blood Glucose.: 255 mg/dL (21 Jan 2021 11:23)  POCT Blood Glucose.: 138 mg/dL (21 Jan 2021 07:38)  POCT Blood Glucose.: 266 mg/dL (20 Jan 2021 22:06)  POCT Blood Glucose.: 263 mg/dL (20 Jan 2021 16:49)  POCT Blood Glucose.: 330 mg/dL (20 Jan 2021 11:48)      MEDICATIONS  (STANDING):  amLODIPine   Tablet 5 milliGRAM(s) Oral daily  aspirin  chewable 81 milliGRAM(s) Oral daily  atorvastatin 80 milliGRAM(s) Oral at bedtime  clopidogrel Tablet 75 milliGRAM(s) Oral daily  donepezil 10 milliGRAM(s) Oral at bedtime  enoxaparin Injectable 40 milliGRAM(s) SubCutaneous at bedtime  gabapentin 200 milliGRAM(s) Oral three times a day  glucagon  Injectable 1 milliGRAM(s) IntraMuscular once  insulin glargine Injectable (LANTUS) 10 Unit(s) SubCutaneous at bedtime  insulin lispro (ADMELOG) corrective regimen sliding scale   SubCutaneous three times a day before meals  insulin lispro (ADMELOG) corrective regimen sliding scale   SubCutaneous at bedtime  metFORMIN 500 milliGRAM(s) Oral two times a day with meals  pantoprazole    Tablet 40 milliGRAM(s) Oral before breakfast  polyethylene glycol 3350 17 Gram(s) Oral two times a day  senna 2 Tablet(s) Oral at bedtime    MEDICATIONS  (PRN):  acetaminophen   Tablet .. 650 milliGRAM(s) Oral every 6 hours PRN Temp greater or equal to 38C (100.4F), Mild Pain (1 - 3)  bisacodyl Suppository 10 milliGRAM(s) Rectal daily PRN Constipation  
Hospitalist: Zofia Mead DO    CHIEF COMPLAINT: Patient is a 65y old  female who presents with a chief complaint of transverse myelitis (29 Jan 2021 11:34)    SUBJECTIVE / OVERNIGHT EVENTS: Patient seen and examined. No acute events overnight. Pain well controlled and patient without any complaints.    MEDICATIONS  (STANDING):  amLODIPine   Tablet 5 milliGRAM(s) Oral daily  aspirin  chewable 81 milliGRAM(s) Oral daily  atorvastatin 80 milliGRAM(s) Oral at bedtime  clopidogrel Tablet 75 milliGRAM(s) Oral daily  donepezil 10 milliGRAM(s) Oral at bedtime  enoxaparin Injectable 40 milliGRAM(s) SubCutaneous at bedtime  gabapentin 400 milliGRAM(s) Oral three times a day  glucagon  Injectable 1 milliGRAM(s) IntraMuscular once  insulin lispro (ADMELOG) corrective regimen sliding scale   SubCutaneous three times a day before meals  metFORMIN 500 milliGRAM(s) Oral two times a day with meals  pantoprazole    Tablet 40 milliGRAM(s) Oral before breakfast  polyethylene glycol 3350 17 Gram(s) Oral <User Schedule>    MEDICATIONS  (PRN):  acetaminophen   Tablet .. 650 milliGRAM(s) Oral every 6 hours PRN Temp greater or equal to 38C (100.4F), Mild Pain (1 - 3)  bisacodyl Suppository 10 milliGRAM(s) Rectal daily PRN Constipation      VITALS:  T(F): 98.3 (01-30-21 @ 08:06), Max: 98.3 (01-30-21 @ 08:06)  HR: 75 (01-30-21 @ 08:06) (75 - 87)  BP: 130/59 (01-30-21 @ 08:06) (117/67 - 138/71)  RR: 14 (01-30-21 @ 08:06) (14 - 16)  SpO2: 98% (01-30-21 @ 08:06)      REVIEW OF SYSTEMS:  For ROV please refer back to H&P     PHYSICAL EXAM:  GENERAL- NAD  EAR/NOSE/MOUTH/THROAT - no pharyngeal exudates, no oral lesions  MMM  EYES- CHRYSTAL, conjunctiva and Sclera clear  NECK- supple  RESPIRATORY-  clear to auscultation bilaterally  CARDIOVASCULAR - SIS2, RRR  GI - soft NT BS present  EXTREMITIES- no pedal edema  NEUROLOGY- right LE weakness  SKIN- no rashes, warm to touch  PSYCHIATRY- AAO X 3  MUSCULOSKELETAL- PROM normal     CAPILLARY BLOOD GLUCOSE      POCT Blood Glucose.: 134 mg/dL (30 Jan 2021 12:55)  POCT Blood Glucose.: 174 mg/dL (30 Jan 2021 07:58)        MICROBIOLOGY:          RADIOLOGY & ADDITIONAL TESTS:    Imaging Personally Reviewed:    [X] Consultant(s) Notes Reviewed:  [X] Care Discussed with Consultants/Other Providers:  
Patient is a 65y old  Female who presents with a chief complaint of transverse myelitis (20 Jan 2021 15:00)      HPI:  65 year old RH Azeri speaking female with a past medical history of HTN, DM, HLD, peripheral artery stents, and memory loss who presented to [facility] on 1/11 with inability to ambulate and lower extremity weakness. At baseline, the patient states that she is able to ambulate without any assistive devices but does so slowly. Approximately 1 week PTA she began to feel a pain in her lower abdomen that wrapped around her stomach and radiated to her back bilaterally at approximately the belly button level. She stated that the pain was uncomfortable but was not associated with any weakness and she was still able to perform her daily activities. Did not have any nausea/vomiting at that time. Pain was present for several days and then 3 days PTA she noted that she began to have lower extremity weakness which was worse in her RLE. Over that time, she noted that it was difficult for her to get up and ambulate and she required assistance from her family in order to walk. She then presented to the ED (1/11) for further evaluation. Patient notes that she feels that she has reduced sensation in her lower extremities. Denies head trauma. Denies any symptoms in her upper extremities, or face. Denies headaches, dizziness, lightheadedness, dysphagia, dysarthria, difficulty expressing herself. Denies any recent illnesses, sick contacts, animal exposure or bites. States that she received a flu vaccine in November and has not had any vaccinations since then. No exposure to COVID-19.     Patient received CT head (microvascular disease volume loss) and MR lumbosacral spine concerning for abnormal signal intensity at T9-T11, prompting further spinal imaging and lumbar puncture to rule out other etiologies for abnormal signal intensity. Patient arrived to the floor in stable condition. MR cervical and thoracic spine revealed  focus of abnormal signal at T10 that does not enhance. CSF studies showed 6 cells, protein of 78, negative PCR, and negative flow cytometry. NMO and MOG serum studies were sent. Patient was treated with a three day course of methylprednisolone (1/15-1/17).   Patient was deemed medically stable and discharged to [facility] on 1/19.    (19 Jan 2021 15:03)      TODAY'S SUBJECTIVE & REVIEW OF SYMPTOMS:      Patient seen and evaluated this morning. No acute events overnight. Participating well in therapy. Pain is well controlled. . Slept well last night. Passing lot of gas in therapy. Last bowel movement this morning. NO GI symptoms.     [X] Constiutional WNL        [X] Cardio WNL              [X] Resp WNL  [X] GI WNL                            [X]  WNL                    [X] Heme WNL  [X] Endo WNL                       [X] Skin WNL                  [X] MSK WNL  [X] Neuro WNL                     [X] Cognitive WNL         [X] Psych WNL      PHYSICAL EXAM     Vital Signs Last 24 Hrs   Vital Signs Last 24 Hrs  T(C): 36.9 (28 Jan 2021 09:00), Max: 36.9 (28 Jan 2021 09:00)  T(F): 98.4 (28 Jan 2021 09:00), Max: 98.4 (28 Jan 2021 09:00)  HR: 70 (28 Jan 2021 09:00) (68 - 82)  BP: 142/76 (28 Jan 2021 09:00) (114/66 - 142/76)  BP(mean): --  RR: 15 (28 Jan 2021 09:00) (15 - 15)  SpO2: 100% (28 Jan 2021 09:00) (98% - 100%)  General: NAD, Resting Comfortable,                                  HEENT: NC/AT, EOM I, PERRLA, Normal Conjunctivae  Cardio: RRR, Normal S1-S2, No M/G/R                              Pulm: No Respiratory Distress,  Lungs CTAB                        Abdomen: NT, Soft, BS+, abdomen distended                                                MSK: No joint swelling, Full ROM                                         Ext: No C/C/E, Pulses 2+ throughout, No calf tenderness    Skin:  all skin intact, ecchymosis on the left thigh (site of lovenox injection as per patient)                                                                Neurological Examination    Cognitive: AAO x 3                                                                         Mood/Affect: wnl                                                                           Communication:  Fluent                                                                              Sensory: diminished to light touch on the abdomen below the T7 - L1 level bilaterally and right L2-S1, intact on left L2-s1 on the left                                                                               Tone: normal Throughout     Motor    LEFT    UE: SF [5/5], EF [5/5], EE [5/5], WE [5/5],  [wnl]  RIGHT UE: SF [5/5], EF [5/5], EE [5/5], WE [5/5],  [wnl]  LEFT    LE:  HF [5/5], KE [5/5], DF [5/5], EHL [5/5],  PF [5/5]  RIGHT LE:  HF [4/5], KE [5/5], DF [4/5], EHL [3/5],  PF [4/5]      Reflex:  2 + thoroughout, Garcia/Babinski negative    RECENT LABS/IMAGING                                             12.9   8.96  )-----------( 246      ( 28 Jan 2021 05:33 )             38.7     01-28    141  |  106  |  12  ----------------------------<  134<H>  4.4   |  27  |  0.76    Ca    9.1      28 Jan 2021 05:33    TPro  6.5  /  Alb  3.1<L>  /  TBili  0.3  /  DBili  x   /  AST  25  /  ALT  51<H>  /  AlkPhos  106  01-28                  
Patient is a 65y old  Female who presents with a chief complaint of transverse myelitis (20 Jan 2021 15:00)      HPI:  65 year old RH Burkinan speaking female with a past medical history of HTN, DM, HLD, peripheral artery stents, and memory loss who presented to [facility] on 1/11 with inability to ambulate and lower extremity weakness. At baseline, the patient states that she is able to ambulate without any assistive devices but does so slowly. Approximately 1 week PTA she began to feel a pain in her lower abdomen that wrapped around her stomach and radiated to her back bilaterally at approximately the belly button level. She stated that the pain was uncomfortable but was not associated with any weakness and she was still able to perform her daily activities. Did not have any nausea/vomiting at that time. Pain was present for several days and then 3 days PTA she noted that she began to have lower extremity weakness which was worse in her RLE. Over that time, she noted that it was difficult for her to get up and ambulate and she required assistance from her family in order to walk. She then presented to the ED (1/11) for further evaluation. Patient notes that she feels that she has reduced sensation in her lower extremities. Denies head trauma. Denies any symptoms in her upper extremities, or face. Denies headaches, dizziness, lightheadedness, dysphagia, dysarthria, difficulty expressing herself. Denies any recent illnesses, sick contacts, animal exposure or bites. States that she received a flu vaccine in November and has not had any vaccinations since then. No exposure to COVID-19.     Patient received CT head (microvascular disease volume loss) and MR lumbosacral spine concerning for abnormal signal intensity at T9-T11, prompting further spinal imaging and lumbar puncture to rule out other etiologies for abnormal signal intensity. Patient arrived to the floor in stable condition. MR cervical and thoracic spine revealed  focus of abnormal signal at T10 that does not enhance. CSF studies showed 6 cells, protein of 78, negative PCR, and negative flow cytometry. NMO and MOG serum studies were sent. Patient was treated with a three day course of methylprednisolone (1/15-1/17).   Patient was deemed medically stable and discharged to [facility] on 1/19.    (19 Jan 2021 15:03)      TODAY'S SUBJECTIVE & REVIEW OF SYMPTOMS:      Patient seen and evaluated this morning. No acute events overnight. Participating well in therapy. Pain is well controlled. Questions answered about discharged.     [X] Constiutional WNL        [X] Cardio WNL              [X] Resp WNL  [X] GI WNL                            [X]  WNL                    [X] Heme WNL  [X] Endo WNL                       [X] Skin WNL                  [X] MSK WNL  [X] Neuro WNL                     [X] Cognitive WNL         [X] Psych WNL      PHYSICAL EXAM    Vital Signs Last 24 Hrs  T(C): 36.6 (01 Feb 2021 21:53), Max: 36.6 (01 Feb 2021 21:53)  T(F): 97.8 (01 Feb 2021 21:53), Max: 97.8 (01 Feb 2021 21:53)  HR: 67 (02 Feb 2021 05:24) (67 - 81)  BP: 115/68 (02 Feb 2021 05:24) (115/68 - 134/72)  BP(mean): --  RR: 15 (02 Feb 2021 05:24) (14 - 15)  SpO2: 96% (02 Feb 2021 05:24) (96% - 99%)    General: NAD, Resting Comfortable,                                  HEENT: NC/AT, EOM I, PERRLA, Normal Conjunctivae  Cardio: RRR, Normal S1-S2, No M/G/R                              Pulm: No Respiratory Distress,  Lungs CTAB                        Abdomen: NT, Soft, BS+, abdomen distended                                                MSK: No joint swelling, Full ROM                                         Ext: No C/C/E, Pulses 2+ throughout, No calf tenderness    Skin:  all skin intact, ecchymosis on the left thigh (site of lovenox injection as per patient)                                                                Neurological Examination    Cognitive: AAO x 3                                                                         Mood/Affect: wnl                                                                           Communication:  Fluent                                                                              Sensory: diminished to light touch on the abdomen below the T7 - L1 level bilaterally and right L2-S1, intact on left L2-s1 on the left                                                                               Tone: normal Throughout     Motor    LEFT    UE: SF [5/5], EF [5/5], EE [5/5], WE [5/5],  [wnl]  RIGHT UE: SF [5/5], EF [5/5], EE [5/5], WE [5/5],  [wnl]  LEFT    LE:  HF [5/5], KE [5/5], DF [5/5], EHL [5/5],  PF [5/5]  RIGHT LE:  HF [4/5], KE [5/5], DF [4/5], EHL [3/5],  PF [4/5]      Reflex:  2 + thoroughout, Garcia/Babinski negative    RECENT LABS/IMAGING                                                     12.6   9.16  )-----------( 226      ( 01 Feb 2021 05:45 )             39.2     02-01    140  |  105  |  18  ----------------------------<  130<H>  4.1   |  27  |  0.84    Ca    9.5      01 Feb 2021 05:45    TPro  6.6  /  Alb  3.2<L>  /  TBili  0.4  /  DBili  x   /  AST  27  /  ALT  69<H>  /  AlkPhos  114  02-01                              
Patient is a 65y old  Female who presents with a chief complaint of transverse myelitis (20 Jan 2021 15:00)      HPI:  65 year old RH Burkinan speaking female with a past medical history of HTN, DM, HLD, peripheral artery stents, and memory loss who presented to [facility] on 1/11 with inability to ambulate and lower extremity weakness. At baseline, the patient states that she is able to ambulate without any assistive devices but does so slowly. Approximately 1 week PTA she began to feel a pain in her lower abdomen that wrapped around her stomach and radiated to her back bilaterally at approximately the belly button level. She stated that the pain was uncomfortable but was not associated with any weakness and she was still able to perform her daily activities. Did not have any nausea/vomiting at that time. Pain was present for several days and then 3 days PTA she noted that she began to have lower extremity weakness which was worse in her RLE. Over that time, she noted that it was difficult for her to get up and ambulate and she required assistance from her family in order to walk. She then presented to the ED (1/11) for further evaluation. Patient notes that she feels that she has reduced sensation in her lower extremities. Denies head trauma. Denies any symptoms in her upper extremities, or face. Denies headaches, dizziness, lightheadedness, dysphagia, dysarthria, difficulty expressing herself. Denies any recent illnesses, sick contacts, animal exposure or bites. States that she received a flu vaccine in November and has not had any vaccinations since then. No exposure to COVID-19.     Patient received CT head (microvascular disease volume loss) and MR lumbosacral spine concerning for abnormal signal intensity at T9-T11, prompting further spinal imaging and lumbar puncture to rule out other etiologies for abnormal signal intensity. Patient arrived to the floor in stable condition. MR cervical and thoracic spine revealed  focus of abnormal signal at T10 that does not enhance. CSF studies showed 6 cells, protein of 78, negative PCR, and negative flow cytometry. NMO and MOG serum studies were sent. Patient was treated with a three day course of methylprednisolone (1/15-1/17).   Patient was deemed medically stable and discharged to [facility] on 1/19.    (19 Jan 2021 15:03)      TODAY'S SUBJECTIVE & REVIEW OF SYMPTOMS:      Patient seen and evaluated this morning. No acute events overnight. Participating well in therapy. Needle like pain in lower abdomen, band like distribution in lower trunk region. Multiple bowel movements in last 2 days, bowel program on hold. She had been constipated for 11 days before this. No nausea, vomiting, abdominal pain. Tolerating diet well, good oral intake. Episode of brief dizziness while on toilet.     [X] Constiutional WNL        [X] Cardio WNL              [X] Resp WNL  [X] GI WNL                            [X]  WNL                    [X] Heme WNL  [X] Endo WNL                       [X] Skin WNL                  [X] MSK WNL  [X] Neuro WNL                     [X] Cognitive WNL         [X] Psych WNL      PHYSICAL EXAM     Vital Signs Last 24 Hrs  T(C): 36.4 (26 Jan 2021 07:50), Max: 36.8 (25 Jan 2021 21:10)  T(F): 97.6 (26 Jan 2021 07:50), Max: 98.2 (25 Jan 2021 21:10)  HR: 77 (26 Jan 2021 07:50) (68 - 83)  BP: 127/65 (26 Jan 2021 07:50) (127/65 - 144/70)  BP(mean): --  RR: 16 (26 Jan 2021 07:50) (16 - 17)  SpO2: 99% (26 Jan 2021 07:50) (99% - 99%)     General: NAD, Resting Comfortable,                                  HEENT: NC/AT, EOM I, PERRLA, Normal Conjunctivae  Cardio: RRR, Normal S1-S2, No M/G/R                              Pulm: No Respiratory Distress,  Lungs CTAB                        Abdomen: NT, Soft, BS+, abdomen distended                                                MSK: No joint swelling, Full ROM                                         Ext: No C/C/E, Pulses 2+ throughout, No calf tenderness    Skin:  all skin intact, ecchymosis on the left thigh (site of lovenox injection as per patient)                                                                Neurological Examination    Cognitive: AAO x 3                                                                         Mood/Affect: wnl                                                                           Communication:  Fluent                                                                              Sensory: diminished to light touch on the abdomen below the T7 - L1 level bilaterally and right L2-S1, intact on left L2-s1 on the left                                                                               Tone: normal Throughout     Motor    LEFT    UE: SF [5/5], EF [5/5], EE [5/5], WE [5/5],  [wnl]  RIGHT UE: SF [5/5], EF [5/5], EE [5/5], WE [5/5],  [wnl]  LEFT    LE:  HF [5/5], KE [5/5], DF [5/5], EHL [5/5],  PF [5/5]  RIGHT LE:  HF [4/5], KE [5/5], DF [4/5], EHL [3/5],  PF [4/5]      Reflex:  2 + thoroughout, Garcia/Babinski negative    RECENT LABS/IMAGING                                       12.8   9.24  )-----------( 260      ( 25 Jan 2021 06:52 )             38.5     01-25    138  |  104  |  21  ----------------------------<  98  4.2   |  26  |  0.78    Ca    9.1      25 Jan 2021 06:52    TPro  6.2  /  Alb  2.8<L>  /  TBili  0.4  /  DBili  x   /  AST  20  /  ALT  44  /  AlkPhos  95  01-25              
Patient is a 65y old  Female who presents with a chief complaint of transverse myelitis (20 Jan 2021 15:00)      HPI:  65 year old RH Honduran speaking female with a past medical history of HTN, DM, HLD, peripheral artery stents, and memory loss who presented to [facility] on 1/11 with inability to ambulate and lower extremity weakness. At baseline, the patient states that she is able to ambulate without any assistive devices but does so slowly. Approximately 1 week PTA she began to feel a pain in her lower abdomen that wrapped around her stomach and radiated to her back bilaterally at approximately the belly button level. She stated that the pain was uncomfortable but was not associated with any weakness and she was still able to perform her daily activities. Did not have any nausea/vomiting at that time. Pain was present for several days and then 3 days PTA she noted that she began to have lower extremity weakness which was worse in her RLE. Over that time, she noted that it was difficult for her to get up and ambulate and she required assistance from her family in order to walk. She then presented to the ED (1/11) for further evaluation. Patient notes that she feels that she has reduced sensation in her lower extremities. Denies head trauma. Denies any symptoms in her upper extremities, or face. Denies headaches, dizziness, lightheadedness, dysphagia, dysarthria, difficulty expressing herself. Denies any recent illnesses, sick contacts, animal exposure or bites. States that she received a flu vaccine in November and has not had any vaccinations since then. No exposure to COVID-19.     Patient received CT head (microvascular disease volume loss) and MR lumbosacral spine concerning for abnormal signal intensity at T9-T11, prompting further spinal imaging and lumbar puncture to rule out other etiologies for abnormal signal intensity. Patient arrived to the floor in stable condition. MR cervical and thoracic spine revealed  focus of abnormal signal at T10 that does not enhance. CSF studies showed 6 cells, protein of 78, negative PCR, and negative flow cytometry. NMO and MOG serum studies were sent. Patient was treated with a three day course of methylprednisolone (1/15-1/17).   Patient was deemed medically stable and discharged to [facility] on 1/19.    (19 Jan 2021 15:03)      TODAY'S SUBJECTIVE & REVIEW OF SYMPTOMS:      Patient seen and evaluated this morning. No acute events overnight. Participating well in therapy. Pain is well controlled.  lept well last night. neuropathic pain better with increase in gabapentin.   low PVR , on scheduled toileting. last bowel movement today.     [X] Constiutional WNL        [X] Cardio WNL              [X] Resp WNL  [X] GI WNL                            [X]  WNL                    [X] Heme WNL  [X] Endo WNL                       [X] Skin WNL                  [X] MSK WNL  [X] Neuro WNL                     [X] Cognitive WNL         [X] Psych WNL      PHYSICAL EXAM    Vital Signs Last 24 Hrs   Vital Signs Last 24 Hrs  T(C): 36.6 (22 Jan 2021 08:46), Max: 36.6 (21 Jan 2021 21:48)  T(F): 97.8 (22 Jan 2021 08:46), Max: 97.8 (21 Jan 2021 21:48)  HR: 61 (22 Jan 2021 08:46) (61 - 77)  BP: 137/77 (22 Jan 2021 08:46) (100/65 - 137/77)  BP(mean): --  RR: 15 (22 Jan 2021 08:46) (15 - 18)  SpO2: 100% (22 Jan 2021 08:46) (95% - 100%)     General: NAD, Resting Comfortable,                                  HEENT: NC/AT, EOM I, PERRLA, Normal Conjunctivae  Cardio: RRR, Normal S1-S2, No M/G/R                              Pulm: No Respiratory Distress,  Lungs CTAB                        Abdomen: NT, Soft, BS+, abdomen distended                                                MSK: No joint swelling, Full ROM                                         Ext: No C/C/E, Pulses 2+ throughout, No calf tenderness    Skin:  all skin intact, ecchymosis on the left thigh (site of lovenox injection as per patient)                                                                Neurological Examination    Cognitive: AAO x 3                                                                         Mood/Affect: wnl                                                                           Communication:  Fluent                                                                              Sensory: diminished to light touch on the abdomen below the T7 - L1 level bilaterally and right L2-S1, intact on left L2-s1 on the left                                                                               Tone: normal Throughout     Motor    LEFT    UE: SF [5/5], EF [5/5], EE [5/5], WE [5/5],  [wnl]  RIGHT UE: SF [5/5], EF [5/5], EE [5/5], WE [5/5],  [wnl]  LEFT    LE:  HF [5/5], KE [5/5], DF [5/5], EHL [5/5],  PF [5/5]  RIGHT LE:  HF [4/5], KE [5/5], DF [4/5], EHL [3/5],  PF [4/5]      Reflex:  2 + thoroughout, Garcia/Babinski negative    RECENT LABS/IMAGING                                     13.6   10.65 )-----------( 257      ( 21 Jan 2021 06:00 )             41.8     01-21    139  |  104  |  26<H>  ----------------------------<  160<H>  4.9   |  31  |  0.73    Ca    9.1      21 Jan 2021 06:00    TPro  6.1  /  Alb  2.9<L>  /  TBili  0.4  /  DBili  x   /  AST  15  /  ALT  45  /  AlkPhos  97  01-21             
Patient is a 65y old  Female who presents with a chief complaint of transverse myelitis (20 Jan 2021 15:00)      HPI:  65 year old RH Wallisian speaking female with a past medical history of HTN, DM, HLD, peripheral artery stents, and memory loss who presented to [facility] on 1/11 with inability to ambulate and lower extremity weakness. At baseline, the patient states that she is able to ambulate without any assistive devices but does so slowly. Approximately 1 week PTA she began to feel a pain in her lower abdomen that wrapped around her stomach and radiated to her back bilaterally at approximately the belly button level. She stated that the pain was uncomfortable but was not associated with any weakness and she was still able to perform her daily activities. Did not have any nausea/vomiting at that time. Pain was present for several days and then 3 days PTA she noted that she began to have lower extremity weakness which was worse in her RLE. Over that time, she noted that it was difficult for her to get up and ambulate and she required assistance from her family in order to walk. She then presented to the ED (1/11) for further evaluation. Patient notes that she feels that she has reduced sensation in her lower extremities. Denies head trauma. Denies any symptoms in her upper extremities, or face. Denies headaches, dizziness, lightheadedness, dysphagia, dysarthria, difficulty expressing herself. Denies any recent illnesses, sick contacts, animal exposure or bites. States that she received a flu vaccine in November and has not had any vaccinations since then. No exposure to COVID-19.     Patient received CT head (microvascular disease volume loss) and MR lumbosacral spine concerning for abnormal signal intensity at T9-T11, prompting further spinal imaging and lumbar puncture to rule out other etiologies for abnormal signal intensity. Patient arrived to the floor in stable condition. MR cervical and thoracic spine revealed  focus of abnormal signal at T10 that does not enhance. CSF studies showed 6 cells, protein of 78, negative PCR, and negative flow cytometry. NMO and MOG serum studies were sent. Patient was treated with a three day course of methylprednisolone (1/15-1/17).   Patient was deemed medically stable and discharged to [facility] on 1/19.    (19 Jan 2021 15:03)      TODAY'S SUBJECTIVE & REVIEW OF SYMPTOMS:      Patient seen and evaluated this morning. No acute events overnight. Participating well in therapy. Pain is well controlled. . Slept well last night. KUB with large stool burden, refused enema yesterday, had small bowel movement yesterday. Educated today about one time cleanout.     [X] Constiutional WNL        [X] Cardio WNL              [X] Resp WNL  [X] GI WNL                            [X]  WNL                    [X] Heme WNL  [X] Endo WNL                       [X] Skin WNL                  [X] MSK WNL  [X] Neuro WNL                     [X] Cognitive WNL         [X] Psych WNL      PHYSICAL EXAM     Vital Signs Last 24 Hrs   Vital Signs Last 24 Hrs  T(C): 36.1 (29 Jan 2021 11:01), Max: 36.7 (28 Jan 2021 20:42)  T(F): 97 (29 Jan 2021 11:01), Max: 98 (28 Jan 2021 20:42)  HR: 82 (29 Jan 2021 11:01) (70 - 82)  BP: 127/68 (29 Jan 2021 11:01) (127/63 - 139/71)  BP(mean): --  RR: 16 (29 Jan 2021 11:01) (16 - 16)  SpO2: 98% (29 Jan 2021 11:01) (98% - 99%)      General: NAD, Resting Comfortable,                                  HEENT: NC/AT, EOM I, PERRLA, Normal Conjunctivae  Cardio: RRR, Normal S1-S2, No M/G/R                              Pulm: No Respiratory Distress,  Lungs CTAB                        Abdomen: NT, Soft, BS+, abdomen distended                                                MSK: No joint swelling, Full ROM                                         Ext: No C/C/E, Pulses 2+ throughout, No calf tenderness    Skin:  all skin intact, ecchymosis on the left thigh (site of lovenox injection as per patient)                                                                Neurological Examination    Cognitive: AAO x 3                                                                         Mood/Affect: wnl                                                                           Communication:  Fluent                                                                              Sensory: diminished to light touch on the abdomen below the T7 - L1 level bilaterally and right L2-S1, intact on left L2-s1 on the left                                                                               Tone: normal Throughout     Motor    LEFT    UE: SF [5/5], EF [5/5], EE [5/5], WE [5/5],  [wnl]  RIGHT UE: SF [5/5], EF [5/5], EE [5/5], WE [5/5],  [wnl]  LEFT    LE:  HF [5/5], KE [5/5], DF [5/5], EHL [5/5],  PF [5/5]  RIGHT LE:  HF [4/5], KE [5/5], DF [4/5], EHL [3/5],  PF [4/5]      Reflex:  2 + thoroughout, Garcia/Babinski negative    RECENT LABS/IMAGING                                             12.9   8.96  )-----------( 246      ( 28 Jan 2021 05:33 )             38.7     01-28    141  |  106  |  12  ----------------------------<  134<H>  4.4   |  27  |  0.76    Ca    9.1      28 Jan 2021 05:33    TPro  6.5  /  Alb  3.1<L>  /  TBili  0.3  /  DBili  x   /  AST  25  /  ALT  51<H>  /  AlkPhos  106  01-28                  
HPI:  65 year old female with a past medical history of HTN, DM, HLD, peripheral artery stents, and memory loss who presented to [facility] on 1/11 with inability to ambulate and lower extremity weakness.   Patient received CT head (microvascular disease volume loss) and MR lumbosacral spine concerning for abnormal signal intensity at T9-T11, prompting further spinal imaging and lumbar puncture to rule out other etiologies for abnormal signal intensity. MR cervical and thoracic spine revealed  focus of abnormal signal at T10 that does not enhance. Had LP   She was treated with a three day course of methylprednisolone (1/15-1/17). Patient was deemed medically stable and discharged to [facility] on 1/19.          TODAY'S SUBJECTIVE & REVIEW OF SYMPTOMS  Patient seen at bedside  States that she feels ok  Patient reports some lower  abdominal pain and back pain - on going since hospitalisation   Denies HA/CP/palpitations/ nausea  BM 1/23       PHYSICAL EXAM    Vital Signs Last 24 Hrs  T(C): 36.8 (24 Jan 2021 08:28), Max: 36.8 (23 Jan 2021 19:50)  T(F): 98.3 (24 Jan 2021 08:28), Max: 98.3 (24 Jan 2021 08:28)  HR: 77 (24 Jan 2021 08:28) (73 - 86)  BP: 105/67 (24 Jan 2021 08:28) (100/62 - 105/67)  BP(mean): --  RR: 16 (24 Jan 2021 08:28) (16 - 16)  SpO2: 97% (24 Jan 2021 08:28) (95% - 97%)    Constitutional - NAD, Comfortable  Chest - breathing comfortably  Cardiovascular - S1S2  Abdomen -  Soft, Non tender   Extremities - No calf tenderness   Right LE weaker compared to left UE 5/5      MEDICATIONS  (STANDING):  amLODIPine   Tablet 5 milliGRAM(s) Oral daily  aspirin  chewable 81 milliGRAM(s) Oral daily  atorvastatin 80 milliGRAM(s) Oral at bedtime  bisacodyl Suppository 10 milliGRAM(s) Rectal daily  clopidogrel Tablet 75 milliGRAM(s) Oral daily  dextrose 40% Gel 15 Gram(s) Oral once  dextrose 5%. 1000 milliLiter(s) (50 mL/Hr) IV Continuous <Continuous>  dextrose 5%. 1000 milliLiter(s) (100 mL/Hr) IV Continuous <Continuous>  dextrose 50% Injectable 12.5 Gram(s) IV Push once  dextrose 50% Injectable 25 Gram(s) IV Push once  dextrose 50% Injectable 25 Gram(s) IV Push once  donepezil 10 milliGRAM(s) Oral at bedtime  enoxaparin Injectable 40 milliGRAM(s) SubCutaneous at bedtime  gabapentin 300 milliGRAM(s) Oral three times a day  glucagon  Injectable 1 milliGRAM(s) IntraMuscular once  insulin lispro (ADMELOG) corrective regimen sliding scale   SubCutaneous three times a day before meals  insulin lispro (ADMELOG) corrective regimen sliding scale   SubCutaneous at bedtime  metFORMIN 850 milliGRAM(s) Oral two times a day with meals  pantoprazole    Tablet 40 milliGRAM(s) Oral before breakfast  polyethylene glycol 3350 17 Gram(s) Oral two times a day  senna 2 Tablet(s) Oral at bedtime    MEDICATIONS  (PRN):  acetaminophen   Tablet .. 650 milliGRAM(s) Oral every 6 hours PRN Temp greater or equal to 38C (100.4F), Mild Pain (1 - 3)      CAPILLARY BLOOD GLUCOSE      POCT Blood Glucose.: 117 mg/dL (24 Jan 2021 11:56)  POCT Blood Glucose.: 84 mg/dL (24 Jan 2021 07:47)  POCT Blood Glucose.: 156 mg/dL (23 Jan 2021 21:33)  POCT Blood Glucose.: 210 mg/dL (23 Jan 2021 16:38)

## 2021-02-02 NOTE — PROGRESS NOTE ADULT - ASSESSMENT
65 year old RH past medical history of HTN, DM2, HLD, peripheral artery stents, memory loss who presents with inability to ambulate and lower extremity weakness due to transverse myelitis with abnormal signal at T10 on MRI s/p steroid therapy  with Gait Instability, ADL impairments and Functional impairments- pt/ot/dvt ppx, pain meds    #Mood -on Aricept    #HTN-  amlodipine     #HLD- c/w statin    #CAD with stent- ASA/ plavix    #DM2- controlled  - c/w metformin, diabetic diet, Lantus d/c  - A1C with Estimated Average Glucose Result: 8.9% (01.12.21 @ 11:46)    # leukocytosis- resolved, likely secondary to steroids    #Pain control: Tylenol PRN, gabapentin    # DVT: Lovenox    medically ok to d/c today  d/w rehab dr. frye

## 2021-02-23 ENCOUNTER — APPOINTMENT (OUTPATIENT)
Dept: NEUROLOGY | Facility: CLINIC | Age: 66
End: 2021-02-23
Payer: MEDICARE

## 2021-02-23 VITALS
DIASTOLIC BLOOD PRESSURE: 86 MMHG | SYSTOLIC BLOOD PRESSURE: 152 MMHG | BODY MASS INDEX: 27.01 KG/M2 | WEIGHT: 134 LBS | HEIGHT: 59 IN | HEART RATE: 82 BPM

## 2021-02-23 VITALS — TEMPERATURE: 97.1 F

## 2021-02-23 DIAGNOSIS — I73.9 PERIPHERAL VASCULAR DISEASE, UNSPECIFIED: ICD-10-CM

## 2021-02-23 DIAGNOSIS — I10 ESSENTIAL (PRIMARY) HYPERTENSION: ICD-10-CM

## 2021-02-23 DIAGNOSIS — E11.9 TYPE 2 DIABETES MELLITUS W/OUT COMPLICATIONS: ICD-10-CM

## 2021-02-23 DIAGNOSIS — E78.5 HYPERLIPIDEMIA, UNSPECIFIED: ICD-10-CM

## 2021-02-23 PROCEDURE — 99215 OFFICE O/P EST HI 40 MIN: CPT

## 2021-02-23 RX ORDER — ATORVASTATIN CALCIUM 80 MG/1
TABLET, FILM COATED ORAL
Refills: 0 | Status: ACTIVE | COMMUNITY

## 2021-02-23 RX ORDER — DONEPEZIL HYDROCHLORIDE 10 MG/1
10 TABLET ORAL
Refills: 0 | Status: ACTIVE | COMMUNITY

## 2021-02-23 RX ORDER — CLOPIDOGREL 75 MG/1
75 TABLET, FILM COATED ORAL
Refills: 0 | Status: ACTIVE | COMMUNITY

## 2021-02-23 RX ORDER — AMLODIPINE BESYLATE 5 MG/1
TABLET ORAL
Refills: 0 | Status: ACTIVE | COMMUNITY

## 2021-02-25 NOTE — PHYSICAL EXAM
[FreeTextEntry1] : PHYSICAL EXAM\par Constitutional: Alert, no acute distress \par Neck: Full range of motion\par Psychiatric: appropriate affect and mood\par Pulmonary: No respiratory distress, stable on room air\par \par NEUROLOGICAL EXAM\par Mental status: The patient is alert, attentive, and oriented.\par Speech/language: clear and fluent \par Cranial nerves:\par CN II: Visual fields are full to confrontation. Pupil size equal and briskly reactive to light. \par CN III, IV, VI: EOMI, no nystagmus, no ptosis\par CN V: Facial sensation is intact to pinprick in all 3 divisions bilaterally.\par CN VII: Face is symmetric with normal eye closure and smile.\par CN VII: Hearing is normal to rubbing fingers\par CN IX, X: Palate elevates symmetrically. Phonation is normal.\par CN XI: Head turning and shoulder shrug are intact\par CN XII: Tongue is midline with normal movements and no atrophy.\par Motor: There is no pronator drift of out-stretched arms. Muscle bulk and tone are normal. Strength is full bilaterally. \par 5/5 muscle power at bilat: Deltoid, Biceps, Triceps, Wrist ext, Finger abd, Hip flex, Hip ext, Knee flex, Knee ext, Ankle flex, Ankle ext\par Reflexes: Hyperreflexic diffusely (? RUE>LUE), b/l friedman and 4-5 beat clonus b/l. Plantar responses are flexor.\par Sensory: Sensory level to PP up to belly. Intact sensations to vibrations\par Coordination: Rapid alternating movements and fine finger movements are intact. There is no dysmetria on finger-to-nose. There are no abnormal or extraneous movements. \par Gait/Stance: Walks with a walker. Able to stand up without assistance. \par \par \par \par \par

## 2021-02-25 NOTE — ASSESSMENT
[FreeTextEntry1] : Assessment/Plan:\par Transverse myelitis (T10)- ? Idiopathic\par \par Plan:-\par [] Will order MRI brain and cervical spine w/w/o to evaluate reported right arm weakness with slight asymmetry in reflexes in UE. \par [] Will order following labs to rule out other potential causes for TM- HIV, Syphilis, Lyme, MOG, TB, Vitamin D.\par \par Return to clinic 3 months\par \par Available imaging studies and/or blood work up were reviewed. A detailed chart review was completed prior to visit.\par \par The above plan was discussed with NATALIE ARCHANA in great detail.  NATALIE MAGAÑA verbalized understanding and agrees with plan as detailed above. Patient was provided education and counselling on current diagnosis/symptoms, diagnostic work up, treatment options and potential side effects of any prescribed therapy/therapies. She was advised to call our clinic at [phone] for any new or worsening symptoms, or with any questions or concerns. In case of acute onset of neurological symptoms or worsening presentation, patient was advised to present to nearest emergency room for further evaluation. NATALIE MAGAÑA expressed understanding and all her questions/concerns were addressed.\par

## 2021-02-25 NOTE — REASON FOR VISIT
[Post Hospitalization] : a post hospitalization visit [FreeTextEntry1] : Patient being evaluated by me for the first time

## 2021-02-25 NOTE — DATA REVIEWED
[de-identified] : MRI thoracic spine and lumbar spine w/w/o 1/12/2021:- focus of abnormal signal abnormality within cord at T10, does not enhance. \par  [de-identified] : CT head 1/12/2021:- Volume loss and microvascular ischemic changes, age indeterminate lacunar infarcts, no acute hemorrhage.

## 2021-02-25 NOTE — HISTORY OF PRESENT ILLNESS
[FreeTextEntry1] : HPI (initial visit Feb 23, 2021)- Chacha is a 66 yo w/ hx of DM2, HTN, peripheral artery disease, was admitted to [facility] 1/11/2021-1/19/2021 for difficulty ambulating and lower extremity weakness. She reported that the day prior to admission she was sitting on her couch and started to notice a painful sensation wrapping around her abdomen. This pain intensified. She eventually went to sleep and when she woke up she noted that her legs felt week. Her  needed to walk her to the bathroom. She was able to walk with some assistance. However, later that day her weakness progressed and reports that her legs felt like "jelly". She also had an episode of urinary incontinence. Her  thought she was just very sick and felt weak. She also started to notice some weakness in her right arm/hand. She presented to ED for further evaluation. \par \par She was diagnosed with transverse myelitis and received 3 days of IV steroids and was discharged to rehab at [place]. Her work up revealed a T9-T10 hyperintense cord signal abnormality without any enhancement or associated cord edema. \par \par Labs results from hospitalization:-\par CSF results- Protein 104, OCB negative, IgG index normal, Glucose 122, Rbc 280, Cells 6, WNV IgG positive and IgM negative, no growth on culture.\par Blood tests:- HbA1c 8.9, TSH normal, Ace neg, Copper normal, B12 622.\par                         RF, RENNY, SSA/SSB, Canca/panca, NMO, COVID negative\par \par Since completion of steroids and therapy at rehab she reports she is doing "wonderful" and that the recovery has been "tremendous". She says she had trouble writing with right hand and feeding herself and this has improved. She is able to walk better with a walker and feels stronger, though she adds that the left leg papo at times. She reports some continued urinary urgency, but no incontinence. \par

## 2021-03-03 PROCEDURE — 97163 PT EVAL HIGH COMPLEX 45 MIN: CPT

## 2021-03-03 PROCEDURE — 97535 SELF CARE MNGMENT TRAINING: CPT

## 2021-03-03 PROCEDURE — U0005: CPT

## 2021-03-03 PROCEDURE — 97110 THERAPEUTIC EXERCISES: CPT

## 2021-03-03 PROCEDURE — 97116 GAIT TRAINING THERAPY: CPT

## 2021-03-03 PROCEDURE — 74018 RADEX ABDOMEN 1 VIEW: CPT

## 2021-03-03 PROCEDURE — 82962 GLUCOSE BLOOD TEST: CPT

## 2021-03-03 PROCEDURE — 97167 OT EVAL HIGH COMPLEX 60 MIN: CPT

## 2021-03-03 PROCEDURE — 80053 COMPREHEN METABOLIC PANEL: CPT

## 2021-03-03 PROCEDURE — 36415 COLL VENOUS BLD VENIPUNCTURE: CPT

## 2021-03-03 PROCEDURE — U0003: CPT

## 2021-03-03 PROCEDURE — 85027 COMPLETE CBC AUTOMATED: CPT

## 2021-03-03 PROCEDURE — 97530 THERAPEUTIC ACTIVITIES: CPT

## 2021-03-03 PROCEDURE — 85025 COMPLETE CBC W/AUTO DIFF WBC: CPT

## 2021-03-05 ENCOUNTER — APPOINTMENT (OUTPATIENT)
Dept: ENDOCRINOLOGY | Facility: CLINIC | Age: 66
End: 2021-03-05
Payer: MEDICARE

## 2021-03-05 PROCEDURE — G0108 DIAB MANAGE TRN  PER INDIV: CPT

## 2021-03-05 RX ORDER — METFORMIN ER 500 MG 500 MG/1
500 TABLET ORAL DAILY
Qty: 2 | Refills: 0 | Status: ACTIVE | COMMUNITY
Start: 2021-03-05 | End: 1900-01-01

## 2021-03-05 RX ORDER — FLASH GLUCOSE SENSOR
KIT MISCELLANEOUS
Qty: 3 | Refills: 4 | Status: ACTIVE | COMMUNITY
Start: 2021-03-05 | End: 1900-01-01

## 2021-03-24 ENCOUNTER — APPOINTMENT (OUTPATIENT)
Dept: MRI IMAGING | Facility: CLINIC | Age: 66
End: 2021-03-24

## 2021-03-29 ENCOUNTER — APPOINTMENT (OUTPATIENT)
Dept: PHYSICAL MEDICINE AND REHAB | Facility: CLINIC | Age: 66
End: 2021-03-29
Payer: MEDICARE

## 2021-03-29 VITALS
WEIGHT: 134 LBS | DIASTOLIC BLOOD PRESSURE: 75 MMHG | HEIGHT: 59 IN | SYSTOLIC BLOOD PRESSURE: 117 MMHG | BODY MASS INDEX: 27.01 KG/M2 | RESPIRATION RATE: 16 BRPM | TEMPERATURE: 97.2 F | HEART RATE: 87 BPM | OXYGEN SATURATION: 98 %

## 2021-03-29 DIAGNOSIS — G82.22 PARAPLEGIA, INCOMPLETE: ICD-10-CM

## 2021-03-29 DIAGNOSIS — M79.2 NEURALGIA AND NEURITIS, UNSPECIFIED: ICD-10-CM

## 2021-03-29 PROCEDURE — 99213 OFFICE O/P EST LOW 20 MIN: CPT

## 2021-03-31 NOTE — ASSESSMENT
[FreeTextEntry1] : 64 y/o F with bilateral lower extremities weakness and sensory deficits after T9 level transverse myelitis. \par \par Referred to outpatient PT\par Plan to increase gabapentin increase 600 mg TID\par F/u in 1-2 months.

## 2021-03-31 NOTE — HISTORY OF PRESENT ILLNESS
[FreeTextEntry1] : 65 year old RH past medical history of HTN, DM, HLD, peripheral artery stents, \par memory loss who presents with inability to ambulate and lower extremity \par weakness due to transverse myelitis with abnormal signal at T10 on MRI s/p \par steroid therapy. she was discharged home on 1/26/21. she has finished home therapy. she continues to get stronger in BLE. she uses rolling walker for walking for outdoor and cane indoor. She is having daily bowel movements without any meds. she continues to have neuropathic pain in lower abdominal level, electric shock like pain, more at night, disturbs her sleep. \par No new falls, weakness, bowel/bladder changes.

## 2021-03-31 NOTE — PHYSICAL EXAM
[FreeTextEntry1] : Constitutional - NAD, Comfortable\par HEENT - NCAT, EOMI\par Neck - Supple\par Chest - CTA bilaterally\par Cardiovascular - RRR, S1S2\par Abdomen - BS+, Soft, NTND\par Extremities - No C/C/E, No calf tenderness \par Neurologic Exam -               \par    Cognitive - Awake, Alert, Oriented to self, place, date, year, situation\par    Communication - Fluent, No dysarthria\par  \par    Cranial Nerves - CN 2-12 grossly intact\par    Motor - \par                   LEFT    UE - ShAB 5/5, EF 5/5, EE 5/5, WE 5/5,  5/5\par                   RIGHT UE - ShAB 5/5, EF 5/5, EE 5/5, WE 5/5,  5/5\par                   LEFT    LE - HF 4/5, KE 5/5, DF 4/5, PF 4/5\par                   RIGHT LE - HF 4/5, KE 5/5, DF 4/5, PF 4/5   \par    Sensory - impaired below T9 bilaterally, patchy distribution\par      Reflexes - DTR hyperactive BLE. \par    Coordination - Finger-to-nose intact bilaterally \par Psychiatric - Affect WNL\par Skin - intact\par

## 2021-04-20 ENCOUNTER — APPOINTMENT (OUTPATIENT)
Dept: NEUROLOGY | Facility: CLINIC | Age: 66
End: 2021-04-20
Payer: MEDICARE

## 2021-04-20 PROCEDURE — 36415 COLL VENOUS BLD VENIPUNCTURE: CPT

## 2021-05-07 ENCOUNTER — APPOINTMENT (OUTPATIENT)
Dept: ENDOCRINOLOGY | Facility: CLINIC | Age: 66
End: 2021-05-07

## 2021-05-19 NOTE — PROGRESS NOTE ADULT - ASSESSMENT
65 year old RH Fijian speaking female with a past medical history of HTN, DM, HLD, peripheral artery stents, memory loss who presents with inability to ambulate and lower extremity weakness, concerning for transverse myeiltis given CSF pleocytosis and elevated protein as well abnormal signal at T10 on MRI, clinically improving following completed steroid therapy.    Plan:  [x] 1 g IV methylprednisolone day 3/3 today with GI ppx. No steroid taper due to DM2  [] f/u Vit E, RENNY, ANCA, NMO Ab   [x] PT/OT rec [facility]    DM  [x] BGM and ISS    HTN  [x] continue amlodipine    PAD  [x] continue aspirin and Plavix    HLD  [x] continue home Lipitor    Dispo: D/c to [facility] once authorized by insurance and bed becomes available                        INCOMPLETE! Concern for worsening infectious process/Concern for delay in diagnosis and treatment 65 year old RH Dominican speaking female with a past medical history of HTN, DM, HLD, peripheral artery stents, memory loss who presents with inability to ambulate and lower extremity weakness, concerning for transverse myeiltis given CSF pleocytosis and elevated protein as well abnormal signal at T10 on MRI, clinically improving following completed steroid therapy.    Plan:  [x] 1 g IV methylprednisolone day 3/3 today with GI ppx. No steroid taper due to DM2  [] f/u Vit E, RENNY, ANCA, NMO Ab   [x] PT/OT rec [facility]    DM  [x] BGM and ISS    HTN  [x] continue amlodipine    PAD  [x] continue aspirin and Plavix    HLD  [x] continue home Lipitor    Dispo: D/c to [facility] once authorized by insurance and bed becomes available

## 2021-05-26 ENCOUNTER — APPOINTMENT (OUTPATIENT)
Dept: NEUROLOGY | Facility: CLINIC | Age: 66
End: 2021-05-26

## 2021-05-26 ENCOUNTER — NON-APPOINTMENT (OUTPATIENT)
Age: 66
End: 2021-05-26

## 2021-06-01 LAB — ACE BLD-CCNC: 33 U/L

## 2021-06-04 ENCOUNTER — APPOINTMENT (OUTPATIENT)
Dept: PHYSICAL MEDICINE AND REHAB | Facility: CLINIC | Age: 66
End: 2021-06-04
Payer: MEDICARE

## 2021-06-04 VITALS
OXYGEN SATURATION: 96 % | TEMPERATURE: 97.4 F | BODY MASS INDEX: 27.01 KG/M2 | DIASTOLIC BLOOD PRESSURE: 75 MMHG | HEIGHT: 59 IN | RESPIRATION RATE: 16 BRPM | HEART RATE: 70 BPM | SYSTOLIC BLOOD PRESSURE: 123 MMHG | WEIGHT: 134 LBS

## 2021-06-04 DIAGNOSIS — R20.0 ANESTHESIA OF SKIN: ICD-10-CM

## 2021-06-04 LAB — PARANEOPLASTIC AB PNL SER: NORMAL

## 2021-06-04 PROCEDURE — 99213 OFFICE O/P EST LOW 20 MIN: CPT

## 2021-06-12 ENCOUNTER — APPOINTMENT (OUTPATIENT)
Dept: MRI IMAGING | Facility: IMAGING CENTER | Age: 66
End: 2021-06-12
Payer: MEDICARE

## 2021-06-12 ENCOUNTER — RESULT REVIEW (OUTPATIENT)
Age: 66
End: 2021-06-12

## 2021-06-12 ENCOUNTER — OUTPATIENT (OUTPATIENT)
Dept: OUTPATIENT SERVICES | Facility: HOSPITAL | Age: 66
LOS: 1 days | End: 2021-06-12
Payer: MEDICARE

## 2021-06-12 DIAGNOSIS — Z95.820 PERIPHERAL VASCULAR ANGIOPLASTY STATUS WITH IMPLANTS AND GRAFTS: Chronic | ICD-10-CM

## 2021-06-12 DIAGNOSIS — G37.3 ACUTE TRANSVERSE MYELITIS IN DEMYELINATING DISEASE OF CENTRAL NERVOUS SYSTEM: ICD-10-CM

## 2021-06-12 PROCEDURE — 72156 MRI NECK SPINE W/O & W/DYE: CPT | Mod: 26,MH

## 2021-06-12 PROCEDURE — 72156 MRI NECK SPINE W/O & W/DYE: CPT

## 2021-06-12 PROCEDURE — 70553 MRI BRAIN STEM W/O & W/DYE: CPT | Mod: 26,MH

## 2021-06-12 PROCEDURE — 70553 MRI BRAIN STEM W/O & W/DYE: CPT

## 2021-06-12 PROCEDURE — A9585: CPT

## 2021-06-16 ENCOUNTER — APPOINTMENT (OUTPATIENT)
Dept: NEUROLOGY | Facility: CLINIC | Age: 66
End: 2021-06-16
Payer: MEDICARE

## 2021-06-16 VITALS
BODY MASS INDEX: 27.38 KG/M2 | HEART RATE: 73 BPM | WEIGHT: 134 LBS | DIASTOLIC BLOOD PRESSURE: 72 MMHG | SYSTOLIC BLOOD PRESSURE: 130 MMHG | HEIGHT: 58.5 IN

## 2021-06-16 PROCEDURE — 99214 OFFICE O/P EST MOD 30 MIN: CPT

## 2021-06-16 NOTE — HISTORY OF PRESENT ILLNESS
[FreeTextEntry1] : INTERIM HX 06/16/2021: "I am doing progress". She describes lower back pain, "it is uncomfortable". "I am better". Her left leg gives her trouble at times. she reports she a lot more independent now.  Needs some stool softener to help with bowel movements, she has 2-3 bowel movements a day. she says she uses pull ups, as sometimes she cannot get to the bathroom in time. No incontinence. Increased urinary frequency and urgency. Mood is always good, "I am very happy, and I dance". She does therapy at home. She describes spasms in LE. She follows with [facility], Dr. Jeffery Nazario for spasticity and was prescribed baclofen and continued gabapentin 600 mg TID. She was referred to PT. She describes some sharp pains in hands, right > left (she is right handed). EMG was ordered for possible carpal tunnel syndrome. \par \par Additional labs obtained 5/29/2021: Serum ACE, paraneoplastic panel negative. \par MRI brain w.w.o 6/12/2021:- chronic microvascular ischemic changes\par MRI cervical spine w.w.o 6/12/2021:- no demyelinating lesions. Multifocal spondylosis with mild foraminal narrowing b/l at C5/C6 and moderate b/l foraminal narrowing at C6/7.\par \par HPI (initial visit Feb 23, 2021)- Chacha is a 66 yo w/ hx of DM2, HTN, peripheral artery disease, was admitted to [facility] 1/11/2021-1/19/2021 for difficulty ambulating and lower extremity weakness. She reported that the day prior to admission she was sitting on her couch and started to notice a painful sensation wrapping around her abdomen. This pain intensified. She eventually went to sleep and when she woke up she noted that her legs felt weak. Her  needed to walk her to the bathroom. She was able to walk with some assistance. However, later that day her weakness progressed and reports that her legs felt like "jelly". She also had an episode of urinary incontinence. Her  thought she was just very sick and felt weak. She also started to notice some weakness in her right arm/hand. She presented to ED for further evaluation. \par \par She was diagnosed with transverse myelitis and received 3 days of IV steroids and was discharged to rehab at [facility]. Her work up revealed a T9-T10 hyperintense cord signal abnormality without any enhancement or associated cord edema. \par \par Labs results from hospitalization:-\par CSF results- Protein 104, OCB negative, IgG index normal, Glucose 122, Rbc 280, Cells 6, WNV IgG positive and IgM negative, no growth on culture.\par Blood tests:- HbA1c 8.9, TSH normal, Ace neg, Copper normal, B12 622.\par   RF, RENNY, SSA/SSB, Canca/panca, NMO, COVID negative\par \par Since completion of steroids and therapy at rehab she reports she is doing "wonderful" and that the recovery has been "tremendous". She says she had trouble writing with right hand and feeding herself and this has improved. She is able to walk better with a walker and feels stronger, though she adds that the left leg papo at times. She reports some continued urinary urgency, but no incontinence. \par

## 2021-06-16 NOTE — ASSESSMENT
[FreeTextEntry1] : Assessment/Plan:\par 1. Transverse myelitis (T10)- ? Idiopathic. \par Continues to improve clinically. No new neurological symptoms. Able to ambulate without a cane or walker for short distances. Residual spasticity and some neuropathic pain, continue baclofen and gabapentin. \par \par 2. Paresthesias in bilateral hands- suspect b/l carpal tunnel syndrome (R>L)\par \par Plan:-\par [] MRI thoracic spine w/w/o for follow up\par [] Will order following labs to rule out other potential causes for TM- HIV, Syphilis,, MOG, TB, VIT D\par \par Management of carpal tunnel syndrome:\par 1.	Wrist splinting at a neutral angle to help decrease repetitive flexion and rotation at wrist. Wear wrist splint at night and/or during certain activities during the day. \par 2.	Apply cold packs to reduce any wrist swelling\par 3.	Pain relief with OTC NSAIDs or oral corticosteroids\par 4.	Avoid repetitive wrist and hand motions that may exacerbate symptoms\par 5.	If above measures fail, would consider referral to Ortho hand for consideration of steroid injections and/or surgical management.\par \par Return to clinic 3 months\par \par Available imaging studies and/or blood work up were reviewed. A detailed chart review was completed prior to visit.\par \par The above plan was discussed with NATALIELAKE MAGAÑA in great detail.  NATALIE MAGAÑA verbalized understanding and agrees with plan as detailed above. Patient was provided education and counselling on current diagnosis/symptoms, diagnostic work up, treatment options and potential side effects of any prescribed therapy/therapies. She was advised to call our clinic at [phone] for any new or worsening symptoms, or with any questions or concerns. In case of acute onset of neurological symptoms or worsening presentation, patient was advised to present to nearest emergency room for further evaluation. NATALIE ARCHANA expressed understanding and all her questions/concerns were addressed.\par

## 2021-06-16 NOTE — DATA REVIEWED
[de-identified] : MRI brain w.w.o 6/12/2021:- chronic microvascular ischemic changes\par MRI cervical spine w.w.o 6/12/2021:- no demyelinating lesions. Multifocal spondylosis with mild foraminal narrowing b/l at C5/C6 and moderate b/l foraminal narrowing at C6/7.\par \par MRI thoracic spine and lumbar spine w/w/o 1/12/2021:- focus of abnormal signal abnormality within cord at T10, does not enhance. \par  [de-identified] : CT head 1/12/2021:- Volume loss and microvascular ischemic changes, age indeterminate lacunar infarcts, no acute hemorrhage.

## 2021-06-16 NOTE — PHYSICAL EXAM
[FreeTextEntry1] : PHYSICAL EXAM\par Constitutional: Alert, no acute distress \par Neck: Full range of motion\par Psychiatric: appropriate affect and mood\par Pulmonary: No respiratory distress, stable on room air\par \par NEUROLOGICAL EXAM\par Mental status: The patient is alert, attentive, and oriented.\par Speech/language: No dysarthria\par Cranial nerves:\par CN II: Visual fields are full to confrontation. Pupil size equal and briskly reactive to light. VA 20/20 OU.\par CN III, IV, VI: EOMI, no nystagmus, no ptosis\par CN V: Facial sensation is intact to pinprick in all 3 divisions bilaterally.\par CN VII: Face is symmetric with normal eye closure and smile.\par CN VII: Hearing is normal to rubbing fingers\par CN IX, X: Palate elevates symmetrically. Phonation is normal.\par CN XI: Head turning and shoulder shrug are intact\par CN XII: Tongue is midline with normal movements and no atrophy.\par Motor: There is no pronator drift of out-stretched arms. Tone slightly increased in LE. Strength is full bilaterally. 5/5 muscle power at bilat: Deltoid, Biceps, Triceps, Wrist ext, Finger abd, Hip flex, Hip ext, Knee flex, Knee ext, Ankle flex, Ankle ext\par Reflexes: 2+ Biceps, Patellar hyperreflexic (R>L), no friedman or clonus. Plantar responses are flexor on left and up going on right.\par Sensory: Reduced sensation to light touch, vibration, temperature and pinprick over right leg compared to left. Vibration 20 seconds right toe and 30 seconds left toe. \par Coordination: Rapid alternating movements and fine finger movements are intact. There is no dysmetria on finger-to-nose. There are no abnormal or extraneous movements. \par Gait/Stance: Walks with a walker. Able to stand up without assistance.  slow cautious gait, short steps and slow turn\par \par \par EDSS 6/16/2021:- 5.5\par \par \par \par

## 2021-06-25 ENCOUNTER — APPOINTMENT (OUTPATIENT)
Dept: PHYSICAL MEDICINE AND REHAB | Facility: CLINIC | Age: 66
End: 2021-06-25

## 2021-07-01 NOTE — HISTORY OF PRESENT ILLNESS
[FreeTextEntry1] : \par 65 year old RH past medical history of HTN, DM, HLD, peripheral artery stents, memory loss who presents with inability to ambulate and lower extremity weakness due to transverse myeiltis with abnormal signal at T10 on MRI s/p steroid therapy ; admitted to inpatient rehab at [facility] and was discharged home on 1/26/2021. She has been doing well since dischrage. FInished home therapy. Her strength is improving. She uses RW for ambulation. Reports right wrist tingling and pain started few weeks ago- not seen by physician. She is currently taking gabapentin 600 mg TID for back pain and is helping her symptoms. No falls, bowel bladder changes  or new onset weakness since discharge from rehab. Reports some spasms in BLE specially at night and early morning. \par  \par

## 2021-07-01 NOTE — PHYSICAL EXAM
[FreeTextEntry1] : General: NAD, Resting Comfortable,                                \par HEENT: NC/AT, EOM I, PERRLA, Normal Conjunctivae\par Cardio: RRR, Normal S1-S2, No M/G/R                            \par Pulm: No Respiratory Distress,  Lungs CTAB                      \par Abdomen: NT, Soft, BS+, abdomen distended                                              \par MSK: No joint swelling, Full ROM                                       \par Ext: No C/C/E, Pulses 2+ throughout, No calf tenderness  \par    \par Neurological Examination  \par Cognitive: AAO x 3                                                                       \par Mood/Affect: wnl                                                                         \par Communication:  Fluent                                                                            \par Sensory: diminished to light touch on the abdomen below the T7 - L1 level bilaterally and right L2-S1, intact on left L2-s1 on the left                                                                             \par Tone: increased BLE, MAS of 1+ in calves and hamstrings. \par Motor  \par LEFT    UE: SF [5/5], EF [5/5], EE [5/5], WE [5/5],  [wnl]\par RIGHT UE: SF [5/5], EF [5/5], EE [5/5], WE [5/5],  [wnl]\par LEFT    LE:  HF [5/5], KE [5/5], DF [5/5], EHL [5/5],  PF [5/5]\par RIGHT LE:  HF [4/5], KE [5/5], DF [4/5], EHL [3/5],  PF [4/5]  \par Reflex:  2 + thoroughout, Garcia/Babinski negative

## 2021-07-01 NOTE — ASSESSMENT
[FreeTextEntry1] : 65 year old RH past medical history of HTN, DM, HLD, peripheral artery stents, memory loss who presents with inability to ambulate and lower extremity weakness due to transverse myeiltis with abnormal signal at T10 on MRI s/p steroid therapy\par \par \par wrist pain and fingers tingling: referred for EMG for evaluation of CTS vs radiculopathy. \par Lower extremities spasms: start baclofen 10 mg at night\par continue gabapentin 600 mg TID.\par Continue outpatient PT\par Follow up in general rehab clinic at [place] as needed.

## 2021-08-14 ENCOUNTER — APPOINTMENT (OUTPATIENT)
Dept: MRI IMAGING | Facility: IMAGING CENTER | Age: 66
End: 2021-08-14

## 2021-09-21 ENCOUNTER — APPOINTMENT (OUTPATIENT)
Dept: NEUROLOGY | Facility: CLINIC | Age: 66
End: 2021-09-21
Payer: MEDICARE

## 2021-09-21 DIAGNOSIS — R25.2 CRAMP AND SPASM: ICD-10-CM

## 2021-09-21 PROCEDURE — 99214 OFFICE O/P EST MOD 30 MIN: CPT

## 2021-09-21 RX ORDER — BACLOFEN 10 MG/1
10 TABLET ORAL
Qty: 30 | Refills: 6 | Status: ACTIVE | COMMUNITY
Start: 2021-06-04 | End: 1900-01-01

## 2021-09-21 RX ORDER — GABAPENTIN 600 MG/1
600 TABLET, COATED ORAL 3 TIMES DAILY
Qty: 90 | Refills: 6 | Status: ACTIVE | COMMUNITY
Start: 2021-03-29 | End: 1900-01-01

## 2021-09-21 RX ORDER — LIDOCAINE 5% 700 MG/1
5 PATCH TOPICAL
Qty: 30 | Refills: 4 | Status: ACTIVE | COMMUNITY
Start: 2021-09-21

## 2021-09-21 NOTE — ASSESSMENT
[FreeTextEntry1] : Assessment/Plan:\par 1. Transverse myelitis (T10)- ? Idiopathic. \par Continues to improve clinically. No new neurological symptoms. Able to ambulate without a cane or walker for short distances. Residual spasticity and some neuropathic pain, continue gabapentin. \par Plan:-\par [] MRI thoracic spine w/w/o for follow up - pending - scheduled 10/12\par [] Will order following labs to rule out other potential causes for TM- HIV, Syphilis,, MOG, TB, VIT D (pending)\par \par \par 2. Chronic lower back pain- musculoskeletal\par Continue gabapentin. \par Will prescribe baclofen 10 mg qhs\par Resume PT\par Advised use of lidocaine patch and/or Voltaren cream \par Massage therapy and heat compressing\par F/u PM&R to consider trigger point injections\par \par \par 3. Paresthesias in bilateral hands- suspect b/l carpal tunnel syndrome (R>L)\par EMG/NCS scheduled with Dr. Steele 11/29/2021\par Management of carpal tunnel syndrome:\par 1.	Wrist splinting at a neutral angle to help decrease repetitive flexion and rotation at wrist. Wear wrist splint at night and/or during certain activities during the day. \par 2.	Apply cold packs to reduce any wrist swelling\par 3.	Pain relief with OTC NSAIDs or oral corticosteroids\par 4.	Avoid repetitive wrist and hand motions that may exacerbate symptoms\par 5.	If above measures fail, would consider referral to Ortho hand for consideration of steroid injections and/or surgical management.\par \par Return to clinic Jan 2022\par \par Available imaging studies and/or blood work up were reviewed. A detailed chart review was completed prior to visit.\par \par The above plan was discussed with NATALIE MAGAÑA in great detail.  NATALIE MAGAÑA verbalized understanding and agrees with plan as detailed above. Patient was provided education and counselling on current diagnosis/symptoms, diagnostic work up, treatment options and potential side effects of any prescribed therapy/therapies. She was advised to call our clinic at [phone] for any new or worsening symptoms, or with any questions or concerns. In case of acute onset of neurological symptoms or worsening presentation, patient was advised to present to nearest emergency room for further evaluation. NATALIE ARCHANA expressed understanding and all her questions/concerns were addressed.\par

## 2021-09-21 NOTE — DATA REVIEWED
[de-identified] : MRI brain w.w.o 6/12/2021:- chronic microvascular ischemic changes\par MRI cervical spine w.w.o 6/12/2021:- no demyelinating lesions. Multifocal spondylosis with mild foraminal narrowing b/l at C5/C6 and moderate b/l foraminal narrowing at C6/7.\par \par MRI thoracic spine and lumbar spine w/w/o 1/12/2021:- focus of abnormal signal abnormality within cord at T10, does not enhance. \par  [de-identified] : CT head 1/12/2021:- Volume loss and microvascular ischemic changes, age indeterminate lacunar infarcts, no acute hemorrhage.

## 2021-09-21 NOTE — PHYSICAL EXAM
[FreeTextEntry1] : PHYSICAL EXAM\par Constitutional: Alert, no acute distress \par Neck: Full range of motion\par Psychiatric: appropriate affect and mood\par Pulmonary: No respiratory distress, stable on room air\par \par NEUROLOGICAL EXAM\par Mental status: The patient is alert, attentive, and oriented.\par Speech/language: No dysarthria\par Cranial nerves:\par CN II: Pupil size equal and briskly reactive to light. VA 20/20 OU.\par CN III, IV, VI: EOMI, no nystagmus, no ptosis\par CN V: Facial sensation is intact to pinprick in all 3 divisions bilaterally.\par CN VII: Face is symmetric with normal eye closure and smile.\par CN VII: Hearing is normal to rubbing fingers\par CN IX, X: Palate elevates symmetrically. Phonation is normal.\par CN XI: Head turning and shoulder shrug are intact\par CN XII: Tongue is midline with normal movements and no atrophy.\par Motor: There is no pronator drift of out-stretched arms. Tone slightly increased in LE. Strength is full bilaterally. 5/5 muscle power at bilat: Deltoid, Biceps, Triceps, Wrist ext, Finger abd, Hip flex, Hip ext, Knee flex, Knee ext, Ankle flex, Ankle ext\par Reflexes: 2+ Biceps, Patellar hyperreflexic (R>L), no friedman or clonus. Plantar responses are flexor on left and up going on right.\par Sensory: Reduced sensation to light touch, vibration, temperature and pinprick over right leg compared to left. Vibration 20 seconds right toe and 30 seconds left toe. \par Coordination: Rapid alternating movements and fine finger movements are intact. There is no dysmetria on finger-to-nose. There are no abnormal or extraneous movements. \par Gait/Stance: Walks with a walker. Able to stand up without assistance. slow cautious gait, short steps and slow turn\par \par \par EDSS 6/16/2021:- 5.5\par \par

## 2021-09-21 NOTE — HISTORY OF PRESENT ILLNESS
[FreeTextEntry1] : INTERIM HX 09/21/2021: Pt returns to clinic with concerns for chronic lower back pain and buttock pain-muscular and burning pain. If she is sitting for long period of time the pain is worse. She also describes achy groin pain. She is on gabapentin 600 mg TID. Followed by [facility], Dr. Nazario. Repeat MRI T spine pending (scheduled 10/12). She did not get labs hannah that were ordered at last visit. She is not doing PT. No falls.\par \par INTERIM HX 06/16/2021: "I am doing progress". She describes lower back pain, "it is uncomfortable". "I am better". Her left leg gives her trouble at times. she reports she a lot more independent now.  Needs some stool softener to help with bowel movements, she has 2-3 bowel movements a day. she says she uses pull ups, as sometimes she cannot get to the bathroom in time. No incontinence. Increased urinary frequency and urgency. Mood is always good, "I am very happy, and I dance". She does therapy at home. She describes spasms in LE. She follows with [facility], Dr. Jeffery Nazario for spasticity and was prescribed baclofen and continued gabapentin 600 mg TID. She was referred to PT. She describes some sharp pains in hands, right > left (she is right handed). EMG was ordered for possible carpal tunnel syndrome. \par \par Additional labs obtained 5/29/2021: Serum ACE, paraneoplastic panel negative. \par MRI brain w.w.o 6/12/2021:- chronic microvascular ischemic changes\par MRI cervical spine w.w.o 6/12/2021:- no demyelinating lesions. Multifocal spondylosis with mild foraminal narrowing b/l at C5/C6 and moderate b/l foraminal narrowing at C6/7.\par \par HPI (initial visit Feb 23, 2021)- Chacha is a 64 yo w/ hx of DM2, HTN, peripheral artery disease, was admitted to [facility] 1/11/2021-1/19/2021 for difficulty ambulating and lower extremity weakness. She reported that the day prior to admission she was sitting on her couch and started to notice a painful sensation wrapping around her abdomen. This pain intensified. She eventually went to sleep and when she woke up she noted that her legs felt weak. Her  needed to walk her to the bathroom. She was able to walk with some assistance. However, later that day her weakness progressed and reports that her legs felt like "jelly". She also had an episode of urinary incontinence. Her  thought she was just very sick and felt weak. She also started to notice some weakness in her right arm/hand. She presented to ED for further evaluation. \par \par She was diagnosed with transverse myelitis and received 3 days of IV steroids and was discharged to rehab at [facility]. Her work up revealed a T9-T10 hyperintense cord signal abnormality without any enhancement or associated cord edema. \par \par Labs results from hospitalization:-\par CSF results- Protein 104, OCB negative, IgG index normal, Glucose 122, Rbc 280, Cells 6, WNV IgG positive and IgM negative, no growth on culture.\par Blood tests:- HbA1c 8.9, TSH normal, Ace neg, Copper normal, B12 622.\par   RF, RENNY, SSA/SSB, Canca/panca, NMO, COVID negative\par \par Since completion of steroids and therapy at rehab she reports she is doing "wonderful" and that the recovery has been "tremendous". She says she had trouble writing with right hand and feeding herself and this has improved. She is able to walk better with a walker and feels stronger, though she adds that the left leg papo at times. She reports some continued urinary urgency, but no incontinence. \par

## 2021-10-06 PROBLEM — I10 ESSENTIAL HYPERTENSION: Status: ACTIVE | Noted: 2021-02-23

## 2021-11-12 ENCOUNTER — OUTPATIENT (OUTPATIENT)
Dept: OUTPATIENT SERVICES | Facility: HOSPITAL | Age: 66
LOS: 1 days | End: 2021-11-12
Payer: MEDICARE

## 2021-11-12 ENCOUNTER — APPOINTMENT (OUTPATIENT)
Dept: MRI IMAGING | Facility: IMAGING CENTER | Age: 66
End: 2021-11-12
Payer: MEDICARE

## 2021-11-12 DIAGNOSIS — G37.3 ACUTE TRANSVERSE MYELITIS IN DEMYELINATING DISEASE OF CENTRAL NERVOUS SYSTEM: ICD-10-CM

## 2021-11-12 DIAGNOSIS — Z95.820 PERIPHERAL VASCULAR ANGIOPLASTY STATUS WITH IMPLANTS AND GRAFTS: Chronic | ICD-10-CM

## 2021-11-12 PROCEDURE — A9585: CPT

## 2021-11-12 PROCEDURE — 72157 MRI CHEST SPINE W/O & W/DYE: CPT | Mod: 26,ME

## 2021-11-12 PROCEDURE — G1004: CPT

## 2021-11-12 PROCEDURE — 72157 MRI CHEST SPINE W/O & W/DYE: CPT | Mod: ME

## 2021-11-29 ENCOUNTER — APPOINTMENT (OUTPATIENT)
Dept: NEUROLOGY | Facility: CLINIC | Age: 66
End: 2021-11-29

## 2021-12-08 ENCOUNTER — NON-APPOINTMENT (OUTPATIENT)
Age: 66
End: 2021-12-08

## 2021-12-21 ENCOUNTER — APPOINTMENT (OUTPATIENT)
Dept: NEUROLOGY | Facility: CLINIC | Age: 66
End: 2021-12-21
Payer: MEDICARE

## 2021-12-21 VITALS
HEART RATE: 84 BPM | WEIGHT: 135 LBS | BODY MASS INDEX: 27.58 KG/M2 | SYSTOLIC BLOOD PRESSURE: 130 MMHG | HEIGHT: 58.5 IN | DIASTOLIC BLOOD PRESSURE: 75 MMHG

## 2021-12-21 DIAGNOSIS — M47.816 SPONDYLOSIS W/OUT MYELOPATHY OR RADICULOPATHY, LUMBAR REGION: ICD-10-CM

## 2021-12-21 PROCEDURE — 99214 OFFICE O/P EST MOD 30 MIN: CPT

## 2021-12-21 NOTE — REASON FOR VISIT
[Initial Eval - Existing Diagnosis] : an initial evaluation of an existing diagnosis [FreeTextEntry1] : transverse myelitis

## 2021-12-21 NOTE — PHYSICAL EXAM
[I: Normal Smell] : smell intact bilaterally [Cranial Nerves Optic (II)] : visual acuity intact bilaterally,  visual fields full to confrontation, pupils equal round and reactive to light [Cranial Nerves Oculomotor (III)] : extraocular motion intact [Cranial Nerves Trigeminal (V)] : facial sensation intact symmetrically [Cranial Nerves Facial (VII)] : face symmetrical [Cranial Nerves Vestibulocochlear (VIII)] : hearing was intact bilaterally [Cranial Nerves Glossopharyngeal (IX)] : tongue and palate midline [Cranial Nerves Accessory (XI - Cranial And Spinal)] : head turning and shoulder shrug symmetric [Cranial Nerves Hypoglossal (XII)] : there was no tongue deviation with protrusion [Motor Tone] : muscle tone was normal in all four extremities [Motor Handedness Right-Handed] : the patient is right hand dominant [+4] : hip adduction +4/5 [5] : ankle plantar flexion 5/5 [Sensation Tactile Decrease] : light touch was intact [Sensation Pain / Temperature Decrease] : pain and temperature was intact [2+] : Ankle jerk left 2+ [General Appearance - Alert] : alert [General Appearance - In No Acute Distress] : in no acute distress [Oriented To Time, Place, And Person] : oriented to person, place, and time [Impaired Insight] : insight and judgment were intact [Affect] : the affect was normal [3+] : Patella left 3+ [Sclera] : the sclera and conjunctiva were normal [PERRL With Normal Accommodation] : pupils were equal in size, round, reactive to light, with normal accommodation [Extraocular Movements] : extraocular movements were intact [Outer Ear] : the ears and nose were normal in appearance [Oropharynx] : the oropharynx was normal [Neck Appearance] : the appearance of the neck was normal [Neck Cervical Mass (___cm)] : no neck mass was observed [Jugular Venous Distention Increased] : there was no jugular-venous distention [Thyroid Diffuse Enlargement] : the thyroid was not enlarged [Thyroid Nodule] : there were no palpable thyroid nodules [Auscultation Breath Sounds / Voice Sounds] : lungs were clear to auscultation bilaterally [Full Pulse] : the pedal pulses are present [Edema] : there was no peripheral edema [Bowel Sounds] : normal bowel sounds [Abdomen Soft] : soft [Abdomen Tenderness] : non-tender [Abdomen Mass (___ Cm)] : no abdominal mass palpated [No CVA Tenderness] : no ~M costovertebral angle tenderness [No Spinal Tenderness] : no spinal tenderness [Nail Clubbing] : no clubbing  or cyanosis of the fingernails [Musculoskeletal - Swelling] : no joint swelling seen [Skin Color & Pigmentation] : normal skin color and pigmentation [] : no rash [FreeTextEntry4] : 3/3 recall after 5 words [FreeTextEntry8] : antalgic gait, leaned forward

## 2021-12-21 NOTE — HISTORY OF PRESENT ILLNESS
[FreeTextEntry1] : INTERIm HX 12/21/21: Pt offered  in [place] but refused. She usually follows with Dr. Whitaker. Still having back pain, will go to sleep with pain. When she has a headache she takes Tylenol.  Says he does not want to take more because she doesn't want to get addicted. Takes gabapentin 600mg TID. MR thoracic spine was done and showed T2 hyperintensity in T10-T11 which was previously seen but not enhancing. Is doing stretching exercises which helps the lower back and moving her bowels. About 10-11 weeks ago had feeling of dizziness and spinning in bed. She had this before in January. \par \par \par INTERIM HX 09/21/2021: Pt returns to clinic with concerns for chronic lower back pain and buttock pain-muscular and burning pain. If she is sitting for long period of time the pain is worse. She also describes achy groin pain. She is on gabapentin 600 mg TID. Followed by PM&R, Dr. Nazario. Repeat MRI T spine pending (scheduled 10/12). She did not get labs hannah that were ordered at last visit. She is not doing PT. No falls.\par \par INTERIM HX 06/16/2021: "I am doing progress". She describes lower back pain, "it is uncomfortable". "I am better". Her left leg gives her trouble at times. she reports she a lot more independent now. Needs some stool softener to help with bowel movements, she has 2-3 bowel movements a day. she says she uses pull ups, as sometimes she cannot get to the bathroom in time. No incontinence. Increased urinary frequency and urgency. Mood is always good, "I am very happy, and I dance". She does therapy at home. She describes spasms in LE. She follows with Pm&R, Dr. Jeffery Nazario for spasticity and was prescribed baclofen and continued gabapentin 600 mg TID. She was referred to PT. She describes some sharp pains in hands, right > left (she is right handed). EMG was ordered for possible carpal tunnel syndrome. \par \par Additional labs obtained 5/29/2021: Serum ACE, paraneoplastic panel negative. \par MRI brain w.w.o 6/12/2021:- chronic microvascular ischemic changes\par MRI cervical spine w.w.o 6/12/2021:- no demyelinating lesions. Multifocal spondylosis with mild foraminal narrowing b/l at C5/C6 and moderate b/l foraminal narrowing at C6/7.\par \par HPI (initial visit Feb 23, 2021)- Chacha is a 64 yo w/ hx of DM2, HTN, peripheral artery disease, was admitted to [facility] 1/11/2021-1/19/2021 for difficulty ambulating and lower extremity weakness. She reported that the day prior to admission she was sitting on her couch and started to notice a painful sensation wrapping around her abdomen. This pain intensified. She eventually went to sleep and when she woke up she noted that her legs felt weak. Her  needed to walk her to the bathroom. She was able to walk with some assistance. However, later that day her weakness progressed and reports that her legs felt like "jelly". She also had an episode of urinary incontinence. Her  thought she was just very sick and felt weak. She also started to notice some weakness in her right arm/hand. She presented to ED for further evaluation. \par \par She was diagnosed with transverse myelitis and received 3 days of IV steroids and was discharged to rehab at [place]. Her work up revealed a T9-T10 hyperintense cord signal abnormality without any enhancement or associated cord edema. \par \par Labs results from hospitalization:-\par CSF results- Protein 104, OCB negative, IgG index normal, Glucose 122, Rbc 280, Cells 6, WNV IgG positive and IgM negative, no growth on culture.\par Blood tests:- HbA1c 8.9, TSH normal, Ace neg, Copper normal, B12 622.\par  RF, RENNY, SSA/SSB, Canca/panca, NMO, COVID negative\par \par Since completion of steroids and therapy at rehab she reports she is doing "wonderful" and that the recovery has been "tremendous". She says she had trouble writing with right hand and feeding herself and this has improved. She is able to walk better with a walker and feels stronger, though she adds that the left leg papo at times. She reports some continued urinary urgency, but no incontinence. \par \par

## 2021-12-21 NOTE — ASSESSMENT
[FreeTextEntry1] : 67 y/o woman with hx of transverse myelitis and chronic lower back pain. Repeat Thoracic MRI shows T2 abnormality at T10-T11 but no new enhancement. Pt has L5 retrolisthesis on previous imaging and some paraspinal tenderness. She was seeing Dr. Nazario but she has moved away and needs a new physican. She was having a hard time getting PT because of transportation but now is able to receive this. \par \par - Referrral for PT for LBP\par - Referrral to Dr. Silva physiatry \par - RTC 3 months with Dr. Whitaker.

## 2022-01-10 ENCOUNTER — APPOINTMENT (OUTPATIENT)
Dept: NEUROLOGY | Facility: CLINIC | Age: 67
End: 2022-01-10

## 2022-03-18 ENCOUNTER — APPOINTMENT (OUTPATIENT)
Dept: NEUROLOGY | Facility: CLINIC | Age: 67
End: 2022-03-18
Payer: MEDICARE

## 2022-03-18 VITALS
WEIGHT: 132 LBS | SYSTOLIC BLOOD PRESSURE: 142 MMHG | HEART RATE: 77 BPM | BODY MASS INDEX: 26.61 KG/M2 | DIASTOLIC BLOOD PRESSURE: 74 MMHG | HEIGHT: 59 IN

## 2022-03-18 DIAGNOSIS — R29.898 OTHER SYMPTOMS AND SIGNS INVOLVING THE MUSCULOSKELETAL SYSTEM: ICD-10-CM

## 2022-03-18 DIAGNOSIS — G37.3 ACUTE TRANSVERSE MYELITIS IN DEMYELINATING DISEASE OF CENTRAL NERVOUS SYSTEM: ICD-10-CM

## 2022-03-18 DIAGNOSIS — R56.9 UNSPECIFIED CONVULSIONS: ICD-10-CM

## 2022-03-18 DIAGNOSIS — M62.838 OTHER MUSCLE SPASM: ICD-10-CM

## 2022-03-18 DIAGNOSIS — M54.9 DORSALGIA, UNSPECIFIED: ICD-10-CM

## 2022-03-18 DIAGNOSIS — G56.03 CARPAL TUNNEL SYNDROM,BILATERAL UPPER LIMBS: ICD-10-CM

## 2022-03-18 PROCEDURE — 99215 OFFICE O/P EST HI 40 MIN: CPT

## 2022-03-18 RX ORDER — ASPIRIN 81 MG
81 TABLET, DELAYED RELEASE (ENTERIC COATED) ORAL
Refills: 0 | Status: DISCONTINUED | COMMUNITY
End: 2022-03-18

## 2022-03-18 RX ORDER — GABAPENTIN 600 MG/1
600 TABLET, COATED ORAL 3 TIMES DAILY
Qty: 90 | Refills: 2 | Status: DISCONTINUED | COMMUNITY
Start: 2021-06-04 | End: 2022-03-18

## 2022-03-18 RX ORDER — METFORMIN HYDROCHLORIDE 625 MG/1
TABLET ORAL
Refills: 0 | Status: DISCONTINUED | COMMUNITY
End: 2022-03-18

## 2022-03-18 NOTE — ASSESSMENT
[FreeTextEntry1] : Assessment/Plan:\par 1. Convulsions- new onset- concern for possible focal seizures\par [] Will order 24 hour EEG\par [] Repeat brain MRI w/w/wo\par [] Seizure precautions discussed (she does not drive)\par \par 2. Transverse myelitis (T10)- ? Idiopathic. \par Continues to improve clinically. No new neurological symptoms. Able to ambulate without a cane or walker for short distances. Residual spasticity and some neuropathic pain, continue gabapentin. MRI T spine 11/2021 was stable. \par Plan:-\par [] Will order following labs to rule out other potential causes for TM- HIV, Syphilis, MOG, TB. \par [] Vitamin D supplementation- Vitamin D3 2000 IU daily. Will check level\par \par \par 3. Chronic lower back pain and with new proximal left lower extremity and sharp radiating pains-\par MRI L spine w/w/o to rule out spine pathology\par Continue gabapentin and baclofen. \par Recommended PT (she has not started yet)\par Advised use of lidocaine patch and/or Voltaren cream \par Massage therapy and heat compressing\par F/u PM&R to consider trigger point injections\par \par 4. Paresthesias in bilateral hands- suspect b/l carpal tunnel syndrome (R>L)\par EMG/NCS ordered (patient did not complete this)\par Management of carpal tunnel syndrome:\par 1.	Wrist splinting at a neutral angle to help decrease repetitive flexion and rotation at wrist. Wear wrist splint at night and/or during certain activities during the day. \par 2.	Apply cold packs to reduce any wrist swelling\par 3.	Pain relief with OTC NSAIDs or oral corticosteroids\par 4.	Avoid repetitive wrist and hand motions that may exacerbate symptoms\par 5.	If above measures fail, would consider referral to Ortho hand for consideration of steroid injections and/or surgical management.\par \par Return to clinic 6 weeks, or sooner if needed\par \par Available imaging studies and/or blood work up were reviewed. A detailed chart review was completed prior to visit.\par \par The above plan was discussed with NATALIE MAGAÑA in great detail.  NATALIE MAGAÑA verbalized understanding and agrees with plan as detailed above. Patient was provided education and counselling on current diagnosis/symptoms, diagnostic work up, treatment options and potential side effects of any prescribed therapy/therapies. She was advised to call our clinic at [phone] for any new or worsening symptoms, or with any questions or concerns. In case of acute onset of neurological symptoms or worsening presentation, patient was advised to present to nearest emergency room for further evaluation. NATALIE MAGAÑA expressed understanding and all her questions/concerns were addressed.\par

## 2022-03-18 NOTE — HISTORY OF PRESENT ILLNESS
[FreeTextEntry1] : INTERIM HX 03/18/2022: She refused  (she is poor historian in English and hard to keep on track). MRI T spine 11/12/2021 was stable- less conspicuous appearing anterior cord (non enhancing) lesion at T10-T11. She did not have recommended blood work that was ordered at Sept appointment. EMG/NCS for suspected CTS not done. She saw Dr. George on 12/21/2021, in my absence, and was referred to PT and physiatry for lower back pain. She sometimes looses her balance when walking, legs giveaway. In October 2021, she reports experiencing "convulsions in head", she says she woke up morning and whole body was shaking, even face (left side), and she was awake during the shaking, "whole body was shaking", this lasted seconds, no tongue bite or incontinence, she went back to sleep. This was the first time this happened to her. When she woke up the next day, her left leg felt weak and left buttock felt painful.  She reports now her left leg is weak leg, before it was her right leg. She reports she has these "convulsions" many times, "any time", last one was yesterday morning, not all the same amplitude, the left side of jerking. No hx of COVID infection. "I am always confused". She says she is confused using her phone. \par \par She reports that in the end of Feb, her legs felt more weak and had more trouble walking x 2 weeks. \par \par INTERIM HX 09/21/2021: Pt returns to clinic with concerns for chronic lower back pain and buttock pain-muscular and burning pain. If she is sitting for long period of time the pain is worse. She also describes achy groin pain. She is on gabapentin 600 mg TID. Followed by PM&R, Dr. Nazario. Repeat MRI T spine pending (scheduled 10/12). She did not get labs hannah that were ordered at last visit. She is not doing PT. No falls.\par \par INTERIM HX 06/16/2021: "I am doing progress". She describes lower back pain, "it is uncomfortable". "I am better". Her left leg gives her trouble at times. she reports she a lot more independent now.  Needs some stool softener to help with bowel movements, she has 2-3 bowel movements a day. she says she uses pull ups, as sometimes she cannot get to the bathroom in time. No incontinence. Increased urinary frequency and urgency. Mood is always good, "I am very happy, and I dance". She does therapy at home. She describes spasms in LE. She follows with Pm&R, Dr. Jeffery Nazario for spasticity and was prescribed baclofen and continued gabapentin 600 mg TID. She was referred to PT. She describes some sharp pains in hands, right > left (she is right handed). EMG was ordered for possible carpal tunnel syndrome. \par \par Additional labs obtained 5/29/2021: Serum ACE, paraneoplastic panel negative. \par MRI brain w.w.o 6/12/2021:- chronic microvascular ischemic changes\par MRI cervical spine w.w.o 6/12/2021:- no demyelinating lesions. Multifocal spondylosis with mild foraminal narrowing b/l at C5/C6 and moderate b/l foraminal narrowing at C6/7.\par \par HPI (initial visit Feb 23, 2021)- Chacha is a 66 yo w/ hx of DM2, HTN, peripheral artery disease, was admitted to [facility] 1/11/2021-1/19/2021 for difficulty ambulating and lower extremity weakness. She reported that the day prior to admission she was sitting on her couch and started to notice a painful sensation wrapping around her abdomen. This pain intensified. She eventually went to sleep and when she woke up she noted that her legs felt weak. Her  needed to walk her to the bathroom. She was able to walk with some assistance. However, later that day her weakness progressed and reports that her legs felt like "jelly". She also had an episode of urinary incontinence. Her  thought she was just very sick and felt weak. She also started to notice some weakness in her right arm/hand. She presented to ED for further evaluation. \par \par She was diagnosed with transverse myelitis and received 3 days of IV steroids and was discharged to rehab at [place]. Her work up revealed a T9-T10 hyperintense cord signal abnormality without any enhancement or associated cord edema. \par \par Labs results from hospitalization:-\par CSF results- Protein 104, OCB negative, IgG index normal, Glucose 122, Rbc 280, Cells 6, WNV IgG positive and IgM negative, no growth on culture.\par Blood tests:- HbA1c 8.9, TSH normal, Ace neg, Copper normal, B12 622.\par   RF, RENNY, SSA/SSB, Canca/panca, NMO, COVID negative\par \par Since completion of steroids and therapy at rehab she reports she is doing "wonderful" and that the recovery has been "tremendous". She says she had trouble writing with right hand and feeding herself and this has improved. She is able to walk better with a walker and feels stronger, though she adds that the left leg papo at times. She reports some continued urinary urgency, but no incontinence. \par

## 2022-03-18 NOTE — DATA REVIEWED
[de-identified] : MRI T spine 11/12/2021 was stable- less conspicuous appearing anterior cord (non enhancing) lesion at T10-T11.\par \par MRI brain w.w.o 6/12/2021:- chronic microvascular ischemic changes\par MRI cervical spine w.w.o 6/12/2021:- no demyelinating lesions. Multifocal spondylosis with mild foraminal narrowing b/l at C5/C6 and moderate b/l foraminal narrowing at C6/7.\par \par MRI thoracic spine and lumbar spine w/w/o 1/12/2021:- focus of abnormal signal abnormality within cord at T10, does not enhance. \par  [de-identified] : CT head 1/12/2021:- Volume loss and microvascular ischemic changes, age indeterminate lacunar infarcts, no acute hemorrhage.

## 2022-03-18 NOTE — PHYSICAL EXAM
[FreeTextEntry1] : PHYSICAL EXAM\par Constitutional: Alert, no acute distress \par Neck: Full range of motion\par Psychiatric: appropriate affect and mood\par Pulmonary: No respiratory distress, stable on room air\par \par NEUROLOGICAL EXAM\par Mental status: The patient is alert, attentive, and conversational memory intact\par Speech/language: No dysarthria\par Cranial nerves:\par CN II: Pupil size equal and briskly reactive to light. \par CN III, IV, VI: EOMI, no nystagmus, no ptosis\par CN V: Facial sensation is intact to pinprick in all 3 divisions bilaterally.\par CN VII: Face is symmetric with normal eye closure and smile.\par CN VII: Hearing is normal to rubbing fingers\par CN IX, X: Palate elevates symmetrically. Phonation is normal.\par CN XI: Head turning and shoulder shrug are intact\par CN XII: Tongue is midline with normal movements and no atrophy.\par Motor: There is no pronator drift of out-stretched arms. Tone slightly increased in LE. 5/5 in all muscle groups except 4+/5 right hip flexion. \par Reflexes: 2+ Biceps, Patellar hyperreflexic (R>L), 2+ ankles, no friedman or clonus. Plantar responses are flexor on left and up going on right.\par Sensory: Reduced sensation to light touch, vibration, temperature and pinprick over right leg compared to left. Vibration 20 seconds right toe and 30 seconds left toe. \par Coordination: Rapid alternating movements and fine finger movements are intact. There is no dysmetria on finger-to-nose. There are no abnormal or extraneous movements. \par Gait/Stance: Walks with a cane, but can also walk without cane. Able to stand up without assistance. slow cautious gait, short steps and slow turn\par \par \par EDSS 6/16/2021:- 5.5

## 2022-03-22 ENCOUNTER — APPOINTMENT (OUTPATIENT)
Dept: NEUROLOGY | Facility: CLINIC | Age: 67
End: 2022-03-22

## 2022-10-06 ENCOUNTER — APPOINTMENT (OUTPATIENT)
Dept: NEUROLOGY | Facility: CLINIC | Age: 67
End: 2022-10-06

## 2023-05-30 NOTE — PHYSICAL THERAPY INITIAL EVALUATION ADULT - DISCHARGE PLANNER MADE AWARE
Identified pt with two pt identifiers(name and ). Reviewed record in preparation for visit and have obtained necessary documentation. All patient medications has been reviewed. Chief Complaint   Patient presents with    Follow-up     Follow up abd  pain       Health Maintenance Due   Topic    COVID-19 Vaccine (1)    Varicella vaccine (1 of 2 - 2-dose childhood series)    Hepatitis C screen     Cervical cancer screen     Depression Screen        Vitals:    23 1356   BP: 103/62   Site: Left Upper Arm   Position: Sitting   Pulse: 95   Resp: 20   Temp: 98.2 °F (36.8 °C)   TempSrc: Oral   SpO2: 99%   Weight: 205 lb (93 kg)   Height: 5' 1\" (1.549 m)         4. Have you been to the ER, urgent care clinic since your last visit? Hospitalized since your last visit? No      5. Have you seen or consulted any other health care providers outside of the [address] [place] [address] since your last visit? Include any pap smears or colon screening. No      Patient is accompanied by self I have received verbal consent from Niru Bull to discuss any/all medical information while they are present in the room. yes

## 2023-08-25 NOTE — PATIENT PROFILE ADULT - STATED REASON FOR ADMISSION
abdominal pain and legs weak I will START or STAY ON the medications listed below when I get home from the hospital:    ibuprofen 200 mg oral tablet  -- 3 tab(s) by mouth every 6 hours  -- Indication: For pain    acetaminophen 500 mg oral tablet  -- orally every 6 hours do not take more than 4000 mg/day  -- Indication: For pain    oxyCODONE 5 mg oral tablet  -- 1 tab(s) by mouth every 6 hours as needed for  severe pain MDD: 4 tabs/day  -- Indication: For Sever pain    magnesium hydroxide 8% oral suspension  -- 30 milliliter(s) by mouth 2 times a day As needed Constipation  -- Indication: For constipation    metFORMIN 500 mg oral tablet  -- 3 tablets daily   -- Indication: For glucose intolerance    NIFEdipine 60 mg oral tablet, extended release  -- 1 tab(s) by mouth once a day  -- Indication: For HTN    lanolin topical ointment  -- 1 Apply on skin to affected area every 6 hours As needed Sore Nipples  -- Indication: For Sore nipples    Vitamin D3  -- Indication: For vitamin

## 2025-02-03 NOTE — OCCUPATIONAL THERAPY INITIAL EVALUATION ADULT - BALANCE TRAINING, PT EVAL
done
GOAL: Pt will demonstrate improved static/dynamic balance to good in order to increase safety and independence in ADLs within 4 weeks